# Patient Record
Sex: MALE | Race: WHITE | NOT HISPANIC OR LATINO | Employment: FULL TIME | URBAN - METROPOLITAN AREA
[De-identification: names, ages, dates, MRNs, and addresses within clinical notes are randomized per-mention and may not be internally consistent; named-entity substitution may affect disease eponyms.]

---

## 2017-02-10 ENCOUNTER — OFFICE VISIT (OUTPATIENT)
Dept: FAMILY MEDICINE | Facility: CLINIC | Age: 62
End: 2017-02-10
Payer: COMMERCIAL

## 2017-02-10 VITALS
DIASTOLIC BLOOD PRESSURE: 86 MMHG | SYSTOLIC BLOOD PRESSURE: 120 MMHG | HEART RATE: 88 BPM | WEIGHT: 243.19 LBS | BODY MASS INDEX: 38.17 KG/M2 | HEIGHT: 67 IN

## 2017-02-10 DIAGNOSIS — I10 ESSENTIAL HYPERTENSION: Primary | ICD-10-CM

## 2017-02-10 DIAGNOSIS — E66.01 SEVERE OBESITY (BMI 35.0-39.9) WITH COMORBIDITY: ICD-10-CM

## 2017-02-10 DIAGNOSIS — E78.5 DYSLIPIDEMIA: ICD-10-CM

## 2017-02-10 DIAGNOSIS — Z23 NEED FOR SHINGLES VACCINE: ICD-10-CM

## 2017-02-10 DIAGNOSIS — K64.9 HEMORRHOIDS, UNSPECIFIED HEMORRHOID TYPE: ICD-10-CM

## 2017-02-10 PROCEDURE — 3074F SYST BP LT 130 MM HG: CPT | Mod: S$GLB,,, | Performed by: FAMILY MEDICINE

## 2017-02-10 PROCEDURE — 3079F DIAST BP 80-89 MM HG: CPT | Mod: S$GLB,,, | Performed by: FAMILY MEDICINE

## 2017-02-10 PROCEDURE — 99999 PR PBB SHADOW E&M-EST. PATIENT-LVL III: CPT | Mod: PBBFAC,,, | Performed by: FAMILY MEDICINE

## 2017-02-10 PROCEDURE — 99214 OFFICE O/P EST MOD 30 MIN: CPT | Mod: S$GLB,,, | Performed by: FAMILY MEDICINE

## 2017-02-10 RX ORDER — HYDROCORTISONE ACETATE, PRAMOXINE HCL 2.5; 1 G/100G; G/100G
CREAM TOPICAL 3 TIMES DAILY
Qty: 57 G | Refills: 0 | Status: SHIPPED | OUTPATIENT
Start: 2017-02-10 | End: 2019-12-26

## 2017-02-10 NOTE — MR AVS SNAPSHOT
[facility]   [place]  [place] [address]  Phone: [phone]  Fax: [phone]                  Darrian Velez   2/10/2017 2:40 PM   Office Visit    Description:  Male : 1955   Provider:  Balwinder Alvarado MD   Department:  [facility]           Reason for Visit     Follow-up     Hypertension           Diagnoses this Visit        Comments    Essential hypertension    -  Primary     Dyslipidemia         Severe obesity (BMI 35.0-39.9) with comorbidity         Need for shingles vaccine         Hemorrhoids, unspecified hemorrhoid type                To Do List           Future Appointments        Provider Department Dept Phone    2017 7:30 AM LAB, [facility] [phone]      Goals (5 Years of Data)     None       These Medications        Disp Refills Start End    zoster vaccine live, PF, (ZOSTAVAX, PF,) 19,400 unit/0.65 mL injection 1 vial 0 2/10/2017 2/10/2017    Inject 19,400 Units into the skin once. - Subcutaneous    Pharmacy: [facility] [address] Ph #: [phone]       pramoxine-hydrocortisone cream 57 g 0 2/10/2017     Apply topically 3 (three) times daily. - Topical (Top)    Pharmacy: [facility] [facility] [address] Ph #: [phone]         [facility]     [facility] -  Assistance  Registered nurses in the [facility] provide clinical advisement, health education, appointment booking, and other advisory services.  Call for this free service at [phone].             Medications           Message regarding Medications     Verify the changes and/or additions to your medication regime listed below are the same as discussed with your clinician today.  If any of these changes or additions are incorrect, please notify your healthcare provider.        START taking these NEW medications        Refills    zoster  "vaccine live, PF, (ZOSTAVAX, PF,) 19,400 unit/0.65 mL injection 0    Sig: Inject 19,400 Units into the skin once.    Class: Print    Route: Subcutaneous    pramoxine-hydrocortisone cream 0    Sig: Apply topically 3 (three) times daily.    Class: Normal    Route: Topical (Top)           Verify that the below list of medications is an accurate representation of the medications you are currently taking.  If none reported, the list may be blank. If incorrect, please contact your healthcare provider. Carry this list with you in case of emergency.           Current Medications     ASPIRIN (ASPIR-81 ORAL)     atorvastatin (LIPITOR) 10 MG tablet Take 1 tablet (10 mg total) by mouth once daily.    fenofibrate micronized (LOFIBRA) 134 MG Cap TAKE ONE CAPSULE BY MOUTH ONCE DAILY IN THE EVENING    losartan-hydrochlorothiazide 50-12.5 mg (HYZAAR) 50-12.5 mg per tablet Take 1 tablet by mouth once daily.    multivitamin (MULTIVITAMIN) per tablet Take 1 tablet by mouth once daily.      OMEGA-3 FATTY ACIDS/FISH OIL (OMEGA 3 FISH OIL ORAL) Take by mouth. 1 Capsule By mouth Twice a day     loratadine (CLARITIN) 10 mg tablet Take 1 tablet (10 mg total) by mouth once daily.    pramoxine-hydrocortisone cream Apply topically 3 (three) times daily.    zoster vaccine live, PF, (ZOSTAVAX, PF,) 19,400 unit/0.65 mL injection Inject 19,400 Units into the skin once.           Clinical Reference Information           Your Vitals Were     BP Pulse Height Weight BMI    120/86 88 5' 7" (1.702 m) 110.3 kg (243 lb 2.7 oz) 38.09 kg/m2      Blood Pressure          Most Recent Value    BP  120/86      Allergies as of 2/10/2017     No Known Allergies      Immunizations Administered on Date of Encounter - 2/10/2017     None      Orders Placed During Today's Visit     Future Labs/Procedures Expected by Expires    CBC auto differential  2/10/2017 2/10/2018    Comprehensive metabolic panel  2/10/2017 5/11/2017    Lipid panel  2/10/2017 5/11/2017    TSH  " 2/10/2017 5/11/2017      Language Assistance Services     ATTENTION: Language assistance services are available, free of charge. Please call [phone].      ATENCIÓN: Si habfrank ward, tiene a atkinson disposición servicios gratuitos de asistencia lingüística. Llame al [phone].     CHÚ Ý: N?u b?n nói Ti?ng Vi?t, có các d?ch v? h? tr? ngôn ng? mi?n phí dành cho b?n. G?i s? [phone].         [facility] complies with applicable Federal civil rights laws and does not discriminate on the basis of race, color, national origin, age, disability, or sex.

## 2017-02-10 NOTE — PROGRESS NOTES
Subjective:       Patient ID: Darrian Velez is a 62 y.o. male.    Chief Complaint: Follow-up and Hypertension    HPI Comments: 62 years old male who came to the clinic for blood pressure check.  Blood pressure today is stable.  No chest pain palpitations orthopnea or PND.  Patient with a BMI of 38 currently trying to lose weight.  Patient with good compliance with medical regimen.    Hypertension   Pertinent negatives include no chest pain or palpitations.     Review of Systems   Constitutional: Negative.    HENT: Negative.    Eyes: Negative.    Respiratory: Negative.    Cardiovascular: Negative.  Negative for chest pain, palpitations and leg swelling.   Gastrointestinal: Negative.    Genitourinary: Negative.    Musculoskeletal: Negative.    Skin: Negative.    Neurological: Negative.    Psychiatric/Behavioral: Negative.        Objective:      Physical Exam   Constitutional: He is oriented to person, place, and time. He appears well-developed and well-nourished. No distress.   HENT:   Head: Normocephalic and atraumatic.   Right Ear: External ear normal.   Left Ear: External ear normal.   Nose: Nose normal.   Mouth/Throat: Oropharynx is clear and moist. No oropharyngeal exudate.   Eyes: Conjunctivae and EOM are normal. Pupils are equal, round, and reactive to light. Right eye exhibits no discharge. Left eye exhibits no discharge. No scleral icterus.   Neck: Normal range of motion. Neck supple. No JVD present. No tracheal deviation present. No thyromegaly present.   Cardiovascular: Normal rate, regular rhythm, normal heart sounds and intact distal pulses.  Exam reveals no gallop and no friction rub.    No murmur heard.  Pulmonary/Chest: Effort normal and breath sounds normal. No stridor. No respiratory distress. He has no wheezes. He has no rales. He exhibits no tenderness.   Abdominal: Soft. Bowel sounds are normal. He exhibits no distension and no mass. There is no tenderness. There is no rebound and no  guarding.   Musculoskeletal: Normal range of motion. He exhibits no edema or tenderness.   Lymphadenopathy:     He has no cervical adenopathy.   Neurological: He is alert and oriented to person, place, and time. He has normal reflexes. He displays normal reflexes. No cranial nerve deficit. He exhibits normal muscle tone. Coordination normal.   Skin: Skin is warm and dry. No rash noted. He is not diaphoretic. No erythema. No pallor.   Psychiatric: He has a normal mood and affect. His behavior is normal. Judgment and thought content normal.   Nursing note and vitals reviewed.      Assessment:       1. Essential hypertension    2. Dyslipidemia    3. Severe obesity (BMI 35.0-39.9) with comorbidity    4. Need for shingles vaccine    5. Hemorrhoids, unspecified hemorrhoid type        Plan:         Darrian Fuentes was seen today for follow-up and hypertension.    Diagnoses and all orders for this visit:    Essential hypertension  -     Comprehensive metabolic panel; Future  -     Lipid panel; Future  -     TSH; Future  -     CBC auto differential; Future    Dyslipidemia  -     Comprehensive metabolic panel; Future  -     Lipid panel; Future  -     TSH; Future    Severe obesity (BMI 35.0-39.9) with comorbidity    Need for shingles vaccine  -     zoster vaccine live, PF, (ZOSTAVAX, PF,) 19,400 unit/0.65 mL injection; Inject 19,400 Units into the skin once.    Hemorrhoids, unspecified hemorrhoid type  -     pramoxine-hydrocortisone cream; Apply topically 3 (three) times daily.

## 2017-02-13 ENCOUNTER — LAB VISIT (OUTPATIENT)
Dept: LAB | Facility: HOSPITAL | Age: 62
End: 2017-02-13
Attending: FAMILY MEDICINE
Payer: COMMERCIAL

## 2017-02-13 DIAGNOSIS — I10 ESSENTIAL HYPERTENSION: ICD-10-CM

## 2017-02-13 DIAGNOSIS — E78.5 DYSLIPIDEMIA: ICD-10-CM

## 2017-02-13 LAB
ALBUMIN SERPL BCP-MCNC: 4 G/DL
ALP SERPL-CCNC: 82 U/L
ALT SERPL W/O P-5'-P-CCNC: 39 U/L
ANION GAP SERPL CALC-SCNC: 10 MMOL/L
AST SERPL-CCNC: 26 U/L
BASOPHILS # BLD AUTO: 0.03 K/UL
BASOPHILS NFR BLD: 0.5 %
BILIRUB SERPL-MCNC: 0.3 MG/DL
BUN SERPL-MCNC: 16 MG/DL
CALCIUM SERPL-MCNC: 9.5 MG/DL
CHLORIDE SERPL-SCNC: 108 MMOL/L
CHOLEST/HDLC SERPL: 3.8 {RATIO}
CO2 SERPL-SCNC: 23 MMOL/L
CREAT SERPL-MCNC: 1 MG/DL
DIFFERENTIAL METHOD: ABNORMAL
EOSINOPHIL # BLD AUTO: 0.1 K/UL
EOSINOPHIL NFR BLD: 2.5 %
ERYTHROCYTE [DISTWIDTH] IN BLOOD BY AUTOMATED COUNT: 13.5 %
EST. GFR  (AFRICAN AMERICAN): >60 ML/MIN/1.73 M^2
EST. GFR  (NON AFRICAN AMERICAN): >60 ML/MIN/1.73 M^2
GLUCOSE SERPL-MCNC: 119 MG/DL
HCT VFR BLD AUTO: 45.9 %
HDL/CHOLESTEROL RATIO: 26 %
HDLC SERPL-MCNC: 123 MG/DL
HDLC SERPL-MCNC: 32 MG/DL
HGB BLD-MCNC: 15.5 G/DL
LDLC SERPL CALC-MCNC: 58.6 MG/DL
LYMPHOCYTES # BLD AUTO: 1.7 K/UL
LYMPHOCYTES NFR BLD: 30.3 %
MCH RBC QN AUTO: 30.8 PG
MCHC RBC AUTO-ENTMCNC: 33.8 %
MCV RBC AUTO: 91 FL
MONOCYTES # BLD AUTO: 0.9 K/UL
MONOCYTES NFR BLD: 16.1 %
NEUTROPHILS # BLD AUTO: 2.9 K/UL
NEUTROPHILS NFR BLD: 50.4 %
NONHDLC SERPL-MCNC: 91 MG/DL
PLATELET # BLD AUTO: 215 K/UL
PMV BLD AUTO: 12.3 FL
POTASSIUM SERPL-SCNC: 4 MMOL/L
PROT SERPL-MCNC: 7.4 G/DL
RBC # BLD AUTO: 5.03 M/UL
SODIUM SERPL-SCNC: 141 MMOL/L
TRIGL SERPL-MCNC: 162 MG/DL
TSH SERPL DL<=0.005 MIU/L-ACNC: 2.23 UIU/ML
WBC # BLD AUTO: 5.71 K/UL

## 2017-02-13 PROCEDURE — 80061 LIPID PANEL: CPT

## 2017-02-13 PROCEDURE — 85025 COMPLETE CBC W/AUTO DIFF WBC: CPT

## 2017-02-13 PROCEDURE — 36415 COLL VENOUS BLD VENIPUNCTURE: CPT | Mod: PO

## 2017-02-13 PROCEDURE — 80053 COMPREHEN METABOLIC PANEL: CPT

## 2017-02-13 PROCEDURE — 84443 ASSAY THYROID STIM HORMONE: CPT

## 2017-02-20 DIAGNOSIS — I10 ESSENTIAL HYPERTENSION: ICD-10-CM

## 2017-02-20 RX ORDER — LOSARTAN POTASSIUM AND HYDROCHLOROTHIAZIDE 12.5; 5 MG/1; MG/1
TABLET ORAL
Qty: 90 TABLET | Refills: 0 | Status: SHIPPED | OUTPATIENT
Start: 2017-02-20 | End: 2017-05-12 | Stop reason: SDUPTHER

## 2017-03-15 DIAGNOSIS — E78.5 DYSLIPIDEMIA: ICD-10-CM

## 2017-03-15 RX ORDER — FENOFIBRATE 134 MG/1
CAPSULE ORAL
Qty: 30 CAPSULE | Refills: 0 | Status: SHIPPED | OUTPATIENT
Start: 2017-03-15 | End: 2017-04-13 | Stop reason: SDUPTHER

## 2017-04-13 DIAGNOSIS — E78.5 DYSLIPIDEMIA: ICD-10-CM

## 2017-04-13 RX ORDER — FENOFIBRATE 134 MG/1
CAPSULE ORAL
Qty: 30 CAPSULE | Refills: 0 | Status: SHIPPED | OUTPATIENT
Start: 2017-04-13 | End: 2017-05-12 | Stop reason: SDUPTHER

## 2017-05-12 DIAGNOSIS — I10 ESSENTIAL HYPERTENSION: ICD-10-CM

## 2017-05-12 DIAGNOSIS — E78.5 DYSLIPIDEMIA: ICD-10-CM

## 2017-05-12 RX ORDER — LOSARTAN POTASSIUM AND HYDROCHLOROTHIAZIDE 12.5; 5 MG/1; MG/1
TABLET ORAL
Qty: 90 TABLET | Refills: 0 | Status: SHIPPED | OUTPATIENT
Start: 2017-05-12 | End: 2017-06-09 | Stop reason: SDUPTHER

## 2017-05-12 RX ORDER — FENOFIBRATE 134 MG/1
CAPSULE ORAL
Qty: 30 CAPSULE | Refills: 0 | Status: SHIPPED | OUTPATIENT
Start: 2017-05-12 | End: 2017-06-09 | Stop reason: SDUPTHER

## 2017-06-09 ENCOUNTER — OFFICE VISIT (OUTPATIENT)
Dept: FAMILY MEDICINE | Facility: CLINIC | Age: 62
End: 2017-06-09
Payer: COMMERCIAL

## 2017-06-09 VITALS
HEART RATE: 78 BPM | SYSTOLIC BLOOD PRESSURE: 124 MMHG | BODY MASS INDEX: 38.92 KG/M2 | WEIGHT: 248 LBS | OXYGEN SATURATION: 98 % | DIASTOLIC BLOOD PRESSURE: 76 MMHG | HEIGHT: 67 IN

## 2017-06-09 DIAGNOSIS — E78.5 DYSLIPIDEMIA: ICD-10-CM
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
## 2017-06-09 DIAGNOSIS — S60.562A INSECT BITE HAND, LEFT, INITIAL ENCOUNTER: Primary | ICD-10-CM

## 2017-06-09 DIAGNOSIS — I10 ESSENTIAL HYPERTENSION: ICD-10-CM

## 2017-06-09 PROCEDURE — 99999 PR PBB SHADOW E&M-EST. PATIENT-LVL III: CPT | Mod: PBBFAC,,, | Performed by: FAMILY MEDICINE

## 2017-06-09 PROCEDURE — 99214 OFFICE O/P EST MOD 30 MIN: CPT | Mod: S$GLB,,, | Performed by: FAMILY MEDICINE

## 2017-06-09 RX ORDER — DOXYCYCLINE 100 MG/1
100 CAPSULE ORAL 2 TIMES DAILY
Qty: 20 CAPSULE | Refills: 0 | Status: SHIPPED | OUTPATIENT
Start: 2017-06-09 | End: 2018-01-22

## 2017-06-09 RX ORDER — ATORVASTATIN CALCIUM 10 MG/1
10 TABLET, FILM COATED ORAL DAILY
Qty: 30 TABLET | Refills: 11 | Status: SHIPPED | OUTPATIENT
Start: 2017-06-09 | End: 2018-05-31 | Stop reason: SDUPTHER

## 2017-06-09 RX ORDER — MUPIROCIN 20 MG/G
OINTMENT TOPICAL 3 TIMES DAILY
Qty: 30 G | Refills: 0 | Status: SHIPPED | OUTPATIENT
Start: 2017-06-09 | End: 2018-01-22

## 2017-06-09 RX ORDER — FENOFIBRATE 134 MG/1
CAPSULE ORAL
Qty: 30 CAPSULE | Refills: 11 | Status: SHIPPED | OUTPATIENT
Start: 2017-06-09 | End: 2018-08-22 | Stop reason: SDUPTHER

## 2017-06-09 RX ORDER — LOSARTAN POTASSIUM AND HYDROCHLOROTHIAZIDE 12.5; 5 MG/1; MG/1
1 TABLET ORAL DAILY
Qty: 30 TABLET | Refills: 11 | Status: SHIPPED | OUTPATIENT
Start: 2017-06-09 | End: 2018-05-31 | Stop reason: SDUPTHER

## 2017-06-09 NOTE — PROGRESS NOTES
Subjective:       Patient ID: Darrian Velez is a 62 y.o. male.    Chief Complaint: Insect Bite    62 years old male who came to the clinic with left hand fifth finger redness associated with insect bite.  Patient with mild limitation to movement.  No fevers or chills.  Patient was using antibiotic ointment with no significant improvement.  Blood pressure today stable.  No chest pain palpitations orthopnea or PND.  Patient with a BMI of 38 currently trying to lose weight.  Patient with good compliance with cholesterol medicine.      Insect Bite   Pertinent negatives include no chest pain.     Review of Systems   Constitutional: Negative.    HENT: Negative.    Eyes: Negative.    Respiratory: Negative.    Cardiovascular: Negative.  Negative for chest pain, palpitations and leg swelling.   Gastrointestinal: Negative.    Genitourinary: Negative.    Musculoskeletal: Negative.    Skin: Negative.    Neurological: Negative.    Psychiatric/Behavioral: Negative.        Objective:      Physical Exam   Constitutional: He is oriented to person, place, and time. He appears well-developed and well-nourished. No distress.   HENT:   Head: Normocephalic and atraumatic.   Right Ear: External ear normal.   Left Ear: External ear normal.   Nose: Nose normal.   Mouth/Throat: Oropharynx is clear and moist. No oropharyngeal exudate.   Eyes: Conjunctivae and EOM are normal. Pupils are equal, round, and reactive to light. Right eye exhibits no discharge. Left eye exhibits no discharge. No scleral icterus.   Neck: Normal range of motion. Neck supple. No JVD present. No tracheal deviation present. No thyromegaly present.   Cardiovascular: Normal rate, regular rhythm, normal heart sounds and intact distal pulses.  Exam reveals no gallop and no friction rub.    No murmur heard.  Pulmonary/Chest: Effort normal and breath sounds normal. No stridor. No respiratory distress. He has no wheezes. He has no rales. He exhibits no tenderness.    Abdominal: Soft. Bowel sounds are normal. He exhibits no distension and no mass. There is no tenderness. There is no rebound and no guarding.   Musculoskeletal: Normal range of motion. He exhibits no edema or tenderness.   Lymphadenopathy:     He has no cervical adenopathy.   Neurological: He is alert and oriented to person, place, and time. He has normal reflexes. He displays normal reflexes. No cranial nerve deficit. He exhibits normal muscle tone. Coordination normal.   Skin: Skin is warm and dry. No rash noted. He is not diaphoretic. There is erythema. No pallor.        Psychiatric: He has a normal mood and affect. His behavior is normal. Judgment and thought content normal.   Nursing note and vitals reviewed.      Assessment:       1. Insect bite hand, left, initial encounter    2. Essential hypertension    3. Dyslipidemia        Plan:         Darrian Fuentes was seen today for insect bite.    Diagnoses and all orders for this visit:    Insect bite hand, left, initial encounter    Essential hypertension  -     losartan-hydrochlorothiazide 50-12.5 mg (HYZAAR) 50-12.5 mg per tablet; Take 1 tablet by mouth once daily.    Dyslipidemia  -     atorvastatin (LIPITOR) 10 MG tablet; Take 1 tablet (10 mg total) by mouth once daily.  -     fenofibrate micronized (LOFIBRA) 134 MG Cap; TAKE ONE CAPSULE BY MOUTH ONCE DAILY IN THE EVENING    Other orders  -     doxycycline (MONODOX) 100 MG capsule; Take 1 capsule (100 mg total) by mouth 2 (two) times daily.  -     mupirocin (BACTROBAN) 2 % ointment; Apply topically 3 (three) times daily.    Continue monitoring blood pressure at home, low sodium diet.   Diet and physical activity to promote weight loss.

## 2017-06-09 NOTE — PATIENT INSTRUCTIONS
"  Insect, Spider, and Scorpion Bites and Stings  Most insect bites are harmless and cause only minor swelling or itching. But if youre allergic to insects such as wasps or bees, a sting can cause a life-threatening allergic reaction. The venom (poison) from scorpions and certain spiders can also be deadly, although this is rare. Knowing when to seek emergency care could save your life.     The black  (top) and brown recluse (bottom) are two poisonous spiders found in the [place].   When to go to the emergency room (ER)  Call 911 right away for any:  · Scorpion sting  · Bite from a black, red, or brown  spider or brown recluse spider  · Signs of an allergic reaction such as:  ¨ Hives  ¨ Swelling of your eyes, lips, or the inside of your throat  ¨ Trouble breathing  ¨ Dizziness or confusion  What to expect in the ER  · If youre having trouble breathing, youll be given oxygen through a mask. In case of severe breathing difficulty, you may have a tube inserted in your throat and be placed on a ventilator (breathing machine).  · If you are having a severe allergic reaction from a sting (called anaphylaxis), you may be given a shot of epinephrine. If it is known that you are allergic to bee or wasp stings, your doctor may give you a prescription for an "epi-pen" that you can keep with you at all times in case of a sting.  · You may receive antivenin (a substance that reverses the effects of poison) for some spider bites and scorpion stings. Because antivenin can sometimes cause other problems, your doctor will weigh the risks and benefits of this treatment.  · Steroids such as prednisone are often used to treat allergic reactions. In many cases, your doctor will prescribe an antihistamine to help relieve itching.  Easing symptoms of an insect bite or sting  · Try to remove a stinger you can see. Use your fingernail, a knife edge, or credit card to scrape against the skin. Do not squeeze or " pull.  · Apply ice or a cold compress to reduce pain and swelling (keep a thin cloth between the cold source and the skin).   Date Last Reviewed: 11/20/2014  © 1514-6721 [employer]. [address] [place] [address], [place] [address]. All rights reserved. This information is not intended as a substitute for professional medical care. Always follow your healthcare professional's instructions.

## 2018-01-22 ENCOUNTER — TELEPHONE (OUTPATIENT)
Dept: FAMILY MEDICINE | Facility: CLINIC | Age: 63
End: 2018-01-22

## 2018-01-22 ENCOUNTER — HOSPITAL ENCOUNTER (OUTPATIENT)
Dept: RADIOLOGY | Facility: HOSPITAL | Age: 63
Discharge: HOME | End: 2018-01-22
Attending: FAMILY MEDICINE
Payer: COMMERCIAL

## 2018-01-22 ENCOUNTER — OFFICE VISIT (OUTPATIENT)
Dept: FAMILY MEDICINE | Facility: CLINIC | Age: 63
End: 2018-01-22
Payer: COMMERCIAL

## 2018-01-22 VITALS
BODY MASS INDEX: 38.06 KG/M2 | WEIGHT: 242.5 LBS | OXYGEN SATURATION: 96 % | HEIGHT: 67 IN | HEART RATE: 96 BPM | DIASTOLIC BLOOD PRESSURE: 68 MMHG | TEMPERATURE: 99 F | SYSTOLIC BLOOD PRESSURE: 140 MMHG

## 2018-01-22 DIAGNOSIS — M25.572 ACUTE LEFT ANKLE PAIN: ICD-10-CM

## 2018-01-22 DIAGNOSIS — M25.572 ACUTE LEFT ANKLE PAIN: Primary | ICD-10-CM

## 2018-01-22 PROCEDURE — 73630 X-RAY EXAM OF FOOT: CPT | Mod: TC,PO,LT

## 2018-01-22 PROCEDURE — 99999 PR PBB SHADOW E&M-EST. PATIENT-LVL IV: CPT | Mod: PBBFAC,,, | Performed by: FAMILY MEDICINE

## 2018-01-22 PROCEDURE — 73610 X-RAY EXAM OF ANKLE: CPT | Mod: 26,LT,, | Performed by: RADIOLOGY

## 2018-01-22 PROCEDURE — 73630 X-RAY EXAM OF FOOT: CPT | Mod: 26,LT,, | Performed by: RADIOLOGY

## 2018-01-22 PROCEDURE — 73610 X-RAY EXAM OF ANKLE: CPT | Mod: TC,PO,LT

## 2018-01-22 PROCEDURE — 99214 OFFICE O/P EST MOD 30 MIN: CPT | Mod: S$GLB,,, | Performed by: FAMILY MEDICINE

## 2018-01-22 RX ORDER — IBUPROFEN 600 MG/1
600 TABLET ORAL
Qty: 42 TABLET | Refills: 0 | Status: SHIPPED | OUTPATIENT
Start: 2018-01-22 | End: 2018-02-05

## 2018-01-22 NOTE — TELEPHONE ENCOUNTER
Called patient and reviewed Xr results. No fracture. PT referral given. Pain control and R.I.C.E advised.

## 2018-01-22 NOTE — PROGRESS NOTES
Subjective:       Patient ID: Darrian Velez is a 63 y.o. male.    Chief Complaint: Ankle Pain (Left ankle swollen)    63 year old male presents today with left ankle pain. This started when he fell on the ice on Wednesday. This pain is slowly worsening. The pain is worse with walking but he also feels it when he is lying down at night. He has been taking aleve BID. He has never had an injury to this ankle before. He denies any LOC or hitting his head.       Ankle Pain    The incident occurred 5 to 7 days ago. The incident occurred at home. The injury mechanism was a fall. The pain is present in the left ankle. The quality of the pain is described as aching and burning. The pain is at a severity of 8/10. The pain is mild. The pain has been worsening since onset. Associated symptoms include an inability to bear weight, a loss of motion, numbness and tingling. Pertinent negatives include no loss of sensation or muscle weakness. It is unknown if a foreign body is present. The symptoms are aggravated by weight bearing and movement. He has tried NSAIDs for the symptoms. The treatment provided no relief.     Review of Systems   Respiratory: Negative for cough and choking.    Cardiovascular: Negative for leg swelling.   Gastrointestinal: Negative for constipation, diarrhea, nausea and vomiting.   Musculoskeletal: Positive for gait problem and joint swelling.   Skin: Negative for rash.   Neurological: Positive for tingling and numbness.       Objective:     Vitals:    01/22/18 1404   BP: (!) 140/68   Pulse: 96   Temp: 98.5 °F (36.9 °C)        Physical Exam   Constitutional: He is oriented to person, place, and time. He appears well-developed and well-nourished.   HENT:   Head: Normocephalic and atraumatic.   Eyes: Conjunctivae are normal.   Cardiovascular: Normal rate and regular rhythm.    Pulmonary/Chest: Effort normal and breath sounds normal.   Abdominal: Soft.   Musculoskeletal: He exhibits edema and tenderness.         Right ankle: He exhibits decreased range of motion, swelling and ecchymosis. He exhibits normal pulse. Tenderness. Medial malleolus and head of 5th metatarsal tenderness found. No lateral malleolus tenderness found. Achilles tendon exhibits no pain, no defect and normal Randall's test results.        Feet:    Left ankle with visible swelling at lateral malleolus, and TTP of medial malleolus and fith metatarsal. Pulses intact. Capillary refill is normal. There is tenderness with ROM, worst with dorsiflexion against resistance. No erythema, no discharge. Small amount of brusing noted at left medial malleolus  Right ankle normal.    Neurological: He is alert and oriented to person, place, and time. No cranial nerve deficit or sensory deficit. He exhibits normal muscle tone.   Finger to nose intact  Heel to shin intact  Strength and sensation grossly intract BL UE/LE  CN 2-12 grossly intact  PERRLA  Rapid alternating movement intact   Skin: Skin is warm. Capillary refill takes less than 2 seconds.   Psychiatric: He has a normal mood and affect. His behavior is normal. Judgment and thought content normal.   Nursing note and vitals reviewed.      Assessment:       1. Acute left ankle pain        Plan:         Acute left ankle pain  Patient with left ankle pain s/p fail  Fracture vs sprain  XR to rule out fracture  Will give further recommendations once imaging is back  Can consider PT if no fracture  R.I.C.E  Handout given  Ibuprofen TID WM  -     X-Ray Foot Complete 3 view Left; Future; Expected date: 01/22/2018  -     X-Ray Ankle Complete Left; Future; Expected date: 01/22/2018  -     ibuprofen (ADVIL,MOTRIN) 600 MG tablet; Take 1 tablet (600 mg total) by mouth 3 (three) times daily with meals.  Dispense: 42 tablet; Refill: 0            Warning signs discussed, patient to call with any further issues or worsening of symptoms.

## 2018-01-22 NOTE — PATIENT INSTRUCTIONS

## 2018-02-07 ENCOUNTER — CLINICAL SUPPORT (OUTPATIENT)
Dept: REHABILITATION | Facility: HOSPITAL | Age: 63
End: 2018-02-07
Payer: COMMERCIAL

## 2018-02-07 DIAGNOSIS — R29.898 DECREASED STRENGTH OF LOWER EXTREMITY: ICD-10-CM

## 2018-02-07 DIAGNOSIS — M25.572 LEFT ANKLE PAIN, UNSPECIFIED CHRONICITY: ICD-10-CM

## 2018-02-07 DIAGNOSIS — R26.9 IMPAIRED GAIT: ICD-10-CM

## 2018-02-07 PROCEDURE — 97161 PT EVAL LOW COMPLEX 20 MIN: CPT | Mod: PN

## 2018-02-07 PROCEDURE — 97110 THERAPEUTIC EXERCISES: CPT | Mod: PN

## 2018-02-07 NOTE — PLAN OF CARE
TIME RECORD    Date: 2/7/2018    Start Time:  7:09 am   Stop Time:  7:46 am     PROCEDURES:    TIMED  Procedure Min.   TE 10'                      UNTIMED  Procedure Min.   IE 27'          Total Timed Minutes:  10'   Total Timed Units:  1  Total Untimed Units:  1  Charges Billed/# of units:  2 ( 1 IE, 1 TE)     OUTPATIENT PHYSICAL THERAPY   PATIENT EVALUATION  Onset Date: approximately 3 weeks ago  Primary Diagnosis: (L) ankle pain, decreased (L) ankle strength/stability, antalgic gait  Treatment Diagnosis:   1. Left ankle pain, unspecified chronicity     2. Impaired gait     3. Decreased strength of lower extremity       Past Medical History:   Diagnosis Date    ALLERGIC RHINITIS     Hx of colonic polyps 4/05/2012    Tubular adenoma    Hyperlipidemia     Hypertension     MRSA infection     left medial thigh abscess & cellulitis    Personal history of kidney stones      Precautions: HTN, standard  Prior Therapy: no  Medications: Darrian Velez has a current medication list which includes the following prescription(s): aspirin, atorvastatin, fenofibrate micronized, loratadine, losartan-hydrochlorothiazide 50-12.5 mg, multivitamin, omega-3 fatty acids/fish oil, and pramoxine-hydrocortisone.  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   Place of Residence (Steps/Adaptations): Pt stated that he has one step to enter his [facility].   Functional Deficits Leading to Referral/Nature of Injury: difficulty/increased pain in (L) ankle when ambulating and standing  Patient Therapy Goals: decrease pain    Subjective     Darrian Velez states that he fell three weeks ago when he slipped on ice. Pt stated that he twisted his (L) ankle. Pt reported that he underwent xray on (L) ankle and (L) foot and was told did not have any fractures. Pt stated  that his (L) ankle is feeling better, but does still randomly give out when walking. Pt stated that he wears a (L) ankle brace. Pt denies any (L) ankle problems prior to fall. Pt stated that he has increased pain/difficulty walking and standing.     Pain:  Location: (L) ankle   Description: Aching and Sharp  Activities Which Increase Pain: Walking and standing  Activities Which Decrease Pain: ice, elevation and Ibuprofen  Pain Scale: 2/10 at best 2/10 now  10/10 at worst    Objective       Palpation: pt c/o tenderness to palpation along (L) anterior ankle       Range of Motion/Strength:   Hip Right  Left  Pain/Dysfunction with Movement    AROM MMT AROM MMT    Flexion WFL 5/5 WFL 5/5    Extension NT NT NT NT Able to perform good bridge against gravity    Abduction WFL 5/5 WFL 5/5      Knee Right  Left  Pain/Dysfunction with Movement    AROM MMT AROM MMT    Flexion WFL 5/5 WFL 5/5    Extension WFL 5/5 WFL 5/5      Ankle Right   Left  Pain/Dysfunction with Movement    AROM MMT AROM PROM MMT ! = pain   Plantarflexion 47  46! NT 5/5 C/o pain in (L) achilles tendon region   Dorsiflexion -3  3! NT 4+/5! C/o pain in (L) anterior ankle   Inversion 35  35! NT 4+/5! C/o pain in (L)  anterior ankle   Eversion 10  15! NT 4+/5! C/o pain in (L) anterior ankle       Flexibility: decreased (B) gastroc flexibility  Gait: Without AD  Analysis: pt demo antalgic gait with (L) limp, decreased stance time on (L) LE, decreased migdalia  Bed Mobility:Independent  Transfers: Independent  Special Tests: SLS (L) = 3 seconds, increased ankle strategy noted, c/o pain in (L) anterior ankle; (R) = 14 seconds, increased ankle strategy noted    Functional Limitation Reports: G codes  Tool: FOTO ankle SURVEY  Limitation: 42%      TREATMENT     Time In: 7:36 am   Time Out: 7:46 am    PT Evaluation Completed? Yes  Discussed Plan of Care with patient: Yes    Darrian Fuentes received 10 minutes of therapeutic exercise & instruction including:    Date 2/7/18  "  Visit 1/12   FOTO 1/5   bike    MT     gastroc stretch 3x30" seated   Ankle tband   4 directions YTB DF/PF/IV 2x10    EV held   BAPS (seated) -   Towel crunch -   Towel inv/ev -   Ankle AROM -   //bars -   Heel raise -   BAPS -   SLS -   Seen by CK         Darrianindy Fuentes received 0 minutes of manual therapy including:      Written Home Exercises Provided: yes - exercises noted above (See media section)  Darrian Fuentes demo good understanding of the education provided. Patient demo good return demo of skill of exercises. Pt was instructed to stop performing particular therex if he experiences increased pain when performing particular therex. Pt verbalized understanding.       Assessment       Initial Assessment (Pertinent finding, problem list and factors affecting outcome): Mr. Velez is a 63 year old male with c/o (L) ankle pain since fall approximately 3 weeks ago. Pt presents with painful (L) ankle AROM, decreased (L) ankle strength, impaired gait/balance, and decreased functional mobility. Pt will benefit from skilled PT to address the limitations listed above in order to return pt to his highest level of function with decreased pain and limitation.     History  Co-morbidities and personal factors that may impact the plan of care Examination  Body Structures and Functions, activity limitations and participation restrictions that may impact the plan of care    Clinical Presentation   Co-morbidities:   HTN        Personal Factors:   no deficits Body Regions:   lower extremities    Body Systems:    ROM  strength  gait            Participation Restrictions:   n/a     Activity limitations:   Learning and applying knowledge  no deficits    General Tasks and Commands  no deficits    Communication  no deficits    Mobility  walking    Self care  no deficits    Domestic Life  no deficits    Interactions/Relationships  no deficits    Life Areas  no deficits    Community and Social Life  no deficits         stable and " uncomplicated                      low   moderate  moderate Decision Making/ Complexity Score:  low       Rehab Potiential: good  Barriers to Rehab: none  Short Term Goals = Long Term Goals (8 Weeks): 4/7/18  1. Pt will be independent with HEP  2. Pt will improve (L) ankle strength to 5/5 in order to improve functional mobility  3. Pt will perform (L) ankle AROM WNL in all directions with c/o 0/10 pain in order to improve functional mobility  4. Pt will improve FOTO ankle survey score to </= 25% limited   5. Pt will report ability to ambulate community distances with 0/10 pain in (L) ankle    Plan     Certification Period: 2/7/18 to 4/7/18  Recommended Treatment Plan: 2 times per week for 8 weeks: Electrical Stimulation IFC, Gait Training, Group Therapy, Locomotor Training, Manual Therapy, Moist Heat/ Ice, Neuromuscular Re-ed, Patient Education, Self Care, Therapeutic Activites and Therapeutic Exercise  Other Recommendations: modalities prn, ASTYM prn, kinesiotape prn, Functional Dry Needling prn       Therapist: Amy Cabrera, PT    I CERTIFY THE NEED FOR THESE SERVICES FURNISHED UNDER THIS PLAN OF TREATMENT AND WHILE UNDER MY CARE    Physician's comments: ________________________________________________________________________________________________________________________________________________      Physician's Name: ___________________________________

## 2018-02-15 ENCOUNTER — CLINICAL SUPPORT (OUTPATIENT)
Dept: REHABILITATION | Facility: HOSPITAL | Age: 63
End: 2018-02-15
Payer: COMMERCIAL

## 2018-02-15 DIAGNOSIS — R26.9 IMPAIRED GAIT: ICD-10-CM

## 2018-02-15 DIAGNOSIS — M25.572 LEFT ANKLE PAIN, UNSPECIFIED CHRONICITY: ICD-10-CM

## 2018-02-15 DIAGNOSIS — R29.898 DECREASED STRENGTH OF LOWER EXTREMITY: ICD-10-CM

## 2018-02-15 PROCEDURE — 97110 THERAPEUTIC EXERCISES: CPT | Mod: PN

## 2018-02-15 PROCEDURE — 97140 MANUAL THERAPY 1/> REGIONS: CPT | Mod: PN

## 2018-02-15 NOTE — PROGRESS NOTES
"TIME RECORD    Date: 2/15/2018      Start Time:  1706  Stop Time:  1748    PROCEDURES:    TIMED  Procedure Min.   MT 8   TE 34                 UNTIMED  Procedure Min.             Total Timed Minutes:  42  Total Timed Units:  3  Total Untimed Units:    Charges Billed/# of units:  4 (MT-1, TE-3)      Progress/Current Status    Subjective:     Patient ID: Darrian Velez is a 63 y.o. male.  Diagnosis:   1. Left ankle pain, unspecified chronicity     2. Impaired gait     3. Decreased strength of lower extremity       Pain: 0 /10 L ankle  Pt states his L ankle pain has improved since performing exercises at home    Objective:     Pt initiated treatment with TE x 34' f/b MT x 8' consisting of STM/MFR to L plantar fascia, fibularis tendons    Date 2/15/18 2/7/18   Visit 2/12 1/12   POC exp  4/7/18     FOTO 2/5 1/5   bike      MT       gastroc stretch 3x30" seated 3x30" seated   Soleus stretch 3x30" seated    Ankle tband   4 directions YTB DF/PF/IV 2x10     EV held YTB DF/PF/IV 2x10     EV held   BAPS (seated) 2x10 6-way -   Towel crunch 2x1' -   Towel inv/ev Inv 2x1' -   Dowel roll 2x1'    Ankle AROM  -   Heel/toe raise 2x10 ea         //bars  -   Heel raise  -   BAPS  -   SLS  -        Seen by KV CK         Assessment:     Pt tolerated treatment well with no c/o of pain or discomfort. Required verbal and tactile cuing for correc performance of BAPS.    Patient Education/Response:     Cont HEP.    Plans and Goals:     Cont POC. Progress as able.    Short Term Goals = Long Term Goals (8 Weeks): 4/7/18  1. Pt will be independent with HEP  2. Pt will improve (L) ankle strength to 5/5 in order to improve functional mobility  3. Pt will perform (L) ankle AROM WNL in all directions with c/o 0/10 pain in order to improve functional mobility  4. Pt will improve FOTO ankle survey score to </= 25% limited   5. Pt will report ability to ambulate community distances with 0/10 pain in (L) ankle      "

## 2018-02-20 ENCOUNTER — CLINICAL SUPPORT (OUTPATIENT)
Dept: REHABILITATION | Facility: HOSPITAL | Age: 63
End: 2018-02-20
Payer: COMMERCIAL

## 2018-02-20 DIAGNOSIS — R29.898 DECREASED STRENGTH OF LOWER EXTREMITY: ICD-10-CM

## 2018-02-20 DIAGNOSIS — M25.572 LEFT ANKLE PAIN, UNSPECIFIED CHRONICITY: ICD-10-CM

## 2018-02-20 DIAGNOSIS — R26.9 IMPAIRED GAIT: ICD-10-CM

## 2018-02-20 PROCEDURE — 97140 MANUAL THERAPY 1/> REGIONS: CPT | Mod: PN

## 2018-02-20 PROCEDURE — 97110 THERAPEUTIC EXERCISES: CPT | Mod: PN

## 2018-02-20 NOTE — PROGRESS NOTES
"TIME RECORD    Date: 2/20/2018      Start Time:  400  Stop Time:  450    PROCEDURES:    TIMED  Procedure Min.   Therex 35   Manual therapy 10                 UNTIMED  Procedure Min.   Bike 5         Total Timed Minutes:  45  Total Timed Units:  3  Total Untimed Units:  0  Charges Billed/# of units:  3   MTx1, TEx2      Progress/Current Status    Subjective:     Patient ID: Darrian Velez is a 63 y.o. male.  Diagnosis:   1. Left ankle pain, unspecified chronicity     2. Impaired gait     3. Decreased strength of lower extremity       "tight"    Objective:     Pt initiated treatment with supervised bike for active warm up and to promote joint nutrition.  MT x 10' consisting of STM/MFR to L plantar fascia, fibularis tendons, Manual calf stretching and PROM all directions..  Therex performed as per log with 1:1 by PTA x 35 minutes.    Date 2/20/18 2/15/18 2/7/18   Visit 3/12 2/12 1/12   POC exp  4/7/18      FOTO 3/5 2/5 1/5   bike 5'      MT  10'      gastroc stretch 30"x3 seated 3x30" seated 3x30" seated   Soleus stretch 30"x3 seated 3x30" seated    Ankle tband   4 directions YTB DF/PF/IV 2x12    EV AROM 2x12 YTB DF/PF/IV 2x10     EV held YTB DF/PF/IV 2x10     EV held   BAPS (seated) 2x12 L 6 way 2x10 6-way -   Towel crunch 1'x2 2x1' -   Towel inv/ev Inv 2x1' Ever 1' Inv 2x1' -   Dowel roll 2' 2x1'    Ankle AROM   -   Heel/toe raise  stand 2x10 ea          //bars   -   Heel/toe raise 2x10 ea DL  -   BAPS   -   SLS B 30"x2 ea   1 UE support  -         Seen by  1/6 KV CK       Assessment:     Patient able to increase activity with added reps and AROM eversion today without complaint of pain or difficulty.    Patient Education/Response:     Patient educated to continue HEP.  Verbalized understanding.    Plans and Goals:     Cont POC. Progress as able.    Short Term Goals = Long Term Goals (8 Weeks): 4/7/18  1. Pt will be independent with HEP  2. Pt will improve (L) ankle strength to 5/5 in order to improve " functional mobility  3. Pt will perform (L) ankle AROM WNL in all directions with c/o 0/10 pain in order to improve functional mobility  4. Pt will improve FOTO ankle survey score to </= 25% limited   5. Pt will report ability to ambulate community distances with 0/10 pain in (L) ankle

## 2018-02-22 ENCOUNTER — CLINICAL SUPPORT (OUTPATIENT)
Dept: REHABILITATION | Facility: HOSPITAL | Age: 63
End: 2018-02-22
Payer: COMMERCIAL

## 2018-02-22 DIAGNOSIS — R26.9 IMPAIRED GAIT: ICD-10-CM

## 2018-02-22 DIAGNOSIS — R29.898 DECREASED STRENGTH OF LOWER EXTREMITY: ICD-10-CM

## 2018-02-22 DIAGNOSIS — M25.572 LEFT ANKLE PAIN, UNSPECIFIED CHRONICITY: ICD-10-CM

## 2018-02-22 PROCEDURE — 97110 THERAPEUTIC EXERCISES: CPT | Mod: PN

## 2018-02-22 PROCEDURE — 97140 MANUAL THERAPY 1/> REGIONS: CPT | Mod: PN

## 2018-02-27 ENCOUNTER — DOCUMENTATION ONLY (OUTPATIENT)
Dept: REHABILITATION | Facility: HOSPITAL | Age: 63
End: 2018-02-27

## 2018-02-27 ENCOUNTER — CLINICAL SUPPORT (OUTPATIENT)
Dept: REHABILITATION | Facility: HOSPITAL | Age: 63
End: 2018-02-27
Payer: COMMERCIAL

## 2018-02-27 DIAGNOSIS — R26.9 IMPAIRED GAIT: ICD-10-CM

## 2018-02-27 DIAGNOSIS — M25.572 LEFT ANKLE PAIN, UNSPECIFIED CHRONICITY: ICD-10-CM

## 2018-02-27 DIAGNOSIS — R29.898 DECREASED STRENGTH OF LOWER EXTREMITY: ICD-10-CM

## 2018-02-27 PROCEDURE — 97140 MANUAL THERAPY 1/> REGIONS: CPT | Mod: PN

## 2018-02-27 PROCEDURE — 97110 THERAPEUTIC EXERCISES: CPT | Mod: PN

## 2018-02-27 NOTE — PROGRESS NOTES
"TIME RECORD    Date: 2/27/2018      Start Time:  5:00 pm   Stop Time:  5:44 pm     PROCEDURES:    TIMED  Procedure Min.   TE supervised 5' NC   TE 29'    MT 10'              UNTIMED  Procedure Min.             Total Timed Minutes:  39'   Total Timed Units:  3  Total Untimed Units:  0  Charges Billed/# of units:  3 ( 2 TE, 1 MT)       Progress/Current Status    Subjective:     Patient ID: Darrian Velez is a 63 y.o. male.  Diagnosis:   1. Left ankle pain, unspecified chronicity     2. Impaired gait     3. Decreased strength of lower extremity       Pain:   Pt stated that his (L) ankle is feeling a lot better. Pt stated that last Thursday he started weaning himself off of ankle brace and (L) ankle has been feeling good. Pt stated that his (L) ankle was just feeling a little achy prior to therapy session.   Objective:     Pt initiated treatment with supervised bike for active warm up and to promote joint nutrition supervised.  MT x 10' consisting of STM/MFR to L plantar fascia, fibularis tendons, (L) gastroc region.  Therex performed as per log 1:1 with PT x 29 minutes.      Date 2/27/18 2/22/18 2/20/18 2/15/18 2/7/18   Visit 5/12 4/12 3/12 2/12 1/12   POC exp  4/7/18        FOTO 5/5 4/5 3/5 2/5 1/5   bike 5'  5' 5'      MT  10'  10' 10'      gastroc stretch  Standing fitter 3x30"  Standing fitter 3x30"      gastroc stretch 3x30" seated  3x30" seated on EOM 30"x3 seated 3x30" seated 3x30" seated   Soleus stretch   30"x3 seated 3x30" seated    Ankle tband   4 directions RTB 2x10 ea YTB DF/PF/IN/EV  2x12 L AROM YTB DF/PF/IV 2x12    EV AROM 2x12 YTB DF/PF/IV 2x10     EV held YTB DF/PF/IV 2x10     EV held   BAPS (seated) - 2x12 L3  6 way 2x12 L 6 way 2x10 6-way -   Towel crunch - 2' x1 1'x2 2x1' -   Towel inv/ev - Inv 2x1' Ev 1' Inv 2x1' Ever 1' Inv 2x1' -   Dowel roll - 2' standing UE sup 2' 2x1'    Ankle AROM     -   Heel/toe raise - 2x10 B  stand 2x10 ea            //bars     -   Heel/toe raise SL 2x10 ea B 2x10 B " "2x10 ea DL  -   BAPS Standing 2x10 ea    -   SLS B 3x30" // B 3x30" B no UE sup B 30"x2 ea   1 UE support  -   rebounder 1x20 fwd B       Step ups next       Step downs next       Tandem walking on blue foam        CP home 10'      Seen by CK MB 2/6 DH 1/6 KV CK       Assessment:     Pt was able to tolerate progression of standing therex today. Pt demo impaired balance and ankle stability on (B) LE (L) > (R). Pt tolerated therapy session without any c/o increased pain. Continue to progress standing therex as tolerated.     Patient Education/Response:     Continue with HEP    Plans and Goals:     Cont POC. Progress as able.    Short Term Goals = Long Term Goals (8 Weeks): 4/7/18  1. Pt will be independent with HEP  2. Pt will improve (L) ankle strength to 5/5 in order to improve functional mobility  3. Pt will perform (L) ankle AROM WNL in all directions with c/o 0/10 pain in order to improve functional mobility  4. Pt will improve FOTO ankle survey score to </= 25% limited   5. Pt will report ability to ambulate community distances with 0/10 pain in (L) ankle    "

## 2018-02-27 NOTE — PROGRESS NOTES
Face to Face PTA Conference performed with Yaritza De Anda PTA, Aislinn Melo PTA, Balta Gamboa PTA regarding patient's current status, overall progress, and plan of care.    Amy Cabrera, PT     Face to face meeting completed with Amy Cabrera PT regarding current status and progress of   Darrian Velez .  Balta Gamboa PTA    Face to face meeting completed with Amy Cabrera, PT regarding current status and progress of   Darrian Velez .  Aislinn Melo, PTA

## 2018-03-01 ENCOUNTER — CLINICAL SUPPORT (OUTPATIENT)
Dept: REHABILITATION | Facility: HOSPITAL | Age: 63
End: 2018-03-01
Payer: COMMERCIAL

## 2018-03-01 DIAGNOSIS — R26.9 IMPAIRED GAIT: ICD-10-CM

## 2018-03-01 DIAGNOSIS — R29.898 DECREASED STRENGTH OF LOWER EXTREMITY: ICD-10-CM

## 2018-03-01 DIAGNOSIS — M25.572 LEFT ANKLE PAIN, UNSPECIFIED CHRONICITY: ICD-10-CM

## 2018-03-01 PROCEDURE — 97110 THERAPEUTIC EXERCISES: CPT | Mod: PN

## 2018-03-01 NOTE — PROGRESS NOTES
"TIME RECORD    Date: 3/1/2018      Start Time:  4:00 pm   Stop Time:  4:45 pm     PROCEDURES:    TIMED  Procedure Min.   TE supervised 5' NC   TE 40    MT               UNTIMED  Procedure Min.             Total Timed Minutes:  45'   Total Timed Units:  3  Total Untimed Units:  0  Charges Billed/# of units:  3 ( 3 TE)      Progress/Current Status    Subjective:     Patient ID: Darrian Velez is a 63 y.o. male.  Diagnosis:   1. Left ankle pain, unspecified chronicity     2. Impaired gait     3. Decreased strength of lower extremity       Pain: 0/10   Pt stated that his (L) ankle feels fine, no pain since last week. Has not worn brace since last Friday.  Objective:     Pt initiated treatment with supervised bike for active warm up and to promote joint nutrition supervised.   Therex performed as per log 1:1 with PTA x 40 minutes.      Date 3/1/18 2/27/18 2/22/18 2/20/18 2/15/18 2/7/18   Visit 6/12 5/12 4/12 3/12 2/12 1/12   POC exp  4/7/18         FOTO DONE 5/5 4/5 3/5 2/5 1/5   bike 5' 5'  5' 5'      MT  -- 10'  10' 10'      gastroc stretch  3x30" fitter Standing fitter 3x30"  Standing fitter 3x30"      gastroc stretch  3x30" seated  3x30" seated on EOM 30"x3 seated 3x30" seated 3x30" seated   Soleus stretch    30"x3 seated 3x30" seated    Ankle tband   4 directions GTB 2x10 ea. RTB 2x10 ea YTB DF/PF/IN/EV  2x12 L AROM YTB DF/PF/IV 2x12    EV AROM 2x12 YTB DF/PF/IV 2x10     EV held YTB DF/PF/IV 2x10     EV held   BAPS (seated)  - 2x12 L3  6 way 2x12 L 6 way 2x10 6-way -   Towel crunch  - 2' x1 1'x2 2x1' -   Towel inv/ev  - Inv 2x1' Ev 1' Inv 2x1' Ever 1' Inv 2x1' -   Dowel roll  - 2' standing UE sup 2' 2x1'    Ankle AROM      -   Heel/toe raise  - 2x10 B  stand 2x10 ea             //bars      -   Heel/toe raise SL 2x10 ea B SL 2x10 ea B 2x10 B 2x10 ea DL  -   BAPS 6 way 20 each Standing 2x10 ea    -   SLS B 1'x2 //Bars B 3x30" // B 3x30" B no UE sup B 30"x2 ea   1 UE support  -   Steamboats 3 Way abd/ext/flex 2x10 " on Gr foam w GTT                          rebounder 1x20 Fwd B 1x20 fwd B       Step ups 2x10 no UE sup next       Step downs 2x10  w/2 UE support next       Tandem walking on blue foam 2 pads 4 laps //  occ UE sup        CP Home home 10'      Seen by MB 1/6 CK MB 2/6 DH 1/6 KV CK       Assessment:     Pt was able to tolerate progression of standing there ex again this visit. Pt demo impaired balance and ankle stability on (B) LE (L) > (R) so both included. Pt tolerated therapy session pain free throughout visit. MT deferred secondary to no pain. Continue to progress standing therex as tolerated.     Patient Education/Response:     Continue with HEP    Plans and Goals:     Cont POC. Progress as able.    Short Term Goals = Long Term Goals (8 Weeks): 4/7/18  1. Pt will be independent with HEP  2. Pt will improve (L) ankle strength to 5/5 in order to improve functional mobility  3. Pt will perform (L) ankle AROM WNL in all directions with c/o 0/10 pain in order to improve functional mobility  4. Pt will improve FOTO ankle survey score to </= 25% limited   5. Pt will report ability to ambulate community distances with 0/10 pain in (L) ankle

## 2018-03-06 ENCOUNTER — CLINICAL SUPPORT (OUTPATIENT)
Dept: REHABILITATION | Facility: HOSPITAL | Age: 63
End: 2018-03-06
Payer: COMMERCIAL

## 2018-03-06 DIAGNOSIS — R29.898 DECREASED STRENGTH OF LOWER EXTREMITY: ICD-10-CM

## 2018-03-06 DIAGNOSIS — M25.572 LEFT ANKLE PAIN, UNSPECIFIED CHRONICITY: ICD-10-CM

## 2018-03-06 DIAGNOSIS — R26.9 IMPAIRED GAIT: ICD-10-CM

## 2018-03-06 PROCEDURE — 97110 THERAPEUTIC EXERCISES: CPT | Mod: PN

## 2018-03-06 NOTE — PROGRESS NOTES
"TIME RECORD    Date: 3/6/2018      Start Time:  4:02 pm   Stop Time:  4:45 pm     PROCEDURES:    TIMED  Procedure Min.   TE supervised 20' NC   TE 23'                  UNTIMED  Procedure Min.             Total Timed Minutes:  23'   Total Timed Units:  2  Total Untimed Units:  0  Charges Billed/# of units:  2 TE      Progress/Current Status    Subjective:     Patient ID: Darrian Velez is a 63 y.o. male.  Diagnosis:   1. Left ankle pain, unspecified chronicity     2. Impaired gait     3. Decreased strength of lower extremity       Pain: 0 /10  Pt stated that he has not been experiencing pain in (L) ankle.     Objective:     Pt initiated treatment with supervised bike for active warm up and to promote joint nutrition supervised x 5'.   Therex performed as per log 1:1 with PT x 23 minutes and supervised therex x 15'.      Date 3/6/18 3/1/18 2/27/18 2/22/18 2/20/18 2/15/18 2/7/18   Visit 7/12 6/12 5/12 4/12 3/12 2/12 1/12   POC exp  4/7/18          FOTO  DONE 5/5 4/5 3/5 2/5 1/5   bike 5'  5' 5'  5' 5'      MT   -- 10'  10' 10'      gastroc stretch  3x30" fitter 3x30" fitter Standing fitter 3x30"  Standing fitter 3x30"      gastroc stretch   3x30" seated  3x30" seated on EOM 30"x3 seated 3x30" seated 3x30" seated   Soleus stretch     30"x3 seated 3x30" seated    Ankle tband   4 directions GTB 2x15 GTB 2x10 ea. RTB 2x10 ea YTB DF/PF/IN/EV  2x12 L AROM YTB DF/PF/IV 2x12    EV AROM 2x12 YTB DF/PF/IV 2x10     EV held YTB DF/PF/IV 2x10     EV held   BAPS (seated)   - 2x12 L3  6 way 2x12 L 6 way 2x10 6-way -   Towel crunch   - 2' x1 1'x2 2x1' -   Towel inv/ev   - Inv 2x1' Ev 1' Inv 2x1' Ever 1' Inv 2x1' -   Dowel roll   - 2' standing UE sup 2' 2x1'    Ankle AROM       -   Heel/toe raise   - 2x10 B  stand 2x10 ea              //bars       -   Heel/toe raise SL 2x10 ea B SL 2x10 ea B SL 2x10 ea B 2x10 B 2x10 ea DL  -   BAPS 6 way 20 ea 20 each Standing 2x10 ea    -   SLS B 1' x 2 // bars B 1'x2 //Bars B 3x30" // B 3x30" B " "no UE sup B 30"x2 ea   1 UE support  -   Steamboats 3 Way abd/ext/flex 2x10 grn foam GTB B 2x10 on Gr foam w GTT                            rebounder 1x20 fwd B 1x20 Fwd B 1x20 fwd B       Step ups 2x15 blue 2x10 no UE sup next       Step downs 2x10 blue 2x10  w/2 UE support next       Tandem walking on blue foam Fwd/bkwd 5x  2 pads 4 laps //  occ UE sup        CP  Home home 10'      Seen by CK MB 1/6 CK MB 2/6 DH 1/6 KV CK       Assessment:     Pt was able to tolerate all therex noted per log above without any c/o increased pain in (L) ankle. Pt has made steady progress with his PT treatments. Will progress down to 1x/week for 2 weeks and if pt continues to report 0/10 pain in (L) ankle plan to  DC in 2 weeks.     Patient Education/Response:     Continue with HEP    Plans and Goals:     Cont POC. Progress as able. Progress to 1x/week    Short Term Goals = Long Term Goals (8 Weeks): 4/7/18  1. Pt will be independent with HEP  2. Pt will improve (L) ankle strength to 5/5 in order to improve functional mobility  3. Pt will perform (L) ankle AROM WNL in all directions with c/o 0/10 pain in order to improve functional mobility  4. Pt will improve FOTO ankle survey score to </= 25% limited   5. Pt will report ability to ambulate community distances with 0/10 pain in (L) ankle    "

## 2018-03-13 ENCOUNTER — CLINICAL SUPPORT (OUTPATIENT)
Dept: REHABILITATION | Facility: HOSPITAL | Age: 63
End: 2018-03-13
Payer: COMMERCIAL

## 2018-03-13 DIAGNOSIS — M25.572 LEFT ANKLE PAIN, UNSPECIFIED CHRONICITY: ICD-10-CM

## 2018-03-13 DIAGNOSIS — R29.898 DECREASED STRENGTH OF LOWER EXTREMITY: ICD-10-CM

## 2018-03-13 DIAGNOSIS — R26.9 IMPAIRED GAIT: ICD-10-CM

## 2018-03-13 PROCEDURE — 97110 THERAPEUTIC EXERCISES: CPT | Mod: PN

## 2018-03-13 NOTE — PROGRESS NOTES
"TIME RECORD    Date: 3/13/2018      Start Time:  355  Stop Time:  500    PROCEDURES:    TIMED  Procedure Min.   Therex 45       UNTIMED  Procedure Min.   Bike 5   Cold pack 10     Total Timed Minutes:  45  Total Timed Units:  3  Total Untimed Units:  0  Charges Billed/# of units:  3 TE      Progress/Current Status    Subjective:     Patient ID: Darrian Velez is a 63 y.o. male.  Diagnosis:   1. Left ankle pain, unspecified chronicity     2. Impaired gait     3. Decreased strength of lower extremity       "I haven't felt any pain in about a week."    Objective:     Pt initiated treatment with supervised bike for active warm up and to promote joint nutrition supervised x 5'.  Therex performed as per log 1:1 with PTA x 45 minutes. Cold pack x 10 minutes to left ankle in sitting with elevation.    Date 3/13/18 3/6/18 3/1/18 2/27/18 2/22/18 2/20/18 2/15/18 2/7/18   Visit 8/12 7/12 6/12 5/12 4/12 3/12 2/12 1/12   POC exp  4/7/18           FOTO At d/c  DONE 5/5 4/5 3/5 2/5 1/5   bike 5' 5'  5' 5'  5' 5'      MT    -- 10'  10' 10'      gastroc stretch  30"x3 fitter 3x30" fitter 3x30" fitter Standing fitter 3x30"  Standing fitter 3x30"      gastroc stretch above   3x30" seated  3x30" seated on EOM 30"x3 seated 3x30" seated 3x30" seated   Soleus stretch      30"x3 seated 3x30" seated    Ankle tband   4 directions GTB 2x15 GTB 2x15 GTB 2x10 ea. RTB 2x10 ea YTB DF/PF/IN/EV  2x12 L AROM YTB DF/PF/IV 2x12    EV AROM 2x12 YTB DF/PF/IV 2x10     EV held YTB DF/PF/IV 2x10     EV held   BAPS (seated) --   - 2x12 L3  6 way 2x12 L 6 way 2x10 6-way -   Towel crunch --   - 2' x1 1'x2 2x1' -   Towel inv/ev --   - Inv 2x1' Ev 1' Inv 2x1' Ever 1' Inv 2x1' -   Dowel roll --   - 2' standing UE sup 2' 2x1'    Ankle AROM --       -   Heel/toe raise --   - 2x10 B  stand 2x10 ea               //bars        -   Heel/toe raise DL 2x10   SL 2x10 ea B SL 2x10 ea B SL 2x10 ea B SL 2x10 ea B 2x10 B 2x10 ea DL  -   BAPS 6 way L3 2x12 6 way 20 ea 20 " "each Standing 2x10 ea    -   SLS  B 1' x 2 // bars B 1'x2 //Bars B 3x30" // B 3x30" B no UE sup B 30"x2 ea   1 UE support  -   Steamboats 3 Way abd/ext/flex 2x12 B GTB  On grn pad 2x10 grn foam GTB B 2x10 on Gr foam w GTT                              rebounder 2x10 L 3 way 1x20 fwd B 1x20 Fwd B 1x20 fwd B       Step ups 2x15 blue L no UE 2x15 blue 2x10 no UE sup next       Step downs 2x12 blue  No UE 2x10 blue 2x10  w/2 UE support next       Tandem walking on blue foam Fwd/back 2 pads  5 laps ea Fwd/bkwd 5x  2 pads 4 laps //  occ UE sup        CP 10'  Home home 10'      Seen by DH 1/6 CK MB 1/6 CK MB 2/6 DH 1/6 KV CK       Assessment:     Patient able to complete increased reps as noted without complaint of pain.  Performed rebounder with some difficulty maintaining SLS especially with side tossing, but able to complete.    Patient Education/Response:     Patient educated to continue HEP.  Verbalized understanding.    Plans and Goals:     Cont POC. Progress as able. Progress to 1x/week    Short Term Goals = Long Term Goals (8 Weeks): 4/7/18  1. Pt will be independent with HEP  2. Pt will improve (L) ankle strength to 5/5 in order to improve functional mobility  3. Pt will perform (L) ankle AROM WNL in all directions with c/o 0/10 pain in order to improve functional mobility  4. Pt will improve FOTO ankle survey score to </= 25% limited   5. Pt will report ability to ambulate community distances with 0/10 pain in (L) ankle    "

## 2018-03-20 ENCOUNTER — CLINICAL SUPPORT (OUTPATIENT)
Dept: REHABILITATION | Facility: HOSPITAL | Age: 63
End: 2018-03-20
Payer: COMMERCIAL

## 2018-03-20 DIAGNOSIS — R26.9 IMPAIRED GAIT: ICD-10-CM

## 2018-03-20 DIAGNOSIS — R29.898 DECREASED STRENGTH OF LOWER EXTREMITY: ICD-10-CM

## 2018-03-20 DIAGNOSIS — M25.572 LEFT ANKLE PAIN, UNSPECIFIED CHRONICITY: ICD-10-CM

## 2018-03-20 PROCEDURE — 97110 THERAPEUTIC EXERCISES: CPT | Mod: PN

## 2018-03-20 NOTE — PROGRESS NOTES
"DAILY TREATMENT NOTE      Start Time:  4:04 pm   Stop Time:  4:33 pm     PROCEDURES:    TIMED  Procedure Min.   TE 29'                      UNTIMED  Procedure Min.             Total Timed Minutes:  29'  Total Timed Units:  2  Total Untimed Units:  0  Charges Billed/# of units:  2 TE      Progress/Current Status    Subjective:     Patient ID: Darrian Velez is a 63 y.o. male.  Diagnosis:   1. Left ankle pain, unspecified chronicity     2. Impaired gait     3. Decreased strength of lower extremity       Pain: 0 /10  Pt stated that he has not been experiencing pain in (L) ankle. Pt reported that he was able to cut the grass this past weekend without any pain in (L) ankle.   Objective:     Objective measurements were taken and pt participated in therex per log below 1:1 with PT x 29'.    Date 3/20/18 3/13/18 3/6/18 3/1/18 2/27/18 2/22/18 2/20/18 2/15/18 2/7/18   Visit 9/12 8/12 7/12 6/12 5/12 4/12 3/12 2/12 1/12   POC exp  4/7/18            FOTO  At d/c  DONE 5/5 4/5 3/5 2/5 1/5   bike  5' 5'  5' 5'  5' 5'      MT     -- 10'  10' 10'      gastroc stretch  30"x3 fitter  30"x3 fitter 3x30" fitter 3x30" fitter Standing fitter 3x30"  Standing fitter 3x30"      gastroc stretch  above   3x30" seated  3x30" seated on EOM 30"x3 seated 3x30" seated 3x30" seated   Soleus stretch       30"x3 seated 3x30" seated    Ankle tband   4 directions GTB 2x15 GTB 2x15 GTB 2x15 GTB 2x10 ea. RTB 2x10 ea YTB DF/PF/IN/EV  2x12 L AROM YTB DF/PF/IV 2x12    EV AROM 2x12 YTB DF/PF/IV 2x10     EV held YTB DF/PF/IV 2x10     EV held   BAPS (seated)  --   - 2x12 L3  6 way 2x12 L 6 way 2x10 6-way -   Towel crunch  --   - 2' x1 1'x2 2x1' -   Towel inv/ev  --   - Inv 2x1' Ev 1' Inv 2x1' Ever 1' Inv 2x1' -   Dowel roll  --   - 2' standing UE sup 2' 2x1'    Ankle AROM  --       -   Heel/toe raise  --   - 2x10 B  stand 2x10 ea                //bars         -   Heel/toe raise 2x15 DL 2x10   SL 2x10 ea B SL 2x10 ea B SL 2x10 ea B SL 2x10 ea B 2x10 B 2x10 " "ea DL  -   BAPS 6 way  L3 2x12 6 way 20 ea 20 each Standing 2x10 ea    -   SLS   B 1' x 2 // bars B 1'x2 //Bars B 3x30" // B 3x30" B no UE sup B 30"x2 ea   1 UE support  -   Steamboats 3 Way abd/ext/flex GTB 2x15 B  2x12 B GTB  On grn pad 2x10 grn foam GTB B 2x10 on Gr foam w GTT                                rebounder  2x10 L 3 way 1x20 fwd B 1x20 Fwd B 1x20 fwd B       Step ups  2x15 blue L no UE 2x15 blue 2x10 no UE sup next       Step downs  2x12 blue  No UE 2x10 blue 2x10  w/2 UE support next       Tandem walking on blue foam  Fwd/back 2 pads  5 laps ea Fwd/bkwd 5x  2 pads 4 laps //  occ UE sup        CP  10'  Home home 10'      Seen by CK DH 1/6 CK MB 1/6 CK MB 2/6 DH 1/6 KV CK       Assessment:     See DC summary below    Patient Education/Response:     See DC summary below    Plans and Goals:     DC from PT     REHAB SERVICES OUTPATIENT DISCHARGE SUMMARY  Physical Therapy      Name:  Darrian Velez  Date:  3/20/18  Date of Evaluation:  2/7/18  Physician:  Jaspreet Sosa DO  Total # Of Visits:  9  Diagnosis:    1. Left ankle pain, unspecified chronicity     2. Impaired gait     3. Decreased strength of lower extremity         Physical/Functional Status:  At time of discharge, patient had attended 8 PT follow up sessions since initial evaluation on 2/7/18. Pt has made steady progress with his PT treatments as evident by subjective and objective improvements. Pt reports 0/10 pain in (L) ankle, was able to perform (L) ankle AROM in all directions with no reports of pain, improved (L) ankle strength, demo improved SLS balance, and improved FOTO score compared to initial evaluation. Pt was agreeable to being discharged with [facility] at this time. See objective measurements below.     Range of Motion/Strength: 3/20/18    Hip Right   Left   Pain/Dysfunction with Movement     AROM MMT AROM MMT     Flexion WFL 5/5 WFL 5/5     Extension NT NT NT NT Able to perform good bridge against gravity    Abduction WFL 5/5 WFL " 5/5        Knee Right   Left   Pain/Dysfunction with Movement     AROM MMT AROM MMT     Flexion WFL 5/5 WFL 5/5     Extension WFL 5/5 WFL 5/5        Ankle Right     Left   Pain/Dysfunction with Movement     AROM MMT AROM PROM MMT ! = pain   Plantarflexion 47  5/5 48 NT 5/5    Dorsiflexion 3  5/5 3 NT 5/5    Inversion 35  5/5 35 NT 5/5    Eversion 10  5/5 15 NT 5/5      FOTO: 1% limited   30 second SLS test: (B) : 30 seconds w/increased ankle strategy noted      Range of Motion/Strength: initial evaluation    Hip Right   Left   Pain/Dysfunction with Movement     AROM MMT AROM MMT     Flexion WFL 5/5 WFL 5/5     Extension NT NT NT NT Able to perform good bridge against gravity    Abduction WFL 5/5 WFL 5/5        Knee Right   Left   Pain/Dysfunction with Movement     AROM MMT AROM MMT     Flexion WFL 5/5 WFL 5/5     Extension WFL 5/5 WFL 5/5        Ankle Right     Left   Pain/Dysfunction with Movement     AROM MMT AROM PROM MMT ! = pain   Plantarflexion 47   46! NT 5/5 C/o pain in (L) achilles tendon region   Dorsiflexion -3   3! NT 4+/5! C/o pain in (L) anterior ankle   Inversion 35   35! NT 4+/5! C/o pain in (L)  anterior ankle   Eversion 10   15! NT 4+/5! C/o pain in (L) anterior ankle     FOTO: 42% limited    The patient is to be discharged from our Therapy service for the following reason(s):  Patient is now asymptomatic and Patient has reached the maximum rehab potential for the present time    Degree of Goal Achievement:  Patient has met all goals    Short Term Goals = Long Term Goals (8 Weeks): 4/7/18  1. Pt will be independent with HEP - MET  2. Pt will improve (L) ankle strength to 5/5 in order to improve functional mobility  3. Pt will perform (L) ankle AROM WNL in all directions with c/o 0/10 pain in order to improve functional mobility - MET  4. Pt will improve FOTO ankle survey score to </= 25% limited  - MET  5. Pt will report ability to ambulate community distances with 0/10 pain in (L) ankle -  MET    Patient Education:  Pt was educated on and given handout on updated HEP. Pt demo/verbalized understanding. (see media section)     Discharge Plan:  Home Program:  See above. Follow up with MD prn.

## 2018-03-20 NOTE — Clinical Note
Please see PT DC summary for Darrian Velez,  59. Thank you for this referral.   Amy Cabrera, PT 3/20/2018

## 2018-05-31 DIAGNOSIS — E78.5 DYSLIPIDEMIA: ICD-10-CM

## 2018-05-31 DIAGNOSIS — I10 ESSENTIAL HYPERTENSION: ICD-10-CM

## 2018-05-31 RX ORDER — LOSARTAN POTASSIUM AND HYDROCHLOROTHIAZIDE 12.5; 5 MG/1; MG/1
TABLET ORAL
Qty: 30 TABLET | Refills: 11 | Status: SHIPPED | OUTPATIENT
Start: 2018-05-31 | End: 2019-06-17 | Stop reason: SDUPTHER

## 2018-05-31 RX ORDER — ATORVASTATIN CALCIUM 10 MG/1
TABLET, FILM COATED ORAL
Qty: 30 TABLET | Refills: 11 | Status: SHIPPED | OUTPATIENT
Start: 2018-05-31 | End: 2019-06-17 | Stop reason: SDUPTHER

## 2018-08-22 DIAGNOSIS — E78.5 DYSLIPIDEMIA: ICD-10-CM

## 2018-08-22 RX ORDER — FENOFIBRATE 134 MG/1
CAPSULE ORAL
Qty: 30 CAPSULE | Refills: 11 | Status: SHIPPED | OUTPATIENT
Start: 2018-08-22 | End: 2019-10-09 | Stop reason: SDUPTHER

## 2019-01-07 ENCOUNTER — OFFICE VISIT (OUTPATIENT)
Dept: URGENT CARE | Facility: CLINIC | Age: 64
End: 2019-01-07
Payer: COMMERCIAL

## 2019-01-07 ENCOUNTER — TELEPHONE (OUTPATIENT)
Dept: URGENT CARE | Facility: CLINIC | Age: 64
End: 2019-01-07

## 2019-01-07 VITALS
HEIGHT: 67 IN | BODY MASS INDEX: 38.45 KG/M2 | WEIGHT: 245 LBS | OXYGEN SATURATION: 96 % | HEART RATE: 85 BPM | SYSTOLIC BLOOD PRESSURE: 143 MMHG | TEMPERATURE: 98 F | DIASTOLIC BLOOD PRESSURE: 65 MMHG | RESPIRATION RATE: 18 BRPM

## 2019-01-07 DIAGNOSIS — S80.01XA CONTUSION OF RIGHT KNEE, INITIAL ENCOUNTER: ICD-10-CM

## 2019-01-07 DIAGNOSIS — S89.91XA INJURY OF RIGHT KNEE, INITIAL ENCOUNTER: Primary | ICD-10-CM

## 2019-01-07 PROCEDURE — 99214 PR OFFICE/OUTPT VISIT, EST, LEVL IV, 30-39 MIN: ICD-10-PCS | Mod: 25,S$GLB,, | Performed by: NURSE PRACTITIONER

## 2019-01-07 PROCEDURE — 73562 X-RAY EXAM OF KNEE 3: CPT | Mod: FY,RT,S$GLB, | Performed by: RADIOLOGY

## 2019-01-07 PROCEDURE — 96372 THER/PROPH/DIAG INJ SC/IM: CPT | Mod: S$GLB,,, | Performed by: NURSE PRACTITIONER

## 2019-01-07 PROCEDURE — 73562 XR KNEE 3 VIEW RIGHT: ICD-10-PCS | Mod: FY,RT,S$GLB, | Performed by: RADIOLOGY

## 2019-01-07 PROCEDURE — 99214 OFFICE O/P EST MOD 30 MIN: CPT | Mod: 25,S$GLB,, | Performed by: NURSE PRACTITIONER

## 2019-01-07 PROCEDURE — 96372 PR INJECTION,THERAP/PROPH/DIAG2ST, IM OR SUBCUT: ICD-10-PCS | Mod: S$GLB,,, | Performed by: NURSE PRACTITIONER

## 2019-01-07 RX ORDER — DICLOFENAC SODIUM 75 MG/1
75 TABLET, DELAYED RELEASE ORAL 2 TIMES DAILY WITH MEALS
Qty: 60 TABLET | Refills: 0 | Status: SHIPPED | OUTPATIENT
Start: 2019-01-07 | End: 2019-01-21

## 2019-01-07 RX ORDER — KETOROLAC TROMETHAMINE 30 MG/ML
30 INJECTION, SOLUTION INTRAMUSCULAR; INTRAVENOUS
Status: COMPLETED | OUTPATIENT
Start: 2019-01-07 | End: 2019-01-07

## 2019-01-07 RX ADMIN — KETOROLAC TROMETHAMINE 30 MG: 30 INJECTION, SOLUTION INTRAMUSCULAR; INTRAVENOUS at 05:01

## 2019-01-07 NOTE — PATIENT INSTRUCTIONS
Do not take any other antiinflammatories today since your received a shot. Start your prescription tomorrow.     Follow up with your primary care if your pain is persistent despite treatment today.     You must understand that you've received an Urgent Care treatment only and that you may be released before all your medical problems are known or treated. You, the patient, will arrange for follow up care as instructed.  If your condition worsens we recommend that you receive another evaluation at the emergency room immediately or contact your primary medical clinics after hours call service to discuss your concerns.  Please return here or go to the Emergency Department for any concerns or worsening of condition.      Soft Tissue Contusion  You have a contusion. This is also called a bruise. There is swelling and some bleeding under the skin. This injury generally takes a few days to a few weeks to heal.  During that time, the bruise will typically change in color from reddish, to purple-blue, to greenish-yellow, then to yellow-brown.  Home care  · Elevate the injured area to reduce pain and swelling. As much as possible, sit or lie down with the injured area raised about the level of your heart. This is especially important during the first 48 hours.  · Ice the injured area to help reduce pain and swelling. Wrap a cold source (ice pack or ice cubes in a plastic bag) in a thin towel. Apply to the bruised area for 20 minutes every 1 to 2 hours the first day. Continue this 3 to 4 times a day until the pain and swelling goes away.  · Unless another medication was prescribed, you can take acetaminophen, ibuprofen, or naproxen to control pain. (If you have chronic liver or kidney disease or ever had a stomach ulcer or GI bleeding, talk with your doctor before using these medicines.)  Follow up  Follow up with your health care provider or our staff as advised. Call if you are not better in 1 to 2 weeks.  When to seek  medical advice   Call your health care provider right away if you have any of the following:  · Increased pain or swelling  · Bruise is on an arm or leg and arm or leg becomes cold, blue, numb or tingly  · Signs of infection: Warmth, drainage, or increased redness or pain around the contusion  · Inability to move the injured area or body part   · Bruise is near your eye and you have problems with your eyesight or eye   · Frequent bruising for unknown reasons  Date Last Reviewed: 4/29/2015 © 2000-2017 [employer]. [address] [place] [id number]. All rights reserved. This information is not intended as a substitute for professional medical care. Always follow your healthcare professional's instructions.

## 2019-01-07 NOTE — PROGRESS NOTES
"Subjective:       Patient ID: Darrian Velez is a 63 y.o. male.    Vitals:  height is 5' 7" (1.702 m) and weight is 111.1 kg (245 lb). His temperature is 97.7 °F (36.5 °C). His blood pressure is 143/65 (abnormal) and his pulse is 85. His respiration is 18 and oxygen saturation is 96%.     Chief Complaint: Knee Pain    Pt was [place] with a [place] [employer] to right knee and states it is still swollen.       Knee Pain    The incident occurred more than 1 week ago (12 days). The injury mechanism was a direct blow. The pain is present in the right knee. The quality of the pain is described as aching, burning, cramping, shooting and stabbing. The pain is at a severity of 9/10. The pain is moderate. The pain has been fluctuating since onset. The symptoms are aggravated by movement and weight bearing. He has tried acetaminophen for the symptoms. The treatment provided mild relief.       Constitution: Negative for chills, fatigue and fever.   HENT: Negative for congestion and sore throat.    Neck: Negative for painful lymph nodes.   Cardiovascular: Negative for chest pain and leg swelling.   Eyes: Negative for double vision and blurred vision.   Respiratory: Negative for cough and shortness of breath.    Gastrointestinal: Negative for nausea, vomiting and diarrhea.   Genitourinary: Negative for dysuria, frequency and urgency.   Musculoskeletal: Positive for joint pain and joint swelling. Negative for muscle cramps and muscle ache.   Skin: Negative for color change, pale and rash.   Allergic/Immunologic: Negative for seasonal allergies.   Neurological: Negative for dizziness, history of vertigo, light-headedness, passing out and headaches.   Hematologic/Lymphatic: Negative for swollen lymph nodes, easy bruising/bleeding and history of blood clots. Does not bruise/bleed easily.   Psychiatric/Behavioral: Negative for nervous/anxious, sleep disturbance and depression. The patient is not nervous/anxious.        Objective:    "   Physical Exam   Constitutional: He is oriented to person, place, and time. He appears well-developed and well-nourished.   Cardiovascular: Normal rate, regular rhythm, normal heart sounds and intact distal pulses.   Pulmonary/Chest: Effort normal and breath sounds normal.   Musculoskeletal: He exhibits tenderness.        Right knee: He exhibits swelling. He exhibits normal range of motion, no effusion, no ecchymosis, no deformity, no laceration, no erythema, normal alignment, no LCL laxity, normal patellar mobility, no bony tenderness, normal meniscus and no MCL laxity. Tenderness found. Medial joint line tenderness noted. No lateral joint line, no MCL, no LCL and no patellar tendon tenderness noted.   Mild limping during exam.    Neurological: He is alert and oriented to person, place, and time.   Skin: Skin is warm and dry.       X-ray: No evidence of fracture, dislocation, or osseous destructive process.  Joint spaces are preserved.  Mild enthesopathic change is seen at the superior patellar quadriceps insertion site.  No suprapatellar joint effusion.  Assessment:       1. Injury of right knee, initial encounter    2. Contusion of right knee, initial encounter        Plan:         Injury of right knee, initial encounter  -     X-Ray Knee 3 View Right; Future; Expected date: 01/07/2019  -     diclofenac (VOLTAREN) 75 MG EC tablet; Take 1 tablet (75 mg total) by mouth 2 (two) times daily with meals.  Dispense: 60 tablet; Refill: 0  -     ketorolac injection 30 mg    Contusion of right knee, initial encounter  -     BANDAGE ELASTIC 4IN ACE NS            Patient Instructions   Do not take any other antiinflammatories today since your received a shot. Start your prescription tomorrow.     Follow up with your primary care if your pain is persistent despite treatment today.     You must understand that you've received an Urgent Care treatment only and that you may be released before all your medical problems are known  or treated. You, the patient, will arrange for follow up care as instructed.  If your condition worsens we recommend that you receive another evaluation at the emergency room immediately or contact your primary medical clinics after hours call service to discuss your concerns.  Please return here or go to the Emergency Department for any concerns or worsening of condition.      Soft Tissue Contusion  You have a contusion. This is also called a bruise. There is swelling and some bleeding under the skin. This injury generally takes a few days to a few weeks to heal.  During that time, the bruise will typically change in color from reddish, to purple-blue, to greenish-yellow, then to yellow-brown.  Home care  · Elevate the injured area to reduce pain and swelling. As much as possible, sit or lie down with the injured area raised about the level of your heart. This is especially important during the first 48 hours.  · Ice the injured area to help reduce pain and swelling. Wrap a cold source (ice pack or ice cubes in a plastic bag) in a thin towel. Apply to the bruised area for 20 minutes every 1 to 2 hours the first day. Continue this 3 to 4 times a day until the pain and swelling goes away.  · Unless another medication was prescribed, you can take acetaminophen, ibuprofen, or naproxen to control pain. (If you have chronic liver or kidney disease or ever had a stomach ulcer or GI bleeding, talk with your doctor before using these medicines.)  Follow up  Follow up with your health care provider or our staff as advised. Call if you are not better in 1 to 2 weeks.  When to seek medical advice   Call your health care provider right away if you have any of the following:  · Increased pain or swelling  · Bruise is on an arm or leg and arm or leg becomes cold, blue, numb or tingly  · Signs of infection: Warmth, drainage, or increased redness or pain around the contusion  · Inability to move the injured area or body  part   · Bruise is near your eye and you have problems with your eyesight or eye   · Frequent bruising for unknown reasons  Date Last Reviewed: 4/29/2015  © 1086-0309 [employer]. [address] [place] [address], [place] [address]. All rights reserved. This information is not intended as a substitute for professional medical care. Always follow your healthcare professional's instructions.

## 2019-01-21 ENCOUNTER — OFFICE VISIT (OUTPATIENT)
Dept: FAMILY MEDICINE | Facility: CLINIC | Age: 64
End: 2019-01-21
Attending: FAMILY MEDICINE
Payer: COMMERCIAL

## 2019-01-21 VITALS
TEMPERATURE: 98 F | HEIGHT: 68 IN | HEART RATE: 74 BPM | DIASTOLIC BLOOD PRESSURE: 82 MMHG | WEIGHT: 251.31 LBS | SYSTOLIC BLOOD PRESSURE: 140 MMHG | BODY MASS INDEX: 38.09 KG/M2 | OXYGEN SATURATION: 97 %

## 2019-01-21 DIAGNOSIS — M25.561 CHRONIC PAIN OF RIGHT KNEE: Primary | ICD-10-CM

## 2019-01-21 DIAGNOSIS — G89.29 CHRONIC PAIN OF RIGHT KNEE: Primary | ICD-10-CM

## 2019-01-21 DIAGNOSIS — S89.91XA INJURY OF RIGHT KNEE, INITIAL ENCOUNTER: ICD-10-CM

## 2019-01-21 PROCEDURE — 99213 OFFICE O/P EST LOW 20 MIN: CPT | Mod: S$GLB,,, | Performed by: FAMILY MEDICINE

## 2019-01-21 PROCEDURE — 99213 PR OFFICE/OUTPT VISIT, EST, LEVL III, 20-29 MIN: ICD-10-PCS | Mod: S$GLB,,, | Performed by: FAMILY MEDICINE

## 2019-01-21 PROCEDURE — 99999 PR PBB SHADOW E&M-EST. PATIENT-LVL V: CPT | Mod: PBBFAC,,, | Performed by: FAMILY MEDICINE

## 2019-01-21 PROCEDURE — 99999 PR PBB SHADOW E&M-EST. PATIENT-LVL V: ICD-10-PCS | Mod: PBBFAC,,, | Performed by: FAMILY MEDICINE

## 2019-01-21 RX ORDER — TRAMADOL HYDROCHLORIDE 50 MG/1
50 TABLET ORAL EVERY 6 HOURS PRN
Qty: 28 TABLET | Refills: 0 | Status: SHIPPED | OUTPATIENT
Start: 2019-01-21 | End: 2019-03-07

## 2019-01-21 RX ORDER — GARLIC 500 MG
1 CAPSULE ORAL DAILY
COMMUNITY
End: 2023-08-04

## 2019-01-21 RX ORDER — DICLOFENAC SODIUM 10 MG/G
2 GEL TOPICAL 4 TIMES DAILY
Qty: 100 G | Refills: 2 | Status: SHIPPED | OUTPATIENT
Start: 2019-01-21 | End: 2020-07-06
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
## 2019-01-25 ENCOUNTER — HOSPITAL ENCOUNTER (OUTPATIENT)
Dept: RADIOLOGY | Facility: HOSPITAL | Age: 64
Discharge: HOME | End: 2019-01-25
Attending: FAMILY MEDICINE
Payer: COMMERCIAL

## 2019-01-25 DIAGNOSIS — G89.29 CHRONIC PAIN OF RIGHT KNEE: ICD-10-CM

## 2019-01-25 DIAGNOSIS — M25.561 CHRONIC PAIN OF RIGHT KNEE: ICD-10-CM

## 2019-01-25 DIAGNOSIS — S89.91XA INJURY OF RIGHT KNEE, INITIAL ENCOUNTER: ICD-10-CM

## 2019-01-25 PROCEDURE — 73721 MRI JNT OF LWR EXTRE W/O DYE: CPT | Mod: TC,RT

## 2019-01-25 PROCEDURE — 73721 MRI JNT OF LWR EXTRE W/O DYE: CPT | Mod: 26,RT,, | Performed by: RADIOLOGY

## 2019-01-25 PROCEDURE — 73721 MRI KNEE WITHOUT CONTRAST RIGHT: ICD-10-PCS | Mod: 26,RT,, | Performed by: RADIOLOGY

## 2019-01-31 ENCOUNTER — OFFICE VISIT (OUTPATIENT)
Dept: ORTHOPEDICS | Facility: CLINIC | Age: 64
End: 2019-01-31
Attending: FAMILY MEDICINE
Payer: COMMERCIAL

## 2019-01-31 DIAGNOSIS — T14.8XXA CONTUSION OF BONE: Primary | ICD-10-CM

## 2019-01-31 PROCEDURE — 99202 PR OFFICE/OUTPT VISIT, NEW, LEVL II, 15-29 MIN: ICD-10-PCS | Mod: S$GLB,,, | Performed by: ORTHOPAEDIC SURGERY

## 2019-01-31 PROCEDURE — 99999 PR PBB SHADOW E&M-EST. PATIENT-LVL II: CPT | Mod: PBBFAC,,, | Performed by: ORTHOPAEDIC SURGERY

## 2019-01-31 PROCEDURE — 99202 OFFICE O/P NEW SF 15 MIN: CPT | Mod: S$GLB,,, | Performed by: ORTHOPAEDIC SURGERY

## 2019-01-31 PROCEDURE — 99999 PR PBB SHADOW E&M-EST. PATIENT-LVL II: ICD-10-PCS | Mod: PBBFAC,,, | Performed by: ORTHOPAEDIC SURGERY
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
## 2019-01-31 NOTE — LETTER
January 31, 2019      Serge Bowles MD  [address]  [address]  [place], [place] [address]           [facility]  [address]  [place], [place] [address]  Phone: [phone]          Patient: Darrian Velez   MR Number: [id number]   YOB: 1955   Date of Visit: 1/31/2019       Dear Dr. Serge Bowles:    Thank you for referring Darrian Velez to me for evaluation. Attached you will find relevant portions of my assessment and plan of care.    If you have questions, please do not hesitate to call me. I look forward to following Darrian Velez along with you.    Sincerely,    Nico Hansen MD    Enclosure  CC:  No Recipients    If you would like to receive this communication electronically, please contact [email or web address] or [phone] to request more information on [facility] access.    For providers and/or their staff who would like to refer a patient to [facility], please contact us through our one-stop-shop provider referral line, [facility], at [phone].    If you feel you have received this communication in error or would no longer like to receive these types of communications, please e-mail [email or web address]

## 2019-03-07 ENCOUNTER — OFFICE VISIT (OUTPATIENT)
Dept: ORTHOPEDICS | Facility: CLINIC | Age: 64
End: 2019-03-07
Payer: COMMERCIAL

## 2019-03-07 VITALS — WEIGHT: 251 LBS | BODY MASS INDEX: 38.04 KG/M2 | HEIGHT: 68 IN

## 2019-03-07 DIAGNOSIS — R93.7 BONE MARROW EDEMA: Primary | ICD-10-CM

## 2019-03-07 PROCEDURE — 99999 PR PBB SHADOW E&M-EST. PATIENT-LVL II: CPT | Mod: PBBFAC,,, | Performed by: ORTHOPAEDIC SURGERY

## 2019-03-07 PROCEDURE — 99214 PR OFFICE/OUTPT VISIT, EST, LEVL IV, 30-39 MIN: ICD-10-PCS | Mod: 25,S$GLB,, | Performed by: ORTHOPAEDIC SURGERY

## 2019-03-07 PROCEDURE — 20610 PR DRAIN/INJECT LARGE JOINT/BURSA: ICD-10-PCS | Mod: RT,S$GLB,, | Performed by: ORTHOPAEDIC SURGERY

## 2019-03-07 PROCEDURE — 99999 PR PBB SHADOW E&M-EST. PATIENT-LVL II: ICD-10-PCS | Mod: PBBFAC,,, | Performed by: ORTHOPAEDIC SURGERY

## 2019-03-07 PROCEDURE — 99214 OFFICE O/P EST MOD 30 MIN: CPT | Mod: 25,S$GLB,, | Performed by: ORTHOPAEDIC SURGERY

## 2019-03-07 PROCEDURE — 20610 DRAIN/INJ JOINT/BURSA W/O US: CPT | Mod: RT,S$GLB,, | Performed by: ORTHOPAEDIC SURGERY

## 2019-03-07 RX ORDER — NAPROXEN AND ESOMEPRAZOLE MAGNESIUM 20; 500 MG/1; MG/1
500 TABLET, DELAYED RELEASE ORAL 2 TIMES DAILY WITH MEALS
Qty: 90 TABLET | Refills: 1 | Status: SHIPPED | OUTPATIENT
Start: 2019-03-07 | End: 2019-12-26

## 2019-03-07 RX ORDER — TRIAMCINOLONE ACETONIDE 40 MG/ML
40 INJECTION, SUSPENSION INTRA-ARTICULAR; INTRAMUSCULAR
Status: COMPLETED | OUTPATIENT
Start: 2019-03-07 | End: 2019-03-07

## 2019-03-07 RX ADMIN — TRIAMCINOLONE ACETONIDE 40 MG: 40 INJECTION, SUSPENSION INTRA-ARTICULAR; INTRAMUSCULAR at 04:03
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
## 2019-04-18 ENCOUNTER — OFFICE VISIT (OUTPATIENT)
Dept: ORTHOPEDICS | Facility: CLINIC | Age: 64
End: 2019-04-18
Payer: COMMERCIAL

## 2019-04-18 VITALS — WEIGHT: 251 LBS | BODY MASS INDEX: 38.04 KG/M2 | HEIGHT: 68 IN

## 2019-04-18 DIAGNOSIS — R93.7 BONE MARROW EDEMA: Primary | ICD-10-CM

## 2019-04-18 PROCEDURE — 99212 PR OFFICE/OUTPT VISIT, EST, LEVL II, 10-19 MIN: ICD-10-PCS | Mod: S$GLB,,, | Performed by: ORTHOPAEDIC SURGERY

## 2019-04-18 PROCEDURE — 99999 PR PBB SHADOW E&M-EST. PATIENT-LVL III: CPT | Mod: PBBFAC,,, | Performed by: ORTHOPAEDIC SURGERY

## 2019-04-18 PROCEDURE — 99999 PR PBB SHADOW E&M-EST. PATIENT-LVL III: ICD-10-PCS | Mod: PBBFAC,,, | Performed by: ORTHOPAEDIC SURGERY

## 2019-04-18 PROCEDURE — 99212 OFFICE O/P EST SF 10 MIN: CPT | Mod: S$GLB,,, | Performed by: ORTHOPAEDIC SURGERY

## 2019-04-18 NOTE — PROGRESS NOTES
Subjective:      Patient ID: Darrian Velez is a 64 y.o. male.    Chief Complaint: Pain of the Right Knee      HPI: Darrian Velez is here in follow-up of right knee pain secondary to bone marrow edema after injury approximately 3 months ago.  He was last seen and treated 6 weeks ago with corticosteroid injection of his right knee. Patient reports injection was very helpful and his symptoms have completely resolved.  Occasionally, if he overdoes it he will feel slight discomfort but resolves with rest.  He continues to use Vimovo and a brace - both which he finds helpful.    Past Medical History:   Diagnosis Date    ALLERGIC RHINITIS     Hx of colonic polyps 4/05/2012    Tubular adenoma    Hyperlipidemia     Hypertension     MRSA infection     left medial thigh abscess & cellulitis    Personal history of kidney stones        Current Outpatient Medications:     ASPIRIN (ASPIR-81 ORAL), , Disp: , Rfl:     atorvastatin (LIPITOR) 10 MG tablet, TAKE ONE TABLET BY MOUTH ONCE DAILY, Disp: 30 tablet, Rfl: 11    diclofenac sodium (VOLTAREN) 1 % Gel, Apply 2 g topically 4 (four) times daily., Disp: 100 g, Rfl: 2    fenofibrate micronized (LOFIBRA) 134 MG Cap, TAKE ONE CAPSULE BY MOUTH ONCE DAILY IN THE EVENING, Disp: 30 capsule, Rfl: 11    garlic 500 mg Cap, Take 1 capsule by mouth once daily., Disp: , Rfl:     losartan-hydrochlorothiazide 50-12.5 mg (HYZAAR) 50-12.5 mg per tablet, TAKE ONE TABLET BY MOUTH ONCE DAILY, Disp: 30 tablet, Rfl: 11    multivitamin (MULTIVITAMIN) per tablet, Take 1 tablet by mouth once daily.  , Disp: , Rfl:     naproxen-esomeprazole (VIMOVO) 500-20 mg TbID, Take 500 mg by mouth 2 (two) times daily with meals., Disp: 90 tablet, Rfl: 1    OMEGA-3 FATTY ACIDS/FISH OIL (OMEGA 3 FISH OIL ORAL), Take by mouth. 1 Capsule By mouth Twice a day , Disp: , Rfl:     pramoxine-hydrocortisone cream, Apply topically 3 (three) times daily., Disp: 57 g, Rfl: 0    loratadine (CLARITIN) 10 mg  "tablet, Take 1 tablet (10 mg total) by mouth once daily., Disp: , Rfl: 0  Review of patient's allergies indicates:   Allergen Reactions    No known allergies        Ht 5' 8" (1.727 m)   Wt 113.9 kg (251 lb)   BMI 38.16 kg/m²     Review of Systems   Constitution: Negative for chills and fever.   Cardiovascular: Negative for chest pain and palpitations.   Respiratory: Negative for shortness of breath and wheezing.    Skin: Negative for poor wound healing and rash.   Musculoskeletal: Negative for falls, joint pain and joint swelling.   Gastrointestinal: Negative for nausea and vomiting.   Genitourinary: Negative for dysuria and hematuria.   Neurological: Negative for seizures and tremors.   Psychiatric/Behavioral: Negative for altered mental status.   Allergic/Immunologic: Negative for environmental allergies and persistent infections.         Objective:    Ortho Exam         Right KNEE:  The patient walks with nonantalgic gait.  Resisted SLR negative.  The skin over the knee is intact.  Knee effusion none.  Tendernes is located none.  Range of motion- Flexion 140 deg, Extension 0 deg,   Ligament exam:              MCL intact              Lachman intact              Post sag intact              LCL intact  Patellar crepitation absent.  Pulses DP present, PT present  Motor normal 5/5 strength in all tested muscle groups.   Sensory normal  GEN: Well developed, well nourished male. AAOX3. No acute distress.   Normocephalic, atraumatic.   AFSANEH  Breathing unlabored.  Mood and affect appropriate.     Assessment:     Imaging:  No new imaging        1. Bone marrow edema     Symptoms resolved        Plan:           Patient was educated that symptoms may flare from time to time.  If this happens, he was instructed to RTC for repeat corticosteroid injection  Continue brace if needed  Continue Vimovo    Follow up if symptoms worsen or fail to improve.        "

## 2019-06-17 DIAGNOSIS — E78.5 DYSLIPIDEMIA: ICD-10-CM

## 2019-06-17 DIAGNOSIS — I10 ESSENTIAL HYPERTENSION: ICD-10-CM

## 2019-06-17 RX ORDER — ATORVASTATIN CALCIUM 10 MG/1
TABLET, FILM COATED ORAL
Qty: 30 TABLET | Refills: 11 | Status: SHIPPED | OUTPATIENT
Start: 2019-06-17 | End: 2020-06-05

## 2019-06-17 RX ORDER — LOSARTAN POTASSIUM AND HYDROCHLOROTHIAZIDE 12.5; 5 MG/1; MG/1
TABLET ORAL
Qty: 30 TABLET | Refills: 11 | Status: SHIPPED | OUTPATIENT
Start: 2019-06-17 | End: 2019-09-25

## 2019-09-25 ENCOUNTER — OFFICE VISIT (OUTPATIENT)
Dept: FAMILY MEDICINE | Facility: CLINIC | Age: 64
End: 2019-09-25
Payer: COMMERCIAL

## 2019-09-25 VITALS
SYSTOLIC BLOOD PRESSURE: 144 MMHG | HEIGHT: 68 IN | WEIGHT: 257.94 LBS | OXYGEN SATURATION: 98 % | BODY MASS INDEX: 39.09 KG/M2 | HEART RATE: 68 BPM | DIASTOLIC BLOOD PRESSURE: 80 MMHG

## 2019-09-25 DIAGNOSIS — Z12.5 SCREENING FOR PROSTATE CANCER: ICD-10-CM

## 2019-09-25 DIAGNOSIS — I10 ESSENTIAL HYPERTENSION: Primary | ICD-10-CM

## 2019-09-25 PROCEDURE — 99999 PR PBB SHADOW E&M-EST. PATIENT-LVL III: ICD-10-PCS | Mod: PBBFAC,,, | Performed by: FAMILY MEDICINE

## 2019-09-25 PROCEDURE — 99999 PR PBB SHADOW E&M-EST. PATIENT-LVL III: CPT | Mod: PBBFAC,,, | Performed by: FAMILY MEDICINE

## 2019-09-25 PROCEDURE — 99214 PR OFFICE/OUTPT VISIT, EST, LEVL IV, 30-39 MIN: ICD-10-PCS | Mod: S$GLB,,, | Performed by: FAMILY MEDICINE

## 2019-09-25 PROCEDURE — 99214 OFFICE O/P EST MOD 30 MIN: CPT | Mod: S$GLB,,, | Performed by: FAMILY MEDICINE

## 2019-09-25 RX ORDER — LOSARTAN POTASSIUM AND HYDROCHLOROTHIAZIDE 12.5; 1 MG/1; MG/1
1 TABLET ORAL DAILY
Qty: 90 TABLET | Refills: 3 | Status: SHIPPED | OUTPATIENT
Start: 2019-09-25 | End: 2020-06-10 | Stop reason: SDUPTHER

## 2019-09-25 NOTE — PATIENT INSTRUCTIONS
Exercise for a Healthier Heart  You may wonder how you can improve the health of your heart. If youre thinking about exercise, youre on the right track. You dont need to become an athlete, but you do need a certain amount of brisk exercise to help strengthen your heart. If you have been diagnosed with a heart condition, your doctor may recommend exercise to help stabilize your condition. To help make exercise a habit, choose safe, fun activities.     Exercise with a friend. When activity is fun, you're more likely to stick with it.     Be sure to check with your healthcare provider before starting an exercise program.   Why exercise?  Exercising regularly offers many healthy rewards. It can help you do all of the following:  · Improve your blood cholesterol level to help prevent further heart trouble  · Lower your blood pressure to help prevent a stroke or heart attack  · Control diabetes, or reduce your risk of getting this disease  · Improve your heart and lung function  · Reach and maintain a healthy weight  · Make your muscles stronger and more limber so you can stay active  · Prevent falls and fractures by slowing the loss of bone mass (osteoporosis)  · Manage stress better  · Reduce your blood pressure  · Improve your sense of self and your body image  Exercise tips  Ease into your routine. Set small goals. Then build on them.  Exercise on most days. Aim for a total of 150 or more minutes of moderate to  vigorous intensity activity each week. Consider 40 minutes, 3 to 4 times a week. For best results, activity should last for 40 minutes on average. It is OK to work up to the 40 minute period over time. Examples of moderate-intensity activity is walking 1 mile in 15 minutes or 30 to 45 minutes of yard work.  Step up your daily activity level. Along with your exercise program, try being more active throughout the day. Walk instead of drive. Do more household tasks or yard work.  Choose one or more  activities you enjoy. Walking is one of the easiest things you can do. You can also try swimming, riding a bike, dancing, or taking an exercise class.  Stop exercising and call your doctor if you:  · Have chest pain or feel dizzy or lightheaded  · Feel burning, tightness, pressure, or heaviness in your chest, neck, shoulders, back, or arms  · Have unusual shortness of breath  · Have increased joint or muscle pain  · Have palpitations or an irregular heartbeat   Date Last Reviewed: 5/1/2016  © 2002-1675 [employer]. [address] [place] [address]. All rights reserved. This information is not intended as a substitute for professional medical care. Always follow your healthcare professional's instructions.

## 2019-09-25 NOTE — PROGRESS NOTES
Subjective:       Patient ID: Darrian Velez is a 64 y.o. male.    Chief Complaint: Follow-up (recheck BP) and Hypertension    64 years old male came to the clinic for blood pressure check.  Blood pressure today is elevated.  No chest pain, palpitation, orthopnea or PND, patient with significant stress at home.    Review of Systems   Constitutional: Negative.    HENT: Negative.    Eyes: Negative.    Respiratory: Negative.    Cardiovascular: Negative.  Negative for chest pain, palpitations and leg swelling.   Gastrointestinal: Negative.    Genitourinary: Negative.    Musculoskeletal: Negative.    Skin: Negative.    Neurological: Negative.    Psychiatric/Behavioral: Negative.        Objective:      Physical Exam   Constitutional: He is oriented to person, place, and time. He appears well-developed and well-nourished. No distress.   HENT:   Head: Normocephalic and atraumatic.   Right Ear: External ear normal.   Left Ear: External ear normal.   Nose: Nose normal.   Mouth/Throat: Oropharynx is clear and moist. No oropharyngeal exudate.   Eyes: Pupils are equal, round, and reactive to light. Conjunctivae and EOM are normal. Right eye exhibits no discharge. Left eye exhibits no discharge. No scleral icterus.   Neck: Normal range of motion. Neck supple. No JVD present. No tracheal deviation present. No thyromegaly present.   Cardiovascular: Normal rate, regular rhythm, normal heart sounds and intact distal pulses. Exam reveals no gallop and no friction rub.   No murmur heard.  Pulmonary/Chest: Effort normal and breath sounds normal. No stridor. No respiratory distress. He has no wheezes. He has no rales. He exhibits no tenderness.   Abdominal: Soft. Bowel sounds are normal. He exhibits no distension and no mass. There is no tenderness. There is no rebound and no guarding.   Musculoskeletal: Normal range of motion. He exhibits no edema or tenderness.   Lymphadenopathy:     He has no cervical adenopathy.   Neurological:  He is alert and oriented to person, place, and time. He has normal reflexes. He displays normal reflexes. No cranial nerve deficit. He exhibits normal muscle tone. Coordination normal.   Skin: Skin is warm and dry. No rash noted. He is not diaphoretic. No erythema. No pallor.   Psychiatric: He has a normal mood and affect. His behavior is normal. Judgment and thought content normal.   Nursing note and vitals reviewed.      Assessment:       1. Essential hypertension    2. Screening for prostate cancer        Plan:         Darrian was seen today for follow-up and hypertension.    Diagnoses and all orders for this visit:    Essential hypertension  -     Comprehensive metabolic panel; Future  -     Lipid panel; Future  -     Urinalysis; Future  -     TSH; Future  -     CBC auto differential; Future  -     losartan-hydrochlorothiazide 100-12.5 mg (HYZAAR) 100-12.5 mg Tab; Take 1 tablet by mouth once daily.    Screening for prostate cancer  -     PSA, Screening; Future    Continue monitoring blood pressure at home, low sodium diet.

## 2019-09-27 ENCOUNTER — LAB VISIT (OUTPATIENT)
Dept: LAB | Facility: HOSPITAL | Age: 64
End: 2019-09-27
Attending: FAMILY MEDICINE
Payer: COMMERCIAL

## 2019-09-27 DIAGNOSIS — Z12.5 SCREENING FOR PROSTATE CANCER: ICD-10-CM

## 2019-09-27 DIAGNOSIS — I10 ESSENTIAL HYPERTENSION: ICD-10-CM

## 2019-09-27 LAB
ALBUMIN SERPL BCP-MCNC: 4.4 G/DL (ref 3.5–5.2)
ALP SERPL-CCNC: 85 U/L (ref 55–135)
ALT SERPL W/O P-5'-P-CCNC: 40 U/L (ref 10–44)
ANION GAP SERPL CALC-SCNC: 12 MMOL/L (ref 8–16)
AST SERPL-CCNC: 29 U/L (ref 10–40)
BASOPHILS # BLD AUTO: 0.06 K/UL (ref 0–0.2)
BASOPHILS NFR BLD: 1 % (ref 0–1.9)
BILIRUB SERPL-MCNC: 0.6 MG/DL (ref 0.1–1)
BUN SERPL-MCNC: 11 MG/DL (ref 8–23)
CALCIUM SERPL-MCNC: 9.5 MG/DL (ref 8.7–10.5)
CHLORIDE SERPL-SCNC: 103 MMOL/L (ref 95–110)
CHOLEST SERPL-MCNC: 147 MG/DL (ref 120–199)
CHOLEST/HDLC SERPL: 3.7 {RATIO} (ref 2–5)
CO2 SERPL-SCNC: 25 MMOL/L (ref 23–29)
COMPLEXED PSA SERPL-MCNC: 0.17 NG/ML (ref 0–4)
CREAT SERPL-MCNC: 1 MG/DL (ref 0.5–1.4)
DIFFERENTIAL METHOD: NORMAL
EOSINOPHIL # BLD AUTO: 0.1 K/UL (ref 0–0.5)
EOSINOPHIL NFR BLD: 1.8 % (ref 0–8)
ERYTHROCYTE [DISTWIDTH] IN BLOOD BY AUTOMATED COUNT: 13.1 % (ref 11.5–14.5)
EST. GFR  (AFRICAN AMERICAN): >60 ML/MIN/1.73 M^2
EST. GFR  (NON AFRICAN AMERICAN): >60 ML/MIN/1.73 M^2
GLUCOSE SERPL-MCNC: 112 MG/DL (ref 70–110)
HCT VFR BLD AUTO: 52.3 % (ref 40–54)
HDLC SERPL-MCNC: 40 MG/DL (ref 40–75)
HDLC SERPL: 27.2 % (ref 20–50)
HGB BLD-MCNC: 17.4 G/DL (ref 14–18)
IMM GRANULOCYTES # BLD AUTO: 0.03 K/UL (ref 0–0.04)
IMM GRANULOCYTES NFR BLD AUTO: 0.5 % (ref 0–0.5)
LDLC SERPL CALC-MCNC: 72.6 MG/DL (ref 63–159)
LYMPHOCYTES # BLD AUTO: 2 K/UL (ref 1–4.8)
LYMPHOCYTES NFR BLD: 33.2 % (ref 18–48)
MCH RBC QN AUTO: 30.9 PG (ref 27–31)
MCHC RBC AUTO-ENTMCNC: 33.3 G/DL (ref 32–36)
MCV RBC AUTO: 93 FL (ref 82–98)
MONOCYTES # BLD AUTO: 0.9 K/UL (ref 0.3–1)
MONOCYTES NFR BLD: 15 % (ref 4–15)
NEUTROPHILS # BLD AUTO: 3 K/UL (ref 1.8–7.7)
NEUTROPHILS NFR BLD: 48.5 % (ref 38–73)
NONHDLC SERPL-MCNC: 107 MG/DL
NRBC BLD-RTO: 0 /100 WBC
PLATELET # BLD AUTO: 245 K/UL (ref 150–350)
PMV BLD AUTO: 11.3 FL (ref 9.2–12.9)
POTASSIUM SERPL-SCNC: 3.8 MMOL/L (ref 3.5–5.1)
PROT SERPL-MCNC: 7.9 G/DL (ref 6–8.4)
RBC # BLD AUTO: 5.63 M/UL (ref 4.6–6.2)
SODIUM SERPL-SCNC: 140 MMOL/L (ref 136–145)
TRIGL SERPL-MCNC: 172 MG/DL (ref 30–150)
TSH SERPL DL<=0.005 MIU/L-ACNC: 2.44 UIU/ML (ref 0.4–4)
WBC # BLD AUTO: 6.12 K/UL (ref 3.9–12.7)

## 2019-09-27 PROCEDURE — 80053 COMPREHEN METABOLIC PANEL: CPT

## 2019-09-27 PROCEDURE — 84153 ASSAY OF PSA TOTAL: CPT

## 2019-09-27 PROCEDURE — 85025 COMPLETE CBC W/AUTO DIFF WBC: CPT

## 2019-09-27 PROCEDURE — 36415 COLL VENOUS BLD VENIPUNCTURE: CPT | Mod: PO

## 2019-09-27 PROCEDURE — 80061 LIPID PANEL: CPT

## 2019-09-27 PROCEDURE — 84443 ASSAY THYROID STIM HORMONE: CPT

## 2019-10-09 DIAGNOSIS — E78.5 DYSLIPIDEMIA: ICD-10-CM

## 2019-10-09 RX ORDER — FENOFIBRATE 134 MG/1
CAPSULE ORAL
Qty: 30 CAPSULE | Refills: 11 | Status: SHIPPED | OUTPATIENT
Start: 2019-10-09 | End: 2020-11-09

## 2019-12-26 ENCOUNTER — OFFICE VISIT (OUTPATIENT)
Dept: FAMILY MEDICINE | Facility: CLINIC | Age: 64
End: 2019-12-26
Payer: COMMERCIAL

## 2019-12-26 VITALS
SYSTOLIC BLOOD PRESSURE: 112 MMHG | BODY MASS INDEX: 39.13 KG/M2 | OXYGEN SATURATION: 97 % | HEIGHT: 68 IN | DIASTOLIC BLOOD PRESSURE: 72 MMHG | WEIGHT: 258.19 LBS | HEART RATE: 92 BPM

## 2019-12-26 DIAGNOSIS — E78.5 DYSLIPIDEMIA: ICD-10-CM

## 2019-12-26 DIAGNOSIS — E66.01 SEVERE OBESITY (BMI 35.0-39.9) WITH COMORBIDITY: ICD-10-CM

## 2019-12-26 DIAGNOSIS — I10 ESSENTIAL HYPERTENSION: Primary | ICD-10-CM

## 2019-12-26 PROCEDURE — 99999 PR PBB SHADOW E&M-EST. PATIENT-LVL III: CPT | Mod: PBBFAC,,, | Performed by: FAMILY MEDICINE

## 2019-12-26 PROCEDURE — 99999 PR PBB SHADOW E&M-EST. PATIENT-LVL III: ICD-10-PCS | Mod: PBBFAC,,, | Performed by: FAMILY MEDICINE

## 2019-12-26 PROCEDURE — 99214 OFFICE O/P EST MOD 30 MIN: CPT | Mod: S$GLB,,, | Performed by: FAMILY MEDICINE

## 2019-12-26 PROCEDURE — 99214 PR OFFICE/OUTPT VISIT, EST, LEVL IV, 30-39 MIN: ICD-10-PCS | Mod: S$GLB,,, | Performed by: FAMILY MEDICINE

## 2019-12-26 NOTE — PROGRESS NOTES
Subjective:       Patient ID: Darrian Velez is a 64 y.o. male.    Chief Complaint: Follow-up (B/P) and Hypertension    64 years old male came to the clinic for blood pressure check.  Blood pressure currently stable.  No chest pain, palpitation, orthopnea or PND.  Patient with a BMI of 39 currently trying to lose weight.    Review of Systems   Constitutional: Negative.    HENT: Negative.    Eyes: Negative.    Respiratory: Negative.    Cardiovascular: Negative.  Negative for chest pain, palpitations and leg swelling.   Gastrointestinal: Negative.    Genitourinary: Negative.    Musculoskeletal: Negative.    Skin: Negative.    Neurological: Negative.    Psychiatric/Behavioral: Negative.        Objective:      Physical Exam   Constitutional: He is oriented to person, place, and time. He appears well-developed and well-nourished. No distress.   HENT:   Head: Normocephalic and atraumatic.   Right Ear: External ear normal.   Left Ear: External ear normal.   Nose: Nose normal.   Mouth/Throat: Oropharynx is clear and moist. No oropharyngeal exudate.   Eyes: Pupils are equal, round, and reactive to light. Conjunctivae and EOM are normal. Right eye exhibits no discharge. Left eye exhibits no discharge. No scleral icterus.   Neck: Normal range of motion. Neck supple. No JVD present. No tracheal deviation present. No thyromegaly present.   Cardiovascular: Normal rate, regular rhythm, normal heart sounds and intact distal pulses. Exam reveals no gallop and no friction rub.   No murmur heard.  Pulmonary/Chest: Effort normal and breath sounds normal. No stridor. No respiratory distress. He has no wheezes. He has no rales. He exhibits no tenderness.   Abdominal: Soft. Bowel sounds are normal. He exhibits no distension and no mass. There is no tenderness. There is no rebound and no guarding.   Musculoskeletal: Normal range of motion. He exhibits no edema or tenderness.   Lymphadenopathy:     He has no cervical adenopathy.    Neurological: He is alert and oriented to person, place, and time. He has normal reflexes. He displays normal reflexes. No cranial nerve deficit. He exhibits normal muscle tone. Coordination normal.   Skin: Skin is warm and dry. No rash noted. He is not diaphoretic. No erythema. No pallor.   Psychiatric: He has a normal mood and affect. His behavior is normal. Judgment and thought content normal.   Nursing note and vitals reviewed.      Assessment:       1. Essential hypertension    2. Severe obesity (BMI 35.0-39.9) with comorbidity    3. Dyslipidemia        Plan:         Darrian was seen today for follow-up and hypertension.    Diagnoses and all orders for this visit:    Essential hypertension  -     Comprehensive metabolic panel; Future  -     Lipid panel; Future  -     Urinalysis; Future  -     TSH; Future  -     CBC auto differential; Future    Severe obesity (BMI 35.0-39.9) with comorbidity  -     Lipid panel; Future  -     TSH; Future    Dyslipidemia  -     Comprehensive metabolic panel; Future  -     Lipid panel; Future  -     TSH; Future    Continue monitoring blood pressure at home, low sodium diet.   Diet and physical activity to promote weight loss.

## 2019-12-26 NOTE — PATIENT INSTRUCTIONS

## 2020-03-27 ENCOUNTER — LAB VISIT (OUTPATIENT)
Dept: LAB | Facility: HOSPITAL | Age: 65
End: 2020-03-27
Attending: FAMILY MEDICINE
Payer: COMMERCIAL

## 2020-03-27 DIAGNOSIS — I10 ESSENTIAL HYPERTENSION: ICD-10-CM

## 2020-03-27 DIAGNOSIS — E78.5 DYSLIPIDEMIA: ICD-10-CM

## 2020-03-27 DIAGNOSIS — E66.01 SEVERE OBESITY (BMI 35.0-39.9) WITH COMORBIDITY: ICD-10-CM

## 2020-03-27 LAB
ALBUMIN SERPL BCP-MCNC: 4.3 G/DL (ref 3.5–5.2)
ALP SERPL-CCNC: 67 U/L (ref 55–135)
ALT SERPL W/O P-5'-P-CCNC: 33 U/L (ref 10–44)
ANION GAP SERPL CALC-SCNC: 12 MMOL/L (ref 8–16)
AST SERPL-CCNC: 26 U/L (ref 10–40)
BASOPHILS # BLD AUTO: 0.04 K/UL (ref 0–0.2)
BASOPHILS NFR BLD: 0.6 % (ref 0–1.9)
BILIRUB SERPL-MCNC: 0.6 MG/DL (ref 0.1–1)
BUN SERPL-MCNC: 13 MG/DL (ref 8–23)
CALCIUM SERPL-MCNC: 10 MG/DL (ref 8.7–10.5)
CHLORIDE SERPL-SCNC: 104 MMOL/L (ref 95–110)
CHOLEST SERPL-MCNC: 176 MG/DL (ref 120–199)
CHOLEST/HDLC SERPL: 4.9 {RATIO} (ref 2–5)
CO2 SERPL-SCNC: 24 MMOL/L (ref 23–29)
CREAT SERPL-MCNC: 1.1 MG/DL (ref 0.5–1.4)
DIFFERENTIAL METHOD: NORMAL
EOSINOPHIL # BLD AUTO: 0.1 K/UL (ref 0–0.5)
EOSINOPHIL NFR BLD: 1.9 % (ref 0–8)
ERYTHROCYTE [DISTWIDTH] IN BLOOD BY AUTOMATED COUNT: 12.8 % (ref 11.5–14.5)
EST. GFR  (AFRICAN AMERICAN): >60 ML/MIN/1.73 M^2
EST. GFR  (NON AFRICAN AMERICAN): >60 ML/MIN/1.73 M^2
GLUCOSE SERPL-MCNC: 120 MG/DL (ref 70–110)
HCT VFR BLD AUTO: 48.8 % (ref 40–54)
HDLC SERPL-MCNC: 36 MG/DL (ref 40–75)
HDLC SERPL: 20.5 % (ref 20–50)
HGB BLD-MCNC: 15.7 G/DL (ref 14–18)
IMM GRANULOCYTES # BLD AUTO: 0.02 K/UL (ref 0–0.04)
IMM GRANULOCYTES NFR BLD AUTO: 0.3 % (ref 0–0.5)
LDLC SERPL CALC-MCNC: 68.2 MG/DL (ref 63–159)
LYMPHOCYTES # BLD AUTO: 2.2 K/UL (ref 1–4.8)
LYMPHOCYTES NFR BLD: 33.4 % (ref 18–48)
MCH RBC QN AUTO: 30.4 PG (ref 27–31)
MCHC RBC AUTO-ENTMCNC: 32.2 G/DL (ref 32–36)
MCV RBC AUTO: 94 FL (ref 82–98)
MONOCYTES # BLD AUTO: 0.8 K/UL (ref 0.3–1)
MONOCYTES NFR BLD: 12.4 % (ref 4–15)
NEUTROPHILS # BLD AUTO: 3.5 K/UL (ref 1.8–7.7)
NEUTROPHILS NFR BLD: 51.4 % (ref 38–73)
NONHDLC SERPL-MCNC: 140 MG/DL
NRBC BLD-RTO: 0 /100 WBC
PLATELET # BLD AUTO: 249 K/UL (ref 150–350)
PMV BLD AUTO: 11.9 FL (ref 9.2–12.9)
POTASSIUM SERPL-SCNC: 4 MMOL/L (ref 3.5–5.1)
PROT SERPL-MCNC: 7.8 G/DL (ref 6–8.4)
RBC # BLD AUTO: 5.17 M/UL (ref 4.6–6.2)
SODIUM SERPL-SCNC: 140 MMOL/L (ref 136–145)
TRIGL SERPL-MCNC: 359 MG/DL (ref 30–150)
TSH SERPL DL<=0.005 MIU/L-ACNC: 3.82 UIU/ML (ref 0.4–4)
WBC # BLD AUTO: 6.71 K/UL (ref 3.9–12.7)

## 2020-03-27 PROCEDURE — 80053 COMPREHEN METABOLIC PANEL: CPT

## 2020-03-27 PROCEDURE — 80061 LIPID PANEL: CPT

## 2020-03-27 PROCEDURE — 85025 COMPLETE CBC W/AUTO DIFF WBC: CPT

## 2020-03-27 PROCEDURE — 84443 ASSAY THYROID STIM HORMONE: CPT

## 2020-03-27 PROCEDURE — 36415 COLL VENOUS BLD VENIPUNCTURE: CPT | Mod: PO

## 2020-06-10 DIAGNOSIS — E78.5 DYSLIPIDEMIA: ICD-10-CM

## 2020-06-10 DIAGNOSIS — I10 ESSENTIAL HYPERTENSION: ICD-10-CM

## 2020-06-10 RX ORDER — LOSARTAN POTASSIUM AND HYDROCHLOROTHIAZIDE 12.5; 1 MG/1; MG/1
1 TABLET ORAL DAILY
Qty: 90 TABLET | Refills: 3 | Status: SHIPPED | OUTPATIENT
Start: 2020-06-10 | End: 2021-01-26 | Stop reason: SDUPTHER

## 2020-06-10 RX ORDER — ATORVASTATIN CALCIUM 10 MG/1
10 TABLET, FILM COATED ORAL DAILY
Qty: 30 TABLET | Refills: 0 | Status: SHIPPED | OUTPATIENT
Start: 2020-06-10 | End: 2020-08-03

## 2020-07-06 ENCOUNTER — OFFICE VISIT (OUTPATIENT)
Dept: FAMILY MEDICINE | Facility: CLINIC | Age: 65
End: 2020-07-06
Payer: COMMERCIAL

## 2020-07-06 DIAGNOSIS — Z12.11 SCREEN FOR COLON CANCER: ICD-10-CM

## 2020-07-06 DIAGNOSIS — I10 ESSENTIAL HYPERTENSION: Primary | ICD-10-CM

## 2020-07-06 DIAGNOSIS — R73.01 IMPAIRED FASTING BLOOD SUGAR: ICD-10-CM

## 2020-07-06 DIAGNOSIS — E78.5 DYSLIPIDEMIA: ICD-10-CM

## 2020-07-06 DIAGNOSIS — E66.01 SEVERE OBESITY (BMI 35.0-39.9) WITH COMORBIDITY: ICD-10-CM

## 2020-07-06 PROCEDURE — 99214 PR OFFICE/OUTPT VISIT, EST, LEVL IV, 30-39 MIN: ICD-10-PCS | Mod: 95,,, | Performed by: FAMILY MEDICINE

## 2020-07-06 PROCEDURE — 99214 OFFICE O/P EST MOD 30 MIN: CPT | Mod: 95,,, | Performed by: FAMILY MEDICINE

## 2020-07-06 NOTE — PROGRESS NOTES
Established Patient - Audio Only Telehealth Visit     The patient location is:  [place]  The chief complaint leading to consultation is:  Cholesterol  Visit type: Virtual visit with audio only (telephone)  Total time spent with patient: 12min       The reason for the audio only service rather than synchronous audio and video virtual visit was related to technical difficulties or patient preference/necessity.     Each patient to whom I provide medical services by telemedicine is:  (1) informed of the relationship between the physician and patient and the respective role of any other health care provider with respect to management of the patient; and (2) notified that they may decline to receive medical services by telemedicine and may withdraw from such care at any time. Patient verbally consented to receive this service via voice-only telephone call.       HPI:  65 years old male who was evaluated by telemedicine.  Blood pressure at home is stable.  No chest pain, palpitation, orthopnea or PND.  Patient with slightly elevated blood sugar .  No polyuria, polydipsia or polyphagia.  Patient due for his colonoscopy.  Patient is obese currently trying to lose weight.     Assessment and plan:  Diagnoses and all orders for this visit:    Essential hypertension  -     Comprehensive metabolic panel; Future  -     Lipid Panel; Future    Impaired fasting blood sugar  -     Comprehensive metabolic panel; Future  -     Lipid Panel; Future  -     Hemoglobin A1C; Future    Dyslipidemia  -     Comprehensive metabolic panel; Future  -     Lipid Panel; Future    Screen for colon cancer  -     Case request GI: COLONOSCOPY    Severe obesity (BMI 35.0-39.9) with comorbidity  -     Lipid Panel; Future    Continue monitoring blood pressure at home, low sodium diet.   Diet and physical activity to promote weight loss.                        This service was not originating from a related E/M service provided within the previous 7 days  nor will  to an E/M service or procedure within the next 24 hours or my soonest available appointment.  Prevailing standard of care was able to be met in this audio-only visit.

## 2020-07-08 DIAGNOSIS — Z01.812 PRE-PROCEDURE LAB EXAM: ICD-10-CM

## 2020-07-08 RX ORDER — SODIUM, POTASSIUM,MAG SULFATES 17.5-3.13G
1 SOLUTION, RECONSTITUTED, ORAL ORAL DAILY
Qty: 1 KIT | Refills: 0 | Status: SHIPPED | OUTPATIENT
Start: 2020-07-08 | End: 2020-07-10

## 2020-07-08 NOTE — TELEPHONE ENCOUNTER
Endoscopy Scheduling Questionnaire:     1. Have you been admitted overnight to the hospital in the past 3 months? NO  2. Have you had a cardiac stent placed in the past 12 months? NO  3. Have you had a stroke or heart attack in the past 6 months? NO  4. Have you had any chest pain in the past 3 months?       If so, have you been evaluated by your PCP or Cardiologist? NO  5. Do you take any blood thinners?NO  6. Have you been diagnosed with Diverticulitis within the past 3 months? NO  7. Are you on dialysis or have Kidney Disease?NO/NO   8. Are you diabetic?  Do you have an insulin pump? NO  9. Do you have any other health issues that you feel might limit your ability to safely have the procedure and/or sedation? NO  10. Is the patient over 79 yo? NO     If yes, has the patient been seen by their PCP or GI in the last 6 months? PCP  11. Family History of Colon Cancer? NO         If yes, relationship/age?  12. Previous Colonoscopy Procedure?YES         If yes, when/where  13. History of Colon Cancer? NO  14.  History of Colon Polyps? YES  15. Have you had a FIT Test in the last year? NO                 -I have reviewed the patient's medications and allergies.       is not on high risk medication,   A Medication /Cardiac Clearance request  has been sent to N/A **  -I have verified the pharmacy information. The prep being used is SUPREP. The patient's prep instructions were sent by MAILED.

## 2020-07-10 ENCOUNTER — LAB VISIT (OUTPATIENT)
Dept: LAB | Facility: HOSPITAL | Age: 65
End: 2020-07-10
Attending: FAMILY MEDICINE
Payer: COMMERCIAL

## 2020-07-10 DIAGNOSIS — E78.5 DYSLIPIDEMIA: ICD-10-CM

## 2020-07-10 DIAGNOSIS — E66.01 SEVERE OBESITY (BMI 35.0-39.9) WITH COMORBIDITY: ICD-10-CM

## 2020-07-10 DIAGNOSIS — I10 ESSENTIAL HYPERTENSION: ICD-10-CM

## 2020-07-10 DIAGNOSIS — R73.01 IMPAIRED FASTING BLOOD SUGAR: ICD-10-CM

## 2020-07-10 LAB
ALBUMIN SERPL BCP-MCNC: 4.2 G/DL (ref 3.5–5.2)
ALP SERPL-CCNC: 63 U/L (ref 55–135)
ALT SERPL W/O P-5'-P-CCNC: 44 U/L (ref 10–44)
ANION GAP SERPL CALC-SCNC: 9 MMOL/L (ref 8–16)
AST SERPL-CCNC: 31 U/L (ref 10–40)
BILIRUB SERPL-MCNC: 0.5 MG/DL (ref 0.1–1)
BUN SERPL-MCNC: 13 MG/DL (ref 8–23)
CALCIUM SERPL-MCNC: 9.3 MG/DL (ref 8.7–10.5)
CHLORIDE SERPL-SCNC: 107 MMOL/L (ref 95–110)
CHOLEST SERPL-MCNC: 151 MG/DL (ref 120–199)
CHOLEST/HDLC SERPL: 3.6 {RATIO} (ref 2–5)
CO2 SERPL-SCNC: 25 MMOL/L (ref 23–29)
CREAT SERPL-MCNC: 1 MG/DL (ref 0.5–1.4)
EST. GFR  (AFRICAN AMERICAN): >60 ML/MIN/1.73 M^2
EST. GFR  (NON AFRICAN AMERICAN): >60 ML/MIN/1.73 M^2
ESTIMATED AVG GLUCOSE: 111 MG/DL (ref 68–131)
GLUCOSE SERPL-MCNC: 105 MG/DL (ref 70–110)
HBA1C MFR BLD HPLC: 5.5 % (ref 4–5.6)
HDLC SERPL-MCNC: 42 MG/DL (ref 40–75)
HDLC SERPL: 27.8 % (ref 20–50)
LDLC SERPL CALC-MCNC: 74.6 MG/DL (ref 63–159)
NONHDLC SERPL-MCNC: 109 MG/DL
POTASSIUM SERPL-SCNC: 3.9 MMOL/L (ref 3.5–5.1)
PROT SERPL-MCNC: 7.4 G/DL (ref 6–8.4)
SODIUM SERPL-SCNC: 141 MMOL/L (ref 136–145)
TRIGL SERPL-MCNC: 172 MG/DL (ref 30–150)

## 2020-07-10 PROCEDURE — 80061 LIPID PANEL: CPT

## 2020-07-10 PROCEDURE — 83036 HEMOGLOBIN GLYCOSYLATED A1C: CPT

## 2020-07-10 PROCEDURE — 80053 COMPREHEN METABOLIC PANEL: CPT

## 2020-07-10 PROCEDURE — 36415 COLL VENOUS BLD VENIPUNCTURE: CPT | Mod: PO

## 2020-07-13 DIAGNOSIS — Z86.010 HX OF COLONIC POLYPS: Primary | ICD-10-CM

## 2020-07-13 NOTE — TELEPHONE ENCOUNTER
----- Message from Sandra Chavez sent at 7/13/2020  4:15 PM CDT -----  [facility] called regarding the e- script for the following medication:  -SUPREP BOWEL Prep Kit       Pt wants to change this medication to something else it is not covered with her insurance.   Contact Info

## 2020-07-14 RX ORDER — POLYETHYLENE GLYCOL 3350, SODIUM SULFATE ANHYDROUS, SODIUM BICARBONATE, SODIUM CHLORIDE, POTASSIUM CHLORIDE 236; 22.74; 6.74; 5.86; 2.97 G/4L; G/4L; G/4L; G/4L; G/4L
4 POWDER, FOR SOLUTION ORAL ONCE
Qty: 4000 ML | Refills: 0 | Status: SHIPPED | OUTPATIENT
Start: 2020-07-14 | End: 2020-07-14

## 2020-07-20 ENCOUNTER — TELEPHONE (OUTPATIENT)
Dept: ENDOSCOPY | Facility: HOSPITAL | Age: 65
End: 2020-07-20

## 2020-07-20 ENCOUNTER — LAB VISIT (OUTPATIENT)
Dept: FAMILY MEDICINE | Facility: CLINIC | Age: 65
End: 2020-07-20
Payer: COMMERCIAL

## 2020-07-20 DIAGNOSIS — Z01.812 PRE-PROCEDURE LAB EXAM: ICD-10-CM

## 2020-07-20 PROCEDURE — U0003 INFECTIOUS AGENT DETECTION BY NUCLEIC ACID (DNA OR RNA); SEVERE ACUTE RESPIRATORY SYNDROME CORONAVIRUS 2 (SARS-COV-2) (CORONAVIRUS DISEASE [COVID-19]), AMPLIFIED PROBE TECHNIQUE, MAKING USE OF HIGH THROUGHPUT TECHNOLOGIES AS DESCRIBED BY CMS-2020-01-R: HCPCS

## 2020-07-20 NOTE — TELEPHONE ENCOUNTER
Spoke with patient about arrival time @ 1230 on 7/22/2020.     Prep instructions reviewed: the day before the procedure, follow a clear liquid diet all day, then start the first 1/2 of prep at 5pm and take 2nd 1/2 of prep @ 0730 on 7/22/2020.  Pt must be completely NPO when prep completed @ 0930 on 7/22/2020.              Medications: Do not take Insulin or oral diabetic medications the day of the procedure.  Take as prescribed: heart, seizure and blood pressure medication in the morning with a sip of water (less than an ounce).  Take any breathing medications and bring inhalers to hospital with you Leave all valuables and jewelry at home.     Wear comfortable clothes to procedure to change into hospital gown You cannot drive for 24 hours after your procedure because you will receive sedation for your procedure to make you comfortable.  A ride must be provided at discharge.

## 2020-07-21 LAB — SARS-COV-2 RNA RESP QL NAA+PROBE: NOT DETECTED

## 2020-07-22 ENCOUNTER — ANESTHESIA EVENT (OUTPATIENT)
Dept: ENDOSCOPY | Facility: HOSPITAL | Age: 65
End: 2020-07-22
Payer: COMMERCIAL

## 2020-07-22 ENCOUNTER — ANESTHESIA (OUTPATIENT)
Dept: ENDOSCOPY | Facility: HOSPITAL | Age: 65
End: 2020-07-22
Payer: COMMERCIAL

## 2020-07-22 ENCOUNTER — HOSPITAL ENCOUNTER (OUTPATIENT)
Facility: HOSPITAL | Age: 65
Discharge: HOME | End: 2020-07-22
Attending: INTERNAL MEDICINE | Admitting: INTERNAL MEDICINE
Payer: COMMERCIAL

## 2020-07-22 VITALS
RESPIRATION RATE: 20 BRPM | HEART RATE: 72 BPM | DIASTOLIC BLOOD PRESSURE: 69 MMHG | BODY MASS INDEX: 39.24 KG/M2 | OXYGEN SATURATION: 97 % | TEMPERATURE: 98 F | WEIGHT: 250 LBS | SYSTOLIC BLOOD PRESSURE: 132 MMHG | HEIGHT: 67 IN

## 2020-07-22 DIAGNOSIS — Z86.010 HX OF COLONIC POLYPS: Primary | ICD-10-CM

## 2020-07-22 DIAGNOSIS — Z12.11 COLON CANCER SCREENING: ICD-10-CM

## 2020-07-22 PROCEDURE — 37000008 HC ANESTHESIA 1ST 15 MINUTES: Performed by: INTERNAL MEDICINE

## 2020-07-22 PROCEDURE — 88305 TISSUE EXAM BY PATHOLOGIST: CPT | Performed by: PATHOLOGY

## 2020-07-22 PROCEDURE — 99203 OFFICE O/P NEW LOW 30 MIN: CPT | Mod: 25,,, | Performed by: INTERNAL MEDICINE

## 2020-07-22 PROCEDURE — 25000003 PHARM REV CODE 250: Performed by: NURSE ANESTHETIST, CERTIFIED REGISTERED

## 2020-07-22 PROCEDURE — 37000009 HC ANESTHESIA EA ADD 15 MINS: Performed by: INTERNAL MEDICINE

## 2020-07-22 PROCEDURE — 45385 COLONOSCOPY W/LESION REMOVAL: CPT | Performed by: INTERNAL MEDICINE

## 2020-07-22 PROCEDURE — 88305 TISSUE EXAM BY PATHOLOGIST: ICD-10-PCS | Mod: 26,,, | Performed by: PATHOLOGY

## 2020-07-22 PROCEDURE — 27201089 HC SNARE, DISP (ANY): Performed by: INTERNAL MEDICINE

## 2020-07-22 PROCEDURE — 45385 COLONOSCOPY W/LESION REMOVAL: CPT | Mod: 33,,, | Performed by: INTERNAL MEDICINE

## 2020-07-22 PROCEDURE — 99203 PR OFFICE/OUTPT VISIT, NEW, LEVL III, 30-44 MIN: ICD-10-PCS | Mod: 25,,, | Performed by: INTERNAL MEDICINE

## 2020-07-22 PROCEDURE — 88305 TISSUE EXAM BY PATHOLOGIST: CPT | Mod: 26,,, | Performed by: PATHOLOGY

## 2020-07-22 PROCEDURE — 25000003 PHARM REV CODE 250: Performed by: INTERNAL MEDICINE

## 2020-07-22 PROCEDURE — 45385 PR COLONOSCOPY,REMV LESN,SNARE: ICD-10-PCS | Mod: 33,,, | Performed by: INTERNAL MEDICINE

## 2020-07-22 PROCEDURE — 63600175 PHARM REV CODE 636 W HCPCS: Performed by: NURSE ANESTHETIST, CERTIFIED REGISTERED

## 2020-07-22 RX ORDER — SODIUM CHLORIDE 0.9 % (FLUSH) 0.9 %
10 SYRINGE (ML) INJECTION
Status: DISCONTINUED | OUTPATIENT
Start: 2020-07-22 | End: 2020-07-22 | Stop reason: HOSPADM

## 2020-07-22 RX ORDER — PROPOFOL 10 MG/ML
VIAL (ML) INTRAVENOUS CONTINUOUS PRN
Status: DISCONTINUED | OUTPATIENT
Start: 2020-07-22 | End: 2020-07-22

## 2020-07-22 RX ORDER — SODIUM CHLORIDE 9 MG/ML
INJECTION, SOLUTION INTRAVENOUS CONTINUOUS
Status: DISCONTINUED | OUTPATIENT
Start: 2020-07-22 | End: 2020-07-22 | Stop reason: HOSPADM

## 2020-07-22 RX ORDER — LIDOCAINE HCL/PF 100 MG/5ML
SYRINGE (ML) INTRAVENOUS
Status: DISCONTINUED | OUTPATIENT
Start: 2020-07-22 | End: 2020-07-22

## 2020-07-22 RX ORDER — PROPOFOL 10 MG/ML
VIAL (ML) INTRAVENOUS
Status: DISCONTINUED | OUTPATIENT
Start: 2020-07-22 | End: 2020-07-22

## 2020-07-22 RX ADMIN — PROPOFOL 10 MG: 10 INJECTION, EMULSION INTRAVENOUS at 01:07

## 2020-07-22 RX ADMIN — SODIUM CHLORIDE: 0.9 INJECTION, SOLUTION INTRAVENOUS at 12:07

## 2020-07-22 RX ADMIN — PROPOFOL 80 MG: 10 INJECTION, EMULSION INTRAVENOUS at 01:07

## 2020-07-22 RX ADMIN — LIDOCAINE HYDROCHLORIDE 100 MG: 20 INJECTION, SOLUTION INTRAVENOUS at 01:07

## 2020-07-22 RX ADMIN — PROPOFOL 150 MCG/KG/MIN: 10 INJECTION, EMULSION INTRAVENOUS at 01:07

## 2020-07-22 NOTE — H&P
Short Stay Endoscopy History and Physical    PCP - Balwinder Alvarado MD    Procedure - Colonscopy  ASA - II  Mallampati - per anesthesia  History of Anesthesia problems - no  Family history Anesthesia problems - no     HPI:  This is a 65 y.o. male here for evaluation of : Surveillance of colon polyp.    Pt here today for surveillance colonoscopy. Patient underwent colonoscopy  5 years, at which time 2 small polyps were removed. Since patient denies lower GI complaints of diarrhea, constipation, overt blood in stool, or change in bowel habits.  Denies unexplained weight loss.      ROS:  Constitutional: No fevers, chills, No weight loss  ENT: No allergies  CV: No chest pain  Pulm: No shortness of breath  GI: see HPI  Derm: No rash    Medical History:  has a past medical history of ALLERGIC RHINITIS, colonic polyps (4/05/2012), Hyperlipidemia, Hypertension, MRSA infection, and Personal history of kidney stones.    Surgical History:  has a past surgical history that includes Vasectomy and Hernia repair.    Family History: family history includes Breast cancer in his mother; Colon cancer in his maternal grandmother; Coronary artery disease in his father; Heart disease in his father; Hypertension in his mother; Testicular cancer in his brother.. Otherwise no colon cancer, inflammatory bowel disease, or GI malignancies.    Social History:  reports that he has never smoked. He has never used smokeless tobacco. He reports current alcohol use. He reports that he does not use drugs.    Review of patient's allergies indicates:   Allergen Reactions    No known allergies        Medications:   Medications Prior to Admission   Medication Sig Dispense Refill Last Dose    ASPIRIN (ASPIR-81 ORAL)        atorvastatin (LIPITOR) 10 MG tablet Take 1 tablet (10 mg total) by mouth once daily. 30 tablet 0     fenofibrate micronized (LOFIBRA) 134 MG Cap TAKE 1 CAPSULE BY MOUTH IN THE EVENING 30 capsule 11     garlic 500 mg Cap Take  1 capsule by mouth once daily.       loratadine (CLARITIN) 10 mg tablet Take 1 tablet (10 mg total) by mouth once daily.  0     losartan-hydrochlorothiazide 100-12.5 mg (HYZAAR) 100-12.5 mg Tab Take 1 tablet by mouth once daily. 90 tablet 3     multivitamin (MULTIVITAMIN) per tablet Take 1 tablet by mouth once daily.         OMEGA-3 FATTY ACIDS/FISH OIL (OMEGA 3 FISH OIL ORAL) Take by mouth. 1 Capsule By mouth Twice a day             Objective Findings:    Vital Signs: see nursing notes  Physical Exam:  General Appearance: Well appearing in no acute distress  Eyes:    No scleral icterus  ENT: Neck supple  Lungs: CTA anteriorly  Heart:  S1, S2 normal, no murmurs heard  Abdomen: Soft, non tender, non distended with positive bowel sounds. No hepatosplenomegaly, ascites, or mass  Extremities: no edema  Skin: No rash      Labs:  Lab Results   Component Value Date    WBC 6.71 03/27/2020    HGB 15.7 03/27/2020    HCT 48.8 03/27/2020     03/27/2020    CHOL 151 07/10/2020    TRIG 172 (H) 07/10/2020    HDL 42 07/10/2020    ALT 44 07/10/2020    AST 31 07/10/2020     07/10/2020    K 3.9 07/10/2020     07/10/2020    CREATININE 1.0 07/10/2020    BUN 13 07/10/2020    CO2 25 07/10/2020    TSH 3.819 03/27/2020    PSA 0.17 09/27/2019    HGBA1C 5.5 07/10/2020       I have explained the risks and benefits of endoscopy procedures to the patient including but not limited to bleeding, perforation, infection, and death.    Donato Shetty MD

## 2020-07-22 NOTE — ANESTHESIA PREPROCEDURE EVALUATION
07/22/2020     Darrian Velez is a 65 y.o., male with history of colonic polyps under MAC      Past Medical History:   Diagnosis Date    ALLERGIC RHINITIS     Hx of colonic polyps 4/05/2012    Tubular adenoma    Hyperlipidemia     Hypertension     MRSA infection     left medial thigh abscess & cellulitis    Personal history of kidney stones          Past Surgical History:   Procedure Laterality Date    HERNIA REPAIR      umbilical hernia repair with mesh    VASECTOMY         ECG 2013  Normal sinus rhythm  Nonspecific ST and T wave abnormality       Pre-op Assessment    I have reviewed the Patient Summary Reports.          Review of Systems  Anesthesia Hx:  No problems with previous Anesthesia Denies Hx of Anesthetic complications  History of prior surgery of interest to airway management or planning: Denies Family Hx of Anesthesia complications.   Denies Personal Hx of Anesthesia complications.   EENT/Dental:   chronic allergic rhinitis   Cardiovascular:   Exercise tolerance: good Hypertension hyperlipidemia    Renal/:   renal calculi    Hepatic/GI:   Hx colonic polyps       Physical Exam  General:  Well nourished    Airway/Jaw/Neck:  Airway Findings: Mouth Opening: Normal General Airway Assessment: Adult  Mallampati: II  Improves to II with phonation.  TM Distance: 4 - 6 cm        Eyes/Ears/Nose:  EYES/EARS/NOSE FINDINGS: Normal   Dental:  Dental Findings: In tact   Chest/Lungs:  Chest/Lungs Findings: Clear to auscultation, Normal Respiratory Rate     Heart/Vascular:  Heart Findings: Rate: Normal  Rhythm: Regular Rhythm  Sounds: Normal  Heart murmur: negative    Abdomen:  Abdomen Findings:  Normal, Soft       Mental Status:  Mental Status Findings:  Cooperative, Alert and Oriented         Anesthesia Plan  Type of Anesthesia, risks & benefits discussed:  Anesthesia Type:  MAC  Patient's  Preference:   Intra-op Monitoring Plan:   Intra-op Monitoring Plan Comments:   Post Op Pain Control Plan:   Post Op Pain Control Plan Comments:   Induction:   IV  Beta Blocker:         Informed Consent: Patient understands risks and agrees with Anesthesia plan.  Questions answered. Anesthesia consent signed with patient.  ASA Score: 3     Day of Surgery Review of History & Physical: I have interviewed and examined the patient. I have reviewed the patient's H&P dated:  There are no significant changes.          Ready For Surgery From Anesthesia Perspective.     Lab Results   Component Value Date    WBC 6.71 03/27/2020    HGB 15.7 03/27/2020    HCT 48.8 03/27/2020     03/27/2020    CHOL 151 07/10/2020    TRIG 172 (H) 07/10/2020    HDL 42 07/10/2020    ALT 44 07/10/2020    AST 31 07/10/2020     07/10/2020    K 3.9 07/10/2020     07/10/2020    CREATININE 1.0 07/10/2020    BUN 13 07/10/2020    CO2 25 07/10/2020    TSH 3.819 03/27/2020    PSA 0.17 09/27/2019    HGBA1C 5.5 07/10/2020

## 2020-07-22 NOTE — TRANSFER OF CARE
"Anesthesia Transfer of Care Note    Patient: Darrian Velez    Procedure(s) Performed: Procedure(s) (LRB):  COLONOSCOPY (N/A)    Patient location: GI    Anesthesia Type: MAC    Transport from OR: Transported from OR on room air with adequate spontaneous ventilation    Post pain: adequate analgesia    Post assessment: no apparent anesthetic complications and tolerated procedure well    Post vital signs: stable    Level of consciousness: awake, alert and oriented    Nausea/Vomiting: no nausea/vomiting    Complications: none    Transfer of care protocol was followed      Last vitals:   Visit Vitals  /77 (BP Location: Left arm, Patient Position: Lying)   Pulse 87   Temp 37.1 °C (98.8 °F) (Temporal)   Resp 18   Ht 5' 7" (1.702 m)   Wt 113.4 kg (250 lb)   SpO2 96%   BMI 39.16 kg/m²     "

## 2020-07-22 NOTE — [employer] PATIENT INSTRUCTIONS
Discharge Summary/Instructions after an Endoscopic Procedure  Patient Name: Darrian Velez  Patient MRN: [id number]  Patient YOB: 1955 Wednesday, July 22, 2020  Donato Shetty MD  Your health is very important to us during the Covid Crisis. Following your   procedure today, you will receive a daily text for 2 weeks asking about   signs or symptoms of Covid 19.  Please respond to this text when you   receive it so we can follow up and keep you as safe as possible.   RESTRICTIONS:  During your procedure today, you received medications for sedation.  These   medications may affect your judgment, balance and coordination.  Therefore,   for 24 hours, you have the following restrictions:   - DO NOT drive a car, operate machinery, make legal/financial decisions,   sign important papers or drink alcohol.    ACTIVITY:  Today: no heavy lifting, straining or running due to procedural   sedation/anesthesia.  The following day: return to full activity including work.  DIET:  Eat and drink normally unless instructed otherwise.     TREATMENT FOR COMMON SIDE EFFECTS:  - Mild abdominal pain, nausea, belching, bloating or excessive gas:  rest,   eat lightly and use a heating pad.  - Sore Throat: treat with throat lozenges and/or gargle with warm salt   water.  - Because air was used during the procedure, expelling large amounts of air   from your rectum or belching is normal.  - If a bowel prep was taken, you may not have a bowel movement for 1-3 days.    This is normal.  SYMPTOMS TO WATCH FOR AND REPORT TO YOUR PHYSICIAN:  1. Abdominal pain or bloating, other than gas cramps.  2. Chest pain.  3. Back pain.  4. Signs of infection such as: chills or fever occurring within 24 hours   after the procedure.  5. Rectal bleeding, which would show as bright red, maroon, or black stools.   (A tablespoon of blood from the rectum is not serious, especially if   hemorrhoids are present.)  6. Vomiting.  7. Weakness or  dizziness.  GO DIRECTLY TO THE NEAREST EMERGENCY ROOM IF YOU HAVE ANY OF THE FOLLOWING:      Difficulty breathing              Chills and/or fever over 101 F   Persistent vomiting and/or vomiting blood   Severe abdominal pain   Severe chest pain   Black, tarry stools   Bleeding- more than one tablespoon   Any other symptom or condition that you feel may need urgent attention  Your doctor recommends these additional instructions:  If any biopsies were taken, your doctors clinic will contact you in 1 to 2   weeks with any results.  - Discharge patient to home.   - High fiber diet.   - Continue present medications.   - Await pathology results.   - Repeat colonoscopy in 5 years for surveillance.   - Patient has a contact number available for emergencies.  The signs and   symptoms of potential delayed complications were discussed with the   patient.  Return to normal activities tomorrow.  Written discharge   instructions were provided to the patient.  For questions, problems or results please call your physician - Donato Shetty MD.  EMERGENCY PHONE NUMBER: [phone],  LAB RESULTS: [phone]  IF A COMPLICATION OR EMERGENCY SITUATION ARISES AND YOU ARE UNABLE TO REACH   YOUR PHYSICIAN - GO DIRECTLY TO THE EMERGENCY ROOM.  Donato Shetty MD  7/22/2020 1:52:42 PM  This report has been verified and signed electronically.  [employer]

## 2020-07-22 NOTE — ANESTHESIA POSTPROCEDURE EVALUATION
Anesthesia Post Evaluation    Patient: Darrian Velez    Procedure(s) Performed: Procedure(s) (LRB):  COLONOSCOPY (N/A)    Final Anesthesia Type: MAC    Patient location during evaluation: GI PACU  Patient participation: Yes- Able to Participate  Level of consciousness: awake and alert and oriented  Post-procedure vital signs: reviewed and stable  Pain management: adequate  Airway patency: patent  EMILY mitigation strategies: Multimodal analgesia  PONV status at discharge: No PONV  Anesthetic complications: no      Cardiovascular status: stable, hemodynamically stable and blood pressure returned to baseline  Respiratory status: room air, spontaneous ventilation and unassisted  Hydration status: euvolemic  Follow-up not needed.          Vitals Value Taken Time   BP  07/22/20 1348   Temp  07/22/20 1348   Pulse  07/22/20 1348   Resp  07/22/20 1348   SpO2  07/22/20 1348         No case tracking events are documented in the log.      Pain/Kim Score: No data recorded

## 2020-07-23 LAB
FINAL PATHOLOGIC DIAGNOSIS: NORMAL
GROSS: NORMAL

## 2020-07-29 ENCOUNTER — TELEPHONE (OUTPATIENT)
Dept: GASTROENTEROLOGY | Facility: CLINIC | Age: 65
End: 2020-07-29

## 2020-07-29 NOTE — TELEPHONE ENCOUNTER
Benign polyp. Repeat colonoscopy in 5 years.   Result given patient verbalized understanding. Recall placed

## 2020-07-29 NOTE — TELEPHONE ENCOUNTER
----- Message from Donato Shetty MD sent at 7/28/2020  8:42 AM CDT -----  Benign polyp. Repeat colonoscopy in 5 years.

## 2021-01-26 ENCOUNTER — LAB VISIT (OUTPATIENT)
Dept: LAB | Facility: HOSPITAL | Age: 66
End: 2021-01-26
Attending: FAMILY MEDICINE
Payer: COMMERCIAL

## 2021-01-26 ENCOUNTER — OFFICE VISIT (OUTPATIENT)
Dept: FAMILY MEDICINE | Facility: CLINIC | Age: 66
End: 2021-01-26
Payer: COMMERCIAL

## 2021-01-26 VITALS
BODY MASS INDEX: 39.9 KG/M2 | WEIGHT: 254.19 LBS | OXYGEN SATURATION: 98 % | SYSTOLIC BLOOD PRESSURE: 130 MMHG | HEIGHT: 67 IN | HEART RATE: 94 BPM | TEMPERATURE: 97 F | DIASTOLIC BLOOD PRESSURE: 70 MMHG

## 2021-01-26 DIAGNOSIS — N20.0 RENAL STONES: ICD-10-CM

## 2021-01-26 DIAGNOSIS — E66.01 SEVERE OBESITY (BMI 35.0-39.9) WITH COMORBIDITY: ICD-10-CM

## 2021-01-26 DIAGNOSIS — R30.0 DYSURIA: ICD-10-CM

## 2021-01-26 DIAGNOSIS — E78.5 DYSLIPIDEMIA: ICD-10-CM

## 2021-01-26 DIAGNOSIS — N20.0 RENAL STONES: Primary | ICD-10-CM

## 2021-01-26 DIAGNOSIS — I10 ESSENTIAL HYPERTENSION: ICD-10-CM

## 2021-01-26 LAB
BASOPHILS # BLD AUTO: 0.05 K/UL (ref 0–0.2)
BASOPHILS NFR BLD: 0.7 % (ref 0–1.9)
DIFFERENTIAL METHOD: ABNORMAL
EOSINOPHIL # BLD AUTO: 0.1 K/UL (ref 0–0.5)
EOSINOPHIL NFR BLD: 1.6 % (ref 0–8)
ERYTHROCYTE [DISTWIDTH] IN BLOOD BY AUTOMATED COUNT: 12.8 % (ref 11.5–14.5)
HCT VFR BLD AUTO: 48.8 % (ref 40–54)
HGB BLD-MCNC: 16.1 G/DL (ref 14–18)
IMM GRANULOCYTES # BLD AUTO: 0.03 K/UL (ref 0–0.04)
IMM GRANULOCYTES NFR BLD AUTO: 0.4 % (ref 0–0.5)
LYMPHOCYTES # BLD AUTO: 2.2 K/UL (ref 1–4.8)
LYMPHOCYTES NFR BLD: 31.8 % (ref 18–48)
MCH RBC QN AUTO: 30.3 PG (ref 27–31)
MCHC RBC AUTO-ENTMCNC: 33 G/DL (ref 32–36)
MCV RBC AUTO: 92 FL (ref 82–98)
MONOCYTES # BLD AUTO: 1.1 K/UL (ref 0.3–1)
MONOCYTES NFR BLD: 15.8 % (ref 4–15)
NEUTROPHILS # BLD AUTO: 3.4 K/UL (ref 1.8–7.7)
NEUTROPHILS NFR BLD: 49.7 % (ref 38–73)
NRBC BLD-RTO: 0 /100 WBC
PLATELET # BLD AUTO: 254 K/UL (ref 150–350)
PMV BLD AUTO: 11.9 FL (ref 9.2–12.9)
RBC # BLD AUTO: 5.31 M/UL (ref 4.6–6.2)
WBC # BLD AUTO: 6.91 K/UL (ref 3.9–12.7)

## 2021-01-26 PROCEDURE — 99214 OFFICE O/P EST MOD 30 MIN: CPT | Mod: S$GLB,,, | Performed by: FAMILY MEDICINE

## 2021-01-26 PROCEDURE — 84153 ASSAY OF PSA TOTAL: CPT

## 2021-01-26 PROCEDURE — 99999 PR PBB SHADOW E&M-EST. PATIENT-LVL III: ICD-10-PCS | Mod: PBBFAC,,, | Performed by: FAMILY MEDICINE

## 2021-01-26 PROCEDURE — 85025 COMPLETE CBC W/AUTO DIFF WBC: CPT

## 2021-01-26 PROCEDURE — 36415 COLL VENOUS BLD VENIPUNCTURE: CPT | Mod: PO

## 2021-01-26 PROCEDURE — 99999 PR PBB SHADOW E&M-EST. PATIENT-LVL III: CPT | Mod: PBBFAC,,, | Performed by: FAMILY MEDICINE

## 2021-01-26 PROCEDURE — 80048 BASIC METABOLIC PNL TOTAL CA: CPT

## 2021-01-26 PROCEDURE — 99214 PR OFFICE/OUTPT VISIT, EST, LEVL IV, 30-39 MIN: ICD-10-PCS | Mod: S$GLB,,, | Performed by: FAMILY MEDICINE

## 2021-01-26 RX ORDER — DOXYCYCLINE 100 MG/1
100 CAPSULE ORAL 2 TIMES DAILY
Qty: 20 CAPSULE | Refills: 0 | Status: SHIPPED | OUTPATIENT
Start: 2021-01-26 | End: 2021-02-05

## 2021-01-26 RX ORDER — LOSARTAN POTASSIUM AND HYDROCHLOROTHIAZIDE 12.5; 1 MG/1; MG/1
1 TABLET ORAL DAILY
Qty: 90 TABLET | Refills: 3 | Status: SHIPPED | OUTPATIENT
Start: 2021-01-26 | End: 2022-01-12

## 2021-01-26 RX ORDER — FENOFIBRATE 134 MG/1
134 CAPSULE ORAL
Qty: 90 CAPSULE | Refills: 3 | Status: SHIPPED | OUTPATIENT
Start: 2021-01-26 | End: 2022-01-12

## 2021-01-26 RX ORDER — ATORVASTATIN CALCIUM 10 MG/1
10 TABLET, FILM COATED ORAL NIGHTLY
Qty: 90 TABLET | Refills: 3 | Status: SHIPPED | OUTPATIENT
Start: 2021-01-26 | End: 2022-01-12

## 2021-01-27 LAB
ANION GAP SERPL CALC-SCNC: 14 MMOL/L (ref 8–16)
BUN SERPL-MCNC: 15 MG/DL (ref 8–23)
CALCIUM SERPL-MCNC: 9.4 MG/DL (ref 8.7–10.5)
CHLORIDE SERPL-SCNC: 103 MMOL/L (ref 95–110)
CO2 SERPL-SCNC: 22 MMOL/L (ref 23–29)
COMPLEXED PSA SERPL-MCNC: 1.2 NG/ML (ref 0–4)
CREAT SERPL-MCNC: 1 MG/DL (ref 0.5–1.4)
EST. GFR  (AFRICAN AMERICAN): >60 ML/MIN/1.73 M^2
EST. GFR  (NON AFRICAN AMERICAN): >60 ML/MIN/1.73 M^2
GLUCOSE SERPL-MCNC: 94 MG/DL (ref 70–110)
POTASSIUM SERPL-SCNC: 3.3 MMOL/L (ref 3.5–5.1)
SODIUM SERPL-SCNC: 139 MMOL/L (ref 136–145)

## 2021-01-29 ENCOUNTER — TELEPHONE (OUTPATIENT)
Dept: FAMILY MEDICINE | Facility: CLINIC | Age: 66
End: 2021-01-29

## 2021-01-29 DIAGNOSIS — N39.0 URINARY TRACT INFECTION WITHOUT HEMATURIA, SITE UNSPECIFIED: Primary | ICD-10-CM

## 2021-01-29 RX ORDER — SULFAMETHOXAZOLE AND TRIMETHOPRIM 800; 160 MG/1; MG/1
1 TABLET ORAL 2 TIMES DAILY
Qty: 14 TABLET | Refills: 0 | Status: SHIPPED | OUTPATIENT
Start: 2021-01-29 | End: 2021-02-05

## 2021-02-01 ENCOUNTER — TELEPHONE (OUTPATIENT)
Dept: FAMILY MEDICINE | Facility: CLINIC | Age: 66
End: 2021-02-01

## 2021-02-01 DIAGNOSIS — E87.6 HYPOKALEMIA: Primary | ICD-10-CM

## 2021-02-01 RX ORDER — POTASSIUM CHLORIDE 20 MEQ/1
20 TABLET, EXTENDED RELEASE ORAL DAILY
Qty: 90 TABLET | Refills: 3 | Status: SHIPPED | OUTPATIENT
Start: 2021-02-01 | End: 2022-01-12

## 2021-02-04 ENCOUNTER — HOSPITAL ENCOUNTER (OUTPATIENT)
Dept: RADIOLOGY | Facility: HOSPITAL | Age: 66
Discharge: HOME | End: 2021-02-04
Attending: FAMILY MEDICINE
Payer: COMMERCIAL

## 2021-02-04 DIAGNOSIS — N20.0 RENAL STONES: ICD-10-CM

## 2021-02-04 PROCEDURE — 76770 US RETROPERITONEAL COMPLETE: ICD-10-PCS | Mod: 26,,, | Performed by: RADIOLOGY

## 2021-02-04 PROCEDURE — 76770 US EXAM ABDO BACK WALL COMP: CPT | Mod: 26,,, | Performed by: RADIOLOGY

## 2021-02-04 PROCEDURE — 76770 US EXAM ABDO BACK WALL COMP: CPT | Mod: TC

## 2021-02-12 ENCOUNTER — TELEPHONE (OUTPATIENT)
Dept: FAMILY MEDICINE | Facility: CLINIC | Age: 66
End: 2021-02-12

## 2021-04-05 ENCOUNTER — PATIENT MESSAGE (OUTPATIENT)
Dept: RESEARCH | Facility: HOSPITAL | Age: 66
End: 2021-04-05

## 2021-05-21 ENCOUNTER — LAB VISIT (OUTPATIENT)
Dept: LAB | Facility: HOSPITAL | Age: 66
End: 2021-05-21
Attending: FAMILY MEDICINE
Payer: COMMERCIAL

## 2021-05-21 DIAGNOSIS — I10 ESSENTIAL HYPERTENSION: ICD-10-CM

## 2021-05-21 LAB
ALBUMIN SERPL BCP-MCNC: 4.2 G/DL (ref 3.5–5.2)
ALP SERPL-CCNC: 85 U/L (ref 55–135)
ALT SERPL W/O P-5'-P-CCNC: 37 U/L (ref 10–44)
ANION GAP SERPL CALC-SCNC: 10 MMOL/L (ref 8–16)
AST SERPL-CCNC: 30 U/L (ref 10–40)
BASOPHILS # BLD AUTO: 0.06 K/UL (ref 0–0.2)
BASOPHILS NFR BLD: 1 % (ref 0–1.9)
BILIRUB SERPL-MCNC: 0.4 MG/DL (ref 0.1–1)
BUN SERPL-MCNC: 14 MG/DL (ref 8–23)
CALCIUM SERPL-MCNC: 9.8 MG/DL (ref 8.7–10.5)
CHLORIDE SERPL-SCNC: 106 MMOL/L (ref 95–110)
CHOLEST SERPL-MCNC: 164 MG/DL (ref 120–199)
CHOLEST/HDLC SERPL: 4.2 {RATIO} (ref 2–5)
CO2 SERPL-SCNC: 24 MMOL/L (ref 23–29)
CREAT SERPL-MCNC: 1 MG/DL (ref 0.5–1.4)
DIFFERENTIAL METHOD: ABNORMAL
EOSINOPHIL # BLD AUTO: 0.1 K/UL (ref 0–0.5)
EOSINOPHIL NFR BLD: 1.9 % (ref 0–8)
ERYTHROCYTE [DISTWIDTH] IN BLOOD BY AUTOMATED COUNT: 12.8 % (ref 11.5–14.5)
EST. GFR  (AFRICAN AMERICAN): >60 ML/MIN/1.73 M^2
EST. GFR  (NON AFRICAN AMERICAN): >60 ML/MIN/1.73 M^2
GLUCOSE SERPL-MCNC: 119 MG/DL (ref 70–110)
HCT VFR BLD AUTO: 48.1 % (ref 40–54)
HDLC SERPL-MCNC: 39 MG/DL (ref 40–75)
HDLC SERPL: 23.8 % (ref 20–50)
HGB BLD-MCNC: 16.2 G/DL (ref 14–18)
IMM GRANULOCYTES # BLD AUTO: 0.02 K/UL (ref 0–0.04)
IMM GRANULOCYTES NFR BLD AUTO: 0.3 % (ref 0–0.5)
LDLC SERPL CALC-MCNC: 83 MG/DL (ref 63–159)
LYMPHOCYTES # BLD AUTO: 2 K/UL (ref 1–4.8)
LYMPHOCYTES NFR BLD: 32.3 % (ref 18–48)
MCH RBC QN AUTO: 30.5 PG (ref 27–31)
MCHC RBC AUTO-ENTMCNC: 33.7 G/DL (ref 32–36)
MCV RBC AUTO: 90 FL (ref 82–98)
MONOCYTES # BLD AUTO: 1 K/UL (ref 0.3–1)
MONOCYTES NFR BLD: 15.2 % (ref 4–15)
NEUTROPHILS # BLD AUTO: 3.1 K/UL (ref 1.8–7.7)
NEUTROPHILS NFR BLD: 49.3 % (ref 38–73)
NONHDLC SERPL-MCNC: 125 MG/DL
NRBC BLD-RTO: 0 /100 WBC
PLATELET # BLD AUTO: 268 K/UL (ref 150–450)
PMV BLD AUTO: 11.8 FL (ref 9.2–12.9)
POTASSIUM SERPL-SCNC: 3.8 MMOL/L (ref 3.5–5.1)
PROT SERPL-MCNC: 7.7 G/DL (ref 6–8.4)
RBC # BLD AUTO: 5.32 M/UL (ref 4.6–6.2)
SODIUM SERPL-SCNC: 140 MMOL/L (ref 136–145)
TRIGL SERPL-MCNC: 210 MG/DL (ref 30–150)
TSH SERPL DL<=0.005 MIU/L-ACNC: 2.51 UIU/ML (ref 0.4–4)
WBC # BLD AUTO: 6.26 K/UL (ref 3.9–12.7)

## 2021-05-21 PROCEDURE — 85025 COMPLETE CBC W/AUTO DIFF WBC: CPT | Performed by: FAMILY MEDICINE

## 2021-05-21 PROCEDURE — 80061 LIPID PANEL: CPT | Performed by: FAMILY MEDICINE

## 2021-05-21 PROCEDURE — 36415 COLL VENOUS BLD VENIPUNCTURE: CPT | Mod: PO | Performed by: FAMILY MEDICINE

## 2021-05-21 PROCEDURE — 84443 ASSAY THYROID STIM HORMONE: CPT | Performed by: FAMILY MEDICINE

## 2021-05-21 PROCEDURE — 80053 COMPREHEN METABOLIC PANEL: CPT | Performed by: FAMILY MEDICINE

## 2021-05-26 ENCOUNTER — OFFICE VISIT (OUTPATIENT)
Dept: FAMILY MEDICINE | Facility: CLINIC | Age: 66
End: 2021-05-26
Payer: COMMERCIAL

## 2021-05-26 VITALS
BODY MASS INDEX: 40 KG/M2 | HEIGHT: 67 IN | HEART RATE: 101 BPM | OXYGEN SATURATION: 95 % | WEIGHT: 254.88 LBS | SYSTOLIC BLOOD PRESSURE: 114 MMHG | DIASTOLIC BLOOD PRESSURE: 72 MMHG

## 2021-05-26 DIAGNOSIS — I10 ESSENTIAL HYPERTENSION: Primary | ICD-10-CM

## 2021-05-26 DIAGNOSIS — E66.01 SEVERE OBESITY (BMI 35.0-39.9) WITH COMORBIDITY: ICD-10-CM

## 2021-05-26 DIAGNOSIS — R73.01 IMPAIRED FASTING BLOOD SUGAR: ICD-10-CM

## 2021-05-26 DIAGNOSIS — E78.5 DYSLIPIDEMIA: ICD-10-CM

## 2021-05-26 PROCEDURE — 99999 PR PBB SHADOW E&M-EST. PATIENT-LVL III: ICD-10-PCS | Mod: PBBFAC,,, | Performed by: FAMILY MEDICINE

## 2021-05-26 PROCEDURE — 99999 PR PBB SHADOW E&M-EST. PATIENT-LVL III: CPT | Mod: PBBFAC,,, | Performed by: FAMILY MEDICINE

## 2021-05-26 PROCEDURE — 99214 OFFICE O/P EST MOD 30 MIN: CPT | Mod: S$GLB,,, | Performed by: FAMILY MEDICINE

## 2021-05-26 PROCEDURE — 99214 PR OFFICE/OUTPT VISIT, EST, LEVL IV, 30-39 MIN: ICD-10-PCS | Mod: S$GLB,,, | Performed by: FAMILY MEDICINE

## 2021-11-19 ENCOUNTER — LAB VISIT (OUTPATIENT)
Dept: LAB | Facility: HOSPITAL | Age: 66
End: 2021-11-19
Attending: FAMILY MEDICINE
Payer: COMMERCIAL

## 2021-11-19 DIAGNOSIS — R73.01 IMPAIRED FASTING BLOOD SUGAR: ICD-10-CM

## 2021-11-19 DIAGNOSIS — E66.01 SEVERE OBESITY (BMI 35.0-39.9) WITH COMORBIDITY: ICD-10-CM

## 2021-11-19 DIAGNOSIS — E78.5 DYSLIPIDEMIA: ICD-10-CM

## 2021-11-19 DIAGNOSIS — I10 ESSENTIAL HYPERTENSION: ICD-10-CM

## 2021-11-19 LAB
ALBUMIN SERPL BCP-MCNC: 4.5 G/DL (ref 3.5–5.2)
ALP SERPL-CCNC: 74 U/L (ref 55–135)
ALT SERPL W/O P-5'-P-CCNC: 38 U/L (ref 10–44)
ANION GAP SERPL CALC-SCNC: 9 MMOL/L (ref 8–16)
AST SERPL-CCNC: 33 U/L (ref 10–40)
BILIRUB SERPL-MCNC: 0.7 MG/DL (ref 0.1–1)
BUN SERPL-MCNC: 11 MG/DL (ref 8–23)
CALCIUM SERPL-MCNC: 10.1 MG/DL (ref 8.7–10.5)
CHLORIDE SERPL-SCNC: 103 MMOL/L (ref 95–110)
CHOLEST SERPL-MCNC: 161 MG/DL (ref 120–199)
CHOLEST/HDLC SERPL: 3.7 {RATIO} (ref 2–5)
CO2 SERPL-SCNC: 27 MMOL/L (ref 23–29)
CREAT SERPL-MCNC: 1 MG/DL (ref 0.5–1.4)
EST. GFR  (AFRICAN AMERICAN): >60 ML/MIN/1.73 M^2
EST. GFR  (NON AFRICAN AMERICAN): >60 ML/MIN/1.73 M^2
ESTIMATED AVG GLUCOSE: 117 MG/DL (ref 68–131)
GLUCOSE SERPL-MCNC: 114 MG/DL (ref 70–110)
HBA1C MFR BLD: 5.7 % (ref 4–5.6)
HDLC SERPL-MCNC: 43 MG/DL (ref 40–75)
HDLC SERPL: 26.7 % (ref 20–50)
LDLC SERPL CALC-MCNC: 82 MG/DL (ref 63–159)
NONHDLC SERPL-MCNC: 118 MG/DL
POTASSIUM SERPL-SCNC: 4.6 MMOL/L (ref 3.5–5.1)
PROT SERPL-MCNC: 7.9 G/DL (ref 6–8.4)
SODIUM SERPL-SCNC: 139 MMOL/L (ref 136–145)
TRIGL SERPL-MCNC: 180 MG/DL (ref 30–150)

## 2021-11-19 PROCEDURE — 80061 LIPID PANEL: CPT | Performed by: FAMILY MEDICINE

## 2021-11-19 PROCEDURE — 83036 HEMOGLOBIN GLYCOSYLATED A1C: CPT | Performed by: FAMILY MEDICINE

## 2021-11-19 PROCEDURE — 36415 COLL VENOUS BLD VENIPUNCTURE: CPT | Mod: PO | Performed by: FAMILY MEDICINE

## 2021-11-19 PROCEDURE — 80053 COMPREHEN METABOLIC PANEL: CPT | Performed by: FAMILY MEDICINE

## 2021-12-01 ENCOUNTER — OFFICE VISIT (OUTPATIENT)
Dept: FAMILY MEDICINE | Facility: CLINIC | Age: 66
End: 2021-12-01
Payer: COMMERCIAL

## 2021-12-01 VITALS
DIASTOLIC BLOOD PRESSURE: 80 MMHG | HEART RATE: 80 BPM | OXYGEN SATURATION: 98 % | SYSTOLIC BLOOD PRESSURE: 120 MMHG | HEIGHT: 67 IN | WEIGHT: 255.31 LBS | BODY MASS INDEX: 40.07 KG/M2

## 2021-12-01 DIAGNOSIS — E66.01 SEVERE OBESITY (BMI 35.0-39.9) WITH COMORBIDITY: ICD-10-CM

## 2021-12-01 DIAGNOSIS — I10 PRIMARY HYPERTENSION: Primary | ICD-10-CM

## 2021-12-01 DIAGNOSIS — J06.9 UPPER RESPIRATORY TRACT INFECTION, UNSPECIFIED TYPE: ICD-10-CM

## 2021-12-01 DIAGNOSIS — R73.03 PREDIABETES: ICD-10-CM

## 2021-12-01 PROCEDURE — 99214 PR OFFICE/OUTPT VISIT, EST, LEVL IV, 30-39 MIN: ICD-10-PCS | Mod: S$GLB,,, | Performed by: FAMILY MEDICINE

## 2021-12-01 PROCEDURE — 99214 OFFICE O/P EST MOD 30 MIN: CPT | Mod: S$GLB,,, | Performed by: FAMILY MEDICINE

## 2021-12-01 PROCEDURE — 99999 PR PBB SHADOW E&M-EST. PATIENT-LVL III: CPT | Mod: PBBFAC,,, | Performed by: FAMILY MEDICINE

## 2021-12-01 PROCEDURE — 99999 PR PBB SHADOW E&M-EST. PATIENT-LVL III: ICD-10-PCS | Mod: PBBFAC,,, | Performed by: FAMILY MEDICINE

## 2021-12-01 RX ORDER — PREDNISONE 5 MG/1
5 TABLET ORAL 2 TIMES DAILY
Qty: 6 TABLET | Refills: 0 | Status: SHIPPED | OUTPATIENT
Start: 2021-12-01 | End: 2021-12-04

## 2021-12-01 RX ORDER — PROMETHAZINE HYDROCHLORIDE AND DEXTROMETHORPHAN HYDROBROMIDE 6.25; 15 MG/5ML; MG/5ML
5 SYRUP ORAL 4 TIMES DAILY PRN
Qty: 240 ML | Refills: 0 | Status: SHIPPED | OUTPATIENT
Start: 2021-12-01 | End: 2021-12-11

## 2022-01-08 DIAGNOSIS — E87.6 HYPOKALEMIA: ICD-10-CM

## 2022-01-08 DIAGNOSIS — I10 ESSENTIAL HYPERTENSION: ICD-10-CM

## 2022-01-08 DIAGNOSIS — E78.5 DYSLIPIDEMIA: ICD-10-CM

## 2022-01-08 NOTE — TELEPHONE ENCOUNTER
No new care gaps identified.  Powered by [employer]. Reference number: [id number].   1/08/2022 12:50:03 PM CST

## 2022-01-12 RX ORDER — ATORVASTATIN CALCIUM 10 MG/1
TABLET, FILM COATED ORAL
Qty: 90 TABLET | Refills: 3 | Status: SHIPPED | OUTPATIENT
Start: 2022-01-12 | End: 2023-01-20

## 2022-01-12 RX ORDER — POTASSIUM CHLORIDE 20 MEQ/1
TABLET, EXTENDED RELEASE ORAL
Qty: 90 TABLET | Refills: 3 | Status: SHIPPED | OUTPATIENT
Start: 2022-01-12 | End: 2023-01-20

## 2022-01-12 RX ORDER — FENOFIBRATE 134 MG/1
CAPSULE ORAL
Qty: 90 CAPSULE | Refills: 1 | Status: SHIPPED | OUTPATIENT
Start: 2022-01-12 | End: 2022-08-08

## 2022-01-12 RX ORDER — LOSARTAN POTASSIUM AND HYDROCHLOROTHIAZIDE 12.5; 1 MG/1; MG/1
1 TABLET ORAL DAILY
Qty: 90 TABLET | Refills: 3 | Status: SHIPPED | OUTPATIENT
Start: 2022-01-12 | End: 2023-01-20

## 2022-01-12 NOTE — TELEPHONE ENCOUNTER
Refill Routing Note   Medication(s) are not appropriate for processing by [facility] for the following reason(s):      - Drug-Disease Interaction ( losartan-hydrochlorothiazide 100-12.5 mg and Hypokalemia)    [facility] action(s):  Defer  Approve Medication-related problems identified: Drug-disease interaction     Medication Therapy Plan: Drug-Disease: losartan-hydrochlorothiazide 100-12.5 mg and Hypokalemia; Defer losartan-hydrochlorothiazide; Approve potassium chloride, fenofibrate micronized and atorvastatin   --->Care Gap information included in message below if applicable.   Medication reconciliation completed: No   Automatic Epic Generated Protocol Data:    Orders Placed This Encounter    atorvastatin (LIPITOR) 10 MG tablet    fenofibrate micronized (LOFIBRA) 134 MG Cap    potassium chloride SA (K-DUR,KLOR-CON) 20 MEQ tablet      Requested Prescriptions   Pending Prescriptions Disp Refills    losartan-hydrochlorothiazide 100-12.5 mg (HYZAAR) 100-12.5 mg Tab [Pharmacy Med Name: Losartan Potassium-HCTZ 100-12.5 MG Oral Tablet] 90 tablet 3     Sig: Take 1 tablet by mouth once daily       Cardiovascular: ARB + Diuretic Combos Passed - 1/8/2022 12:50 PM        Passed - Patient is at least 18 years old        Passed - Last BP in normal range within 360 days     BP Readings from Last 1 Encounters:   12/01/21 120/80               Passed - Valid encounter within last 15 months     Recent Visits  Date Type Provider Dept   12/01/21 Office Visit Balwinder Alvarado MD [facility]   05/26/21 Office Visit Balwinder Alvarado MD [facility]   01/26/21 Office Visit Balwinder Alvarado MD [facility]   07/06/20 Office Visit Balwinder Alvarado MD [facility]   Showing recent visits within past 720 days and meeting all other requirements  Future Appointments  No visits were found meeting these conditions.  Showing future appointments within next 150 days and meeting  all other requirements      Future Appointments              In 4 months [facility] [place] [facility] [place]    In 4 months [facility], [facility] [place] [facility] [place]    In 4 months MD [place] - [facility], [place]                Passed - K in normal range and within 360 days     Potassium   Date Value Ref Range Status   11/19/2021 4.6 3.5 - 5.1 mmol/L Final   05/21/2021 3.8 3.5 - 5.1 mmol/L Final   01/26/2021 3.3 (L) 3.5 - 5.1 mmol/L Final              Passed - Na is between 130 and 148 and within 360 days     Sodium   Date Value Ref Range Status   11/19/2021 139 136 - 145 mmol/L Final   05/21/2021 140 136 - 145 mmol/L Final   01/26/2021 139 136 - 145 mmol/L Final              Passed - Cr is 1.39 or below and within 360 days     Lab Results   Component Value Date    CREATININE 1.0 11/19/2021    CREATININE 1.0 05/21/2021    CREATININE 1.0 01/26/2021              Passed - eGFR within 360 days     Lab Results   Component Value Date    EGFRNONAA >60.0 11/19/2021    EGFRNONAA >60.0 05/21/2021    EGFRNONAA >60.0 01/26/2021                 Signed Prescriptions Disp Refills    atorvastatin (LIPITOR) 10 MG tablet 90 tablet 3     Sig: TAKE 1 TABLET BY MOUTH ONCE DAILY IN THE EVENING       Cardiovascular:  Antilipid - Statins Passed - 1/8/2022 12:50 PM        Passed - Patient is at least 18 years old        Passed - Valid encounter within last 15 months     Recent Visits  Date Type Provider Dept   12/01/21 Office Visit Balwinder Alvarado MD [facility]   05/26/21 Office Visit Balwinder Alvarado MD [facility]   01/26/21 Office Visit Balwinder Alvarado MD [facility]   07/06/20 Office Visit Balwinder Alvarado MD [facility]   Showing recent visits within past 720 days and meeting all other requirements  Future Appointments  No visits were found meeting these conditions.  Showing future appointments within next 150 days and meeting  all other requirements      Future Appointments              In 4 months [facility] [place] [facility] [place]    In 4 months [facility], [facility] [place] [facility] [place]    In 4 months MD [place] - [facility]                Passed - ALT is 131 or below and within 360 days     ALT   Date Value Ref Range Status   11/19/2021 38 10 - 44 U/L Final   05/21/2021 37 10 - 44 U/L Final   07/10/2020 44 10 - 44 U/L Final              Passed - AST is 119 or below and within 360 days     AST   Date Value Ref Range Status   11/19/2021 33 10 - 40 U/L Final   05/21/2021 30 10 - 40 U/L Final   07/10/2020 31 10 - 40 U/L Final              Passed - Total Cholesterol within 360 days     Lab Results   Component Value Date    CHOL 161 11/19/2021    CHOL 164 05/21/2021    CHOL 151 07/10/2020              Passed - LDL within 360 days     LDL Cholesterol   Date Value Ref Range Status   11/19/2021 82.0 63.0 - 159.0 mg/dL Final     Comment:     The National Cholesterol Education Program (NCEP) has set the  following guidelines (reference values) for LDL Cholesterol:  Optimal.......................<130 mg/dL  Borderline High...............130-159 mg/dL  High..........................160-189 mg/dL  Very High.....................>190 mg/dL              Passed - HDL within 360 days     HDL   Date Value Ref Range Status   11/19/2021 43 40 - 75 mg/dL Final     Comment:     The National Cholesterol Education Program (NCEP) has set the  following guidelines (reference values) for HDL Cholesterol:  Low...............<40 mg/dL  Optimal...........>60 mg/dL              Passed - Triglycerides within 360 days     Lab Results   Component Value Date    TRIG 180 (H) 11/19/2021    TRIG 210 (H) 05/21/2021    TRIG 172 (H) 07/10/2020                fenofibrate micronized (LOFIBRA) 134 MG Cap 90 capsule 1     Sig: TAKE 1 CAPSULE BY MOUTH ONCE DAILY WITH BREAKFAST       Cardiovascular:  Antilipid - Fibric Acid  Derivatives Passed - 1/8/2022 12:50 PM        Passed - Patient is at least 18 years old        Passed - Valid encounter within last 15 months     Recent Visits  Date Type Provider Dept   12/01/21 Office Visit Balwinder Alvarado MD [facility]   05/26/21 Office Visit Balwinder Alvarado MD [facility]   01/26/21 Office Visit Balwinder Alvarado MD [facility]   07/06/20 Office Visit Balwinder Alvarado MD [facility]   Showing recent visits within past 720 days and meeting all other requirements  Future Appointments  No visits were found meeting these conditions.  Showing future appointments within next 150 days and meeting all other requirements      Future Appointments              In 4 months [facility] [place] [facility] [place]    In 4 months [facility], [facility] [place] [facility] [place]    In 4 months Balwinder Alvarado MD [facility], [place]                Passed - ALT is 119 or below and within 360 days     ALT   Date Value Ref Range Status   11/19/2021 38 10 - 44 U/L Final   05/21/2021 37 10 - 44 U/L Final   07/10/2020 44 10 - 44 U/L Final              Passed - AST is 131 or below and within 360 days     AST   Date Value Ref Range Status   11/19/2021 33 10 - 40 U/L Final   05/21/2021 30 10 - 40 U/L Final   07/10/2020 31 10 - 40 U/L Final              Passed - Cr is 1.39 or below and within 360 days     Lab Results   Component Value Date    CREATININE 1.0 11/19/2021    CREATININE 1.0 05/21/2021    CREATININE 1.0 01/26/2021              Passed - eGFR is 30 or above and within 360 days     Lab Results   Component Value Date    EGFRNONAA >60.0 11/19/2021    EGFRNONAA >60.0 05/21/2021    EGFRNONAA >60.0 01/26/2021                Passed - WBC within 360 days     WBC   Date Value Ref Range Status   05/21/2021 6.26 3.90 - 12.70 K/uL Final   01/26/2021 6.91 3.90 - 12.70 K/uL Final   03/27/2020 6.71 3.90 - 12.70 K/uL Final               Passed - Total Cholesterol within 360 days     Lab Results   Component Value Date    CHOL 161 11/19/2021    CHOL 164 05/21/2021    CHOL 151 07/10/2020              Passed - LDL within 360 days     LDL Cholesterol   Date Value Ref Range Status   11/19/2021 82.0 63.0 - 159.0 mg/dL Final     Comment:     The National Cholesterol Education Program (NCEP) has set the  following guidelines (reference values) for LDL Cholesterol:  Optimal.......................<130 mg/dL  Borderline High...............130-159 mg/dL  High..........................160-189 mg/dL  Very High.....................>190 mg/dL              Passed - HDL within 360 days     HDL   Date Value Ref Range Status   11/19/2021 43 40 - 75 mg/dL Final     Comment:     The National Cholesterol Education Program (NCEP) has set the  following guidelines (reference values) for HDL Cholesterol:  Low...............<40 mg/dL  Optimal...........>60 mg/dL              Passed - Triglycerides within 360 days     Lab Results   Component Value Date    TRIG 180 (H) 11/19/2021    TRIG 210 (H) 05/21/2021    TRIG 172 (H) 07/10/2020                potassium chloride SA (K-DUR,KLOR-CON) 20 MEQ tablet 90 tablet 3     Sig: Take 1 tablet by mouth once daily       Endocrinology:  Minerals - Potassium Supplementation Passed - 1/8/2022 12:50 PM        Passed - Patient is at least 18 years old        Passed - Valid encounter within last 15 months     Recent Visits  Date Type Provider Dept   12/01/21 Office Visit Balwinder Alvarado MD [facility]   05/26/21 Office Visit Balwinder Alvarado MD [facility]   01/26/21 Office Visit Balwinder Alvarado MD [facility]   07/06/20 Office Visit Balwinder Alvarado MD [facility]   Showing recent visits within past 720 days and meeting all other requirements  Future Appointments  No visits were found meeting these conditions.  Showing future appointments within next 150 days and meeting all  other requirements      Future Appointments              In 4 months [facility] [place] [facility] [place]    In 4 months [facility], [facility] [place] [facility] [place]    In 4 months MD Ovid[place] - [facility] [place]                Passed - K is 5.2 or below and within 360 days     Potassium   Date Value Ref Range Status   11/19/2021 4.6 3.5 - 5.1 mmol/L Final   05/21/2021 3.8 3.5 - 5.1 mmol/L Final   01/26/2021 3.3 (L) 3.5 - 5.1 mmol/L Final              Passed - Cr is 1.39 or below and within 360 days     Lab Results   Component Value Date    CREATININE 1.0 11/19/2021    CREATININE 1.0 05/21/2021    CREATININE 1.0 01/26/2021              Passed - eGFR within 360 days     Lab Results   Component Value Date    EGFRNONAA >60.0 11/19/2021    EGFRNONAA >60.0 05/21/2021    EGFRNONAA >60.0 01/26/2021                      Appointments  past 12m or future 3m with PCP    Date Provider   Last Visit   12/1/2021 Balwinder Alvarado MD   Next Visit   6/1/2022 Balwinder Alvarado MD   ED visits in past 90 days: 0        Note composed:9:16 AM 01/12/2022

## 2022-05-27 ENCOUNTER — LAB VISIT (OUTPATIENT)
Dept: LAB | Facility: HOSPITAL | Age: 67
End: 2022-05-27
Attending: FAMILY MEDICINE
Payer: COMMERCIAL

## 2022-05-27 DIAGNOSIS — R73.03 PREDIABETES: ICD-10-CM

## 2022-05-27 DIAGNOSIS — I10 PRIMARY HYPERTENSION: ICD-10-CM

## 2022-05-27 LAB
ALBUMIN SERPL BCP-MCNC: 4.2 G/DL (ref 3.5–5.2)
ALP SERPL-CCNC: 72 U/L (ref 55–135)
ALT SERPL W/O P-5'-P-CCNC: 31 U/L (ref 10–44)
ANION GAP SERPL CALC-SCNC: 10 MMOL/L (ref 8–16)
AST SERPL-CCNC: 29 U/L (ref 10–40)
BILIRUB SERPL-MCNC: 0.4 MG/DL (ref 0.1–1)
BUN SERPL-MCNC: 13 MG/DL (ref 8–23)
CALCIUM SERPL-MCNC: 9.4 MG/DL (ref 8.7–10.5)
CHLORIDE SERPL-SCNC: 105 MMOL/L (ref 95–110)
CHOLEST SERPL-MCNC: 145 MG/DL (ref 120–199)
CHOLEST/HDLC SERPL: 3.5 {RATIO} (ref 2–5)
CO2 SERPL-SCNC: 24 MMOL/L (ref 23–29)
CREAT SERPL-MCNC: 1.1 MG/DL (ref 0.5–1.4)
EST. GFR  (AFRICAN AMERICAN): >60 ML/MIN/1.73 M^2
EST. GFR  (NON AFRICAN AMERICAN): >60 ML/MIN/1.73 M^2
ESTIMATED AVG GLUCOSE: 117 MG/DL (ref 68–131)
GLUCOSE SERPL-MCNC: 113 MG/DL (ref 70–110)
HBA1C MFR BLD: 5.7 % (ref 4–5.6)
HDLC SERPL-MCNC: 41 MG/DL (ref 40–75)
HDLC SERPL: 28.3 % (ref 20–50)
LDLC SERPL CALC-MCNC: 78.8 MG/DL (ref 63–159)
NONHDLC SERPL-MCNC: 104 MG/DL
POTASSIUM SERPL-SCNC: 4.3 MMOL/L (ref 3.5–5.1)
PROT SERPL-MCNC: 7.4 G/DL (ref 6–8.4)
SODIUM SERPL-SCNC: 139 MMOL/L (ref 136–145)
TRIGL SERPL-MCNC: 126 MG/DL (ref 30–150)

## 2022-05-27 PROCEDURE — 36415 COLL VENOUS BLD VENIPUNCTURE: CPT | Mod: PO | Performed by: FAMILY MEDICINE

## 2022-05-27 PROCEDURE — 83036 HEMOGLOBIN GLYCOSYLATED A1C: CPT | Performed by: FAMILY MEDICINE

## 2022-05-27 PROCEDURE — 80061 LIPID PANEL: CPT | Performed by: FAMILY MEDICINE

## 2022-05-27 PROCEDURE — 80053 COMPREHEN METABOLIC PANEL: CPT | Performed by: FAMILY MEDICINE

## 2022-06-06 ENCOUNTER — LAB VISIT (OUTPATIENT)
Dept: LAB | Facility: HOSPITAL | Age: 67
End: 2022-06-06
Attending: FAMILY MEDICINE
Payer: COMMERCIAL

## 2022-06-06 ENCOUNTER — OFFICE VISIT (OUTPATIENT)
Dept: FAMILY MEDICINE | Facility: CLINIC | Age: 67
End: 2022-06-06
Payer: COMMERCIAL

## 2022-06-06 VITALS
DIASTOLIC BLOOD PRESSURE: 78 MMHG | SYSTOLIC BLOOD PRESSURE: 136 MMHG | BODY MASS INDEX: 39.31 KG/M2 | HEART RATE: 97 BPM | WEIGHT: 250.44 LBS | OXYGEN SATURATION: 98 % | HEIGHT: 67 IN

## 2022-06-06 DIAGNOSIS — I10 PRIMARY HYPERTENSION: Primary | ICD-10-CM

## 2022-06-06 DIAGNOSIS — R73.03 PREDIABETES: ICD-10-CM

## 2022-06-06 DIAGNOSIS — Z12.5 SCREENING FOR PROSTATE CANCER: ICD-10-CM

## 2022-06-06 DIAGNOSIS — E66.01 SEVERE OBESITY (BMI 35.0-39.9) WITH COMORBIDITY: ICD-10-CM

## 2022-06-06 DIAGNOSIS — Z23 NEED FOR STREPTOCOCCUS PNEUMONIAE VACCINATION: ICD-10-CM

## 2022-06-06 DIAGNOSIS — J06.9 UPPER RESPIRATORY TRACT INFECTION, UNSPECIFIED TYPE: ICD-10-CM

## 2022-06-06 PROCEDURE — 99214 OFFICE O/P EST MOD 30 MIN: CPT | Mod: 25,S$GLB,, | Performed by: FAMILY MEDICINE

## 2022-06-06 PROCEDURE — 90471 IMMUNIZATION ADMIN: CPT | Mod: S$GLB,,, | Performed by: FAMILY MEDICINE

## 2022-06-06 PROCEDURE — 99214 PR OFFICE/OUTPT VISIT, EST, LEVL IV, 30-39 MIN: ICD-10-PCS | Mod: 25,S$GLB,, | Performed by: FAMILY MEDICINE

## 2022-06-06 PROCEDURE — 99999 PR PBB SHADOW E&M-EST. PATIENT-LVL III: ICD-10-PCS | Mod: PBBFAC,,, | Performed by: FAMILY MEDICINE

## 2022-06-06 PROCEDURE — 84153 ASSAY OF PSA TOTAL: CPT | Performed by: FAMILY MEDICINE

## 2022-06-06 PROCEDURE — 90677 PNEUMOCOCCAL CONJUGATE VACCINE 20-VALENT: ICD-10-PCS | Mod: S$GLB,,, | Performed by: FAMILY MEDICINE

## 2022-06-06 PROCEDURE — 99999 PR PBB SHADOW E&M-EST. PATIENT-LVL III: CPT | Mod: PBBFAC,,, | Performed by: FAMILY MEDICINE

## 2022-06-06 PROCEDURE — 90677 PCV20 VACCINE IM: CPT | Mod: S$GLB,,, | Performed by: FAMILY MEDICINE

## 2022-06-06 PROCEDURE — 90471 PNEUMOCOCCAL CONJUGATE VACCINE 20-VALENT: ICD-10-PCS | Mod: S$GLB,,, | Performed by: FAMILY MEDICINE

## 2022-06-06 PROCEDURE — 36415 COLL VENOUS BLD VENIPUNCTURE: CPT | Mod: PO | Performed by: FAMILY MEDICINE

## 2022-06-06 NOTE — PROGRESS NOTES
Subjective:       Patient ID: Darrian Velez is a 67 y.o. male.    Chief Complaint: Follow-up and Hypertension    67 years old male came to the clinic for blood pressure check.  Blood pressure today was stable..  No chest pain, palpitation, orthopnea or PND.  Patient with cough and congestion last week is doing better right now.  Last A1c at the level of prediabetes.  No polyuria, polydipsia or polyphagia.  Patient with a BMI of 39 currently trying to lose weight.    Review of Systems   Constitutional: Negative.    HENT: Negative.    Eyes: Negative.    Respiratory: Negative.    Cardiovascular: Negative.  Negative for chest pain, palpitations, leg swelling and claudication.   Gastrointestinal: Negative.    Genitourinary: Negative.    Musculoskeletal: Negative.    Integumentary:  Negative.   Neurological: Negative.    Psychiatric/Behavioral: Negative.          Objective:      Physical Exam    Assessment:       Problem List Items Addressed This Visit     Hypertension - Primary    Relevant Orders    Comprehensive Metabolic Panel    Lipid Panel      Other Visit Diagnoses     Upper respiratory tract infection, unspecified type        Need for Streptococcus pneumoniae vaccination        Relevant Orders    (In Office Administered) Pneumococcal Conjugate Vaccine (20 Valent) (IM)    Screening for prostate cancer        Relevant Orders    PSA, Screening    Prediabetes        Relevant Orders    Comprehensive Metabolic Panel    Hemoglobin A1C    Lipid Panel    Microalbumin/Creatinine Ratio, Urine    Severe obesity (BMI 35.0-39.9) with comorbidity              Plan:           Darrian was seen today for follow-up and hypertension.    Diagnoses and all orders for this visit:    Primary hypertension  -     Comprehensive Metabolic Panel; Future  -     Lipid Panel; Future    Upper respiratory tract infection, unspecified type    Need for Streptococcus pneumoniae vaccination  -     (In Office Administered) Pneumococcal Conjugate  Vaccine (20 Valent) (IM)    Screening for prostate cancer  -     PSA, Screening; Future    Prediabetes  -     Comprehensive Metabolic Panel; Future  -     Hemoglobin A1C; Future  -     Lipid Panel; Future  -     Microalbumin/Creatinine Ratio, Urine; Future    Severe obesity (BMI 35.0-39.9) with comorbidity     Diet and physical activity to promote weight loss.  Continue monitoring blood pressure at home, low sodium diet.

## 2022-06-07 LAB — COMPLEXED PSA SERPL-MCNC: 0.18 NG/ML (ref 0–4)

## 2022-07-19 ENCOUNTER — PATIENT MESSAGE (OUTPATIENT)
Dept: RESEARCH | Facility: CLINIC | Age: 67
End: 2022-07-19
Payer: COMMERCIAL

## 2022-08-05 DIAGNOSIS — E78.5 DYSLIPIDEMIA: ICD-10-CM

## 2022-08-05 NOTE — TELEPHONE ENCOUNTER
Care Due:                  Date            Visit Type   Department     Provider  --------------------------------------------------------------------------------                                EP -                              PRIMARY      [facility] FAMILY  Last Visit: 06-      CARE ([facility])   MARGARITA Alvarado                              EP -                              PRIMARY      [facility]  Next Visit: 12-      CARE ([facility])   MARGARITA Alvarado                                                            Last  Test          Frequency    Reason                     Performed    Due Date  --------------------------------------------------------------------------------    CBC.........  12 months..  fenofibrate..............  05- 05-    [facility] Embedded Care Gaps. Reference number: [id number]. 8/05/2022   11:59:47 AM CDT

## 2022-08-05 NOTE — TELEPHONE ENCOUNTER
Refill Routing Note   Medication(s) are not appropriate for processing by [facility] for the following reason(s):      - Required laboratory values are outdated    [facility] action(s):  Defer Medication-related problems identified: Requires labs     Medication Therapy Plan: FOV  Medication reconciliation completed: No     Appointments  past 12m or future 3m with PCP    Date Provider   Last Visit   6/6/2022 Balwinder Alvarado MD   Next Visit   12/6/2022 Balwinder Alvarado MD   ED visits in past 90 days: 0        Note composed:6:25 PM 08/05/2022

## 2022-08-08 RX ORDER — FENOFIBRATE 134 MG/1
CAPSULE ORAL
Qty: 90 CAPSULE | Refills: 0 | Status: SHIPPED | OUTPATIENT
Start: 2022-08-08 | End: 2022-11-14

## 2022-12-02 ENCOUNTER — LAB VISIT (OUTPATIENT)
Dept: LAB | Facility: HOSPITAL | Age: 67
End: 2022-12-02
Attending: FAMILY MEDICINE
Payer: COMMERCIAL

## 2022-12-02 DIAGNOSIS — I10 PRIMARY HYPERTENSION: ICD-10-CM

## 2022-12-02 DIAGNOSIS — R73.03 PREDIABETES: ICD-10-CM

## 2022-12-02 LAB
ALBUMIN SERPL BCP-MCNC: 4.3 G/DL (ref 3.5–5.2)
ALP SERPL-CCNC: 78 U/L (ref 55–135)
ALT SERPL W/O P-5'-P-CCNC: 43 U/L (ref 10–44)
ANION GAP SERPL CALC-SCNC: 8 MMOL/L (ref 8–16)
AST SERPL-CCNC: 31 U/L (ref 10–40)
BILIRUB SERPL-MCNC: 0.4 MG/DL (ref 0.1–1)
BUN SERPL-MCNC: 11 MG/DL (ref 8–23)
CALCIUM SERPL-MCNC: 9.6 MG/DL (ref 8.7–10.5)
CHLORIDE SERPL-SCNC: 103 MMOL/L (ref 95–110)
CHOLEST SERPL-MCNC: 152 MG/DL (ref 120–199)
CHOLEST/HDLC SERPL: 4.1 {RATIO} (ref 2–5)
CO2 SERPL-SCNC: 26 MMOL/L (ref 23–29)
CREAT SERPL-MCNC: 1.1 MG/DL (ref 0.5–1.4)
EST. GFR  (NO RACE VARIABLE): >60 ML/MIN/1.73 M^2
ESTIMATED AVG GLUCOSE: 111 MG/DL (ref 68–131)
GLUCOSE SERPL-MCNC: 124 MG/DL (ref 70–110)
HBA1C MFR BLD: 5.5 % (ref 4–5.6)
HDLC SERPL-MCNC: 37 MG/DL (ref 40–75)
HDLC SERPL: 24.3 % (ref 20–50)
LDLC SERPL CALC-MCNC: 70.4 MG/DL (ref 63–159)
NONHDLC SERPL-MCNC: 115 MG/DL
POTASSIUM SERPL-SCNC: 4 MMOL/L (ref 3.5–5.1)
PROT SERPL-MCNC: 7.6 G/DL (ref 6–8.4)
SODIUM SERPL-SCNC: 137 MMOL/L (ref 136–145)
TRIGL SERPL-MCNC: 223 MG/DL (ref 30–150)

## 2022-12-02 PROCEDURE — 83036 HEMOGLOBIN GLYCOSYLATED A1C: CPT | Performed by: FAMILY MEDICINE

## 2022-12-02 PROCEDURE — 36415 COLL VENOUS BLD VENIPUNCTURE: CPT | Mod: PO | Performed by: FAMILY MEDICINE

## 2022-12-02 PROCEDURE — 80061 LIPID PANEL: CPT | Performed by: FAMILY MEDICINE

## 2022-12-02 PROCEDURE — 80053 COMPREHEN METABOLIC PANEL: CPT | Performed by: FAMILY MEDICINE

## 2022-12-06 ENCOUNTER — OFFICE VISIT (OUTPATIENT)
Dept: FAMILY MEDICINE | Facility: CLINIC | Age: 67
End: 2022-12-06
Payer: COMMERCIAL

## 2022-12-06 VITALS
DIASTOLIC BLOOD PRESSURE: 70 MMHG | HEIGHT: 67 IN | WEIGHT: 251.75 LBS | HEART RATE: 84 BPM | OXYGEN SATURATION: 97 % | SYSTOLIC BLOOD PRESSURE: 124 MMHG | BODY MASS INDEX: 39.51 KG/M2

## 2022-12-06 DIAGNOSIS — R73.03 PREDIABETES: ICD-10-CM

## 2022-12-06 DIAGNOSIS — I10 PRIMARY HYPERTENSION: Primary | ICD-10-CM

## 2022-12-06 DIAGNOSIS — E66.01 SEVERE OBESITY (BMI 35.0-39.9) WITH COMORBIDITY: ICD-10-CM

## 2022-12-06 DIAGNOSIS — E78.5 HYPERLIPIDEMIA, UNSPECIFIED HYPERLIPIDEMIA TYPE: ICD-10-CM

## 2022-12-06 PROCEDURE — 99215 OFFICE O/P EST HI 40 MIN: CPT | Mod: S$GLB,,, | Performed by: FAMILY MEDICINE

## 2022-12-06 PROCEDURE — 99999 PR PBB SHADOW E&M-EST. PATIENT-LVL IV: ICD-10-PCS | Mod: PBBFAC,,, | Performed by: FAMILY MEDICINE

## 2022-12-06 PROCEDURE — 99215 PR OFFICE/OUTPT VISIT, EST, LEVL V, 40-54 MIN: ICD-10-PCS | Mod: S$GLB,,, | Performed by: FAMILY MEDICINE

## 2022-12-06 PROCEDURE — 99999 PR PBB SHADOW E&M-EST. PATIENT-LVL IV: CPT | Mod: PBBFAC,,, | Performed by: FAMILY MEDICINE

## 2022-12-06 NOTE — PROGRESS NOTES
Subjective:       Patient ID: Darrian Velez is a 67 y.o. male.    Chief Complaint: Follow-up    67 years old male came to the clinic for blood pressure check.  Blood pressure today was stable.  No chest pain, palpitation, orthopnea or PND.  Patient with prediabetes currently stable.  Last cholesterol was control.  Patient with a BMI of 39 currently trying to lose weight.    Follow-up  Pertinent negatives include no chest pain.   Review of Systems   Constitutional: Negative.    HENT: Negative.     Eyes: Negative.    Respiratory: Negative.     Cardiovascular: Negative.  Negative for chest pain, palpitations, leg swelling and claudication.   Gastrointestinal: Negative.    Endocrine: Negative for polydipsia, polyphagia and polyuria.   Genitourinary: Negative.    Musculoskeletal: Negative.    Integumentary:  Negative.   Neurological: Negative.    Psychiatric/Behavioral: Negative.         Objective:      Physical Exam  Vitals and nursing note reviewed.   Constitutional:       General: He is not in acute distress.     Appearance: He is well-developed. He is not diaphoretic.   HENT:      Head: Normocephalic and atraumatic.      Right Ear: External ear normal.      Left Ear: External ear normal.      Nose: Nose normal.      Mouth/Throat:      Pharynx: No oropharyngeal exudate.   Eyes:      General: No scleral icterus.        Right eye: No discharge.         Left eye: No discharge.      Conjunctiva/sclera: Conjunctivae normal.      Pupils: Pupils are equal, round, and reactive to light.   Neck:      Thyroid: No thyromegaly.      Vascular: No JVD.      Trachea: No tracheal deviation.   Cardiovascular:      Rate and Rhythm: Normal rate and regular rhythm.      Heart sounds: Normal heart sounds. No murmur heard.    No friction rub. No gallop.   Pulmonary:      Effort: Pulmonary effort is normal. No respiratory distress.      Breath sounds: Normal breath sounds. No stridor. No wheezing or rales.   Chest:      Chest wall: No  tenderness.   Abdominal:      General: Bowel sounds are normal. There is no distension.      Palpations: Abdomen is soft. There is no mass.      Tenderness: There is no abdominal tenderness. There is no guarding or rebound.   Musculoskeletal:         General: No tenderness. Normal range of motion.      Cervical back: Normal range of motion and neck supple.   Lymphadenopathy:      Cervical: No cervical adenopathy.   Skin:     General: Skin is warm and dry.      Coloration: Skin is not pale.      Findings: No erythema or rash.   Neurological:      Mental Status: He is alert and oriented to person, place, and time.      Cranial Nerves: No cranial nerve deficit.      Motor: No abnormal muscle tone.      Coordination: Coordination normal.      Deep Tendon Reflexes: Reflexes are normal and symmetric. Reflexes normal.   Psychiatric:         Behavior: Behavior normal.         Thought Content: Thought content normal.         Judgment: Judgment normal.       Assessment:       Problem List Items Addressed This Visit       Hypertension - Primary    Relevant Orders    CBC Auto Differential    Comprehensive Metabolic Panel    Lipid Panel    Urinalysis    Hyperlipidemia    Relevant Orders    CBC Auto Differential    Comprehensive Metabolic Panel    Lipid Panel     Other Visit Diagnoses       Prediabetes        Relevant Orders    Comprehensive Metabolic Panel    Hemoglobin A1C    Severe obesity (BMI 35.0-39.9) with comorbidity                  Plan:         Darrian was seen today for follow-up.    Diagnoses and all orders for this visit:    Primary hypertension  -     CBC Auto Differential; Future  -     Comprehensive Metabolic Panel; Future  -     Lipid Panel; Future  -     Urinalysis; Future    Hyperlipidemia, unspecified hyperlipidemia type  -     CBC Auto Differential; Future  -     Comprehensive Metabolic Panel; Future  -     Lipid Panel; Future    Prediabetes  -     Comprehensive Metabolic Panel; Future  -     Hemoglobin A1C;  Future    Severe obesity (BMI 35.0-39.9) with comorbidity    Diet and physical activity to promote weight loss.     Continue monitoring blood pressure at home, low sodium diet.

## 2023-04-04 ENCOUNTER — PATIENT MESSAGE (OUTPATIENT)
Dept: FAMILY MEDICINE | Facility: CLINIC | Age: 68
End: 2023-04-04

## 2023-04-04 ENCOUNTER — E-VISIT (OUTPATIENT)
Dept: FAMILY MEDICINE | Facility: CLINIC | Age: 68
End: 2023-04-04
Payer: COMMERCIAL

## 2023-04-04 DIAGNOSIS — B96.89 BACTERIAL SINUSITIS: Primary | ICD-10-CM

## 2023-04-04 DIAGNOSIS — J32.9 BACTERIAL SINUSITIS: Primary | ICD-10-CM

## 2023-04-04 PROCEDURE — 99442 PR PHYSICIAN TELEPHONE EVALUATION 11-20 MIN: ICD-10-PCS | Mod: 95,,, | Performed by: FAMILY MEDICINE

## 2023-04-04 PROCEDURE — 99442 PR PHYSICIAN TELEPHONE EVALUATION 11-20 MIN: CPT | Mod: 95,,, | Performed by: FAMILY MEDICINE

## 2023-04-04 RX ORDER — GUAIFENESIN 600 MG/1
600 TABLET, EXTENDED RELEASE ORAL 2 TIMES DAILY
Qty: 20 TABLET | Refills: 0 | Status: SHIPPED | OUTPATIENT
Start: 2023-04-04 | End: 2023-04-14

## 2023-04-04 RX ORDER — AZITHROMYCIN 500 MG/1
500 TABLET, FILM COATED ORAL DAILY
Qty: 3 TABLET | Refills: 0 | Status: SHIPPED | OUTPATIENT
Start: 2023-04-04 | End: 2023-04-07

## 2023-04-04 RX ORDER — CETIRIZINE HYDROCHLORIDE 10 MG/1
10 TABLET ORAL DAILY
Qty: 10 TABLET | Refills: 0 | Status: SHIPPED | OUTPATIENT
Start: 2023-04-04 | End: 2023-08-04

## 2023-04-04 NOTE — PROGRESS NOTES
The patient location is: [place]  The chief complaint leading to consultation is: sinus    Visit type: e visit    Face to Face time with patient: 12 minutes of total time spent on the encounter, which includes face to face time and non-face to face time preparing to see the patient (eg, review of tests), Obtaining and/or reviewing separately obtained history, Documenting clinical information in the electronic or other health record, Independently interpreting results (not separately reported) and communicating results to the patient/family/caregiver, or Care coordination (not separately reported).         Each patient to whom he or she provides medical services by telemedicine is:  (1) informed of the relationship between the physician and patient and the respective role of any other health care provider with respect to management of the patient; and (2) notified that he or she may decline to receive medical services by telemedicine and may withdraw from such care at any time.  68 years old male who was evaluated by telemedicine with sinus symptoms for the last week.  Patient with cough tiredness and headache.  He also reports sore throat and nasal congestion.  COVID testing at home was negative.  Notes: Diagnoses and all orders for this visit:    Bacterial sinusitis  -     guaiFENesin (MUCINEX) 600 mg 12 hr tablet; Take 1 tablet (600 mg total) by mouth 2 (two) times daily. for 10 days  -     cetirizine (ZYRTEC) 10 MG tablet; Take 1 tablet (10 mg total) by mouth once daily. for 10 days  -     azithromycin (ZITHROMAX) 500 MG tablet; Take 1 tablet (500 mg total) by mouth once daily. for 3 days

## 2023-04-19 DIAGNOSIS — E87.6 HYPOKALEMIA: ICD-10-CM

## 2023-04-19 DIAGNOSIS — I10 ESSENTIAL HYPERTENSION: ICD-10-CM

## 2023-04-19 DIAGNOSIS — E78.5 DYSLIPIDEMIA: ICD-10-CM

## 2023-04-19 RX ORDER — POTASSIUM CHLORIDE 20 MEQ/1
20 TABLET, EXTENDED RELEASE ORAL DAILY
Qty: 90 TABLET | Refills: 0 | Status: SHIPPED | OUTPATIENT
Start: 2023-04-19 | End: 2023-12-07 | Stop reason: SDUPTHER

## 2023-04-19 RX ORDER — ATORVASTATIN CALCIUM 10 MG/1
10 TABLET, FILM COATED ORAL NIGHTLY
Qty: 90 TABLET | Refills: 0 | Status: SHIPPED | OUTPATIENT
Start: 2023-04-19 | End: 2023-12-07 | Stop reason: SDUPTHER

## 2023-04-19 RX ORDER — LOSARTAN POTASSIUM AND HYDROCHLOROTHIAZIDE 12.5; 1 MG/1; MG/1
1 TABLET ORAL DAILY
Qty: 90 TABLET | Refills: 0 | Status: SHIPPED | OUTPATIENT
Start: 2023-04-19 | End: 2023-12-07 | Stop reason: SDUPTHER

## 2023-04-19 NOTE — TELEPHONE ENCOUNTER
Care Due:                  Date            Visit Type   Department     Provider  --------------------------------------------------------------------------------                                EP -                              PRIMARY      [facility] FAMILY  Last Visit: 12-      CARE ([facility])   MARGARITA Alvarado                               -                              [facility]  Next Visit: 06-      CARE ([facility])   MARGARITA Alvarado                                                            Last  Test          Frequency    Reason                     Performed    Due Date  --------------------------------------------------------------------------------    CBC.........  12 months..  fenofibrate..............  05- 05-    [facility] Embedded Care Gaps. Reference number: [id number]. 4/19/2023   3:32:26 PM CDT

## 2023-05-08 ENCOUNTER — OFFICE VISIT (OUTPATIENT)
Dept: FAMILY MEDICINE | Facility: CLINIC | Age: 68
End: 2023-05-08

## 2023-05-08 ENCOUNTER — HOSPITAL ENCOUNTER (OUTPATIENT)
Dept: RADIOLOGY | Facility: HOSPITAL | Age: 68
Discharge: HOME | End: 2023-05-08

## 2023-05-08 VITALS
DIASTOLIC BLOOD PRESSURE: 68 MMHG | WEIGHT: 251.31 LBS | HEIGHT: 67 IN | SYSTOLIC BLOOD PRESSURE: 136 MMHG | BODY MASS INDEX: 39.44 KG/M2

## 2023-05-08 DIAGNOSIS — R06.2 WHEEZING: ICD-10-CM

## 2023-05-08 DIAGNOSIS — I51.7 MILD CARDIOMEGALY: ICD-10-CM

## 2023-05-08 DIAGNOSIS — J40 BRONCHITIS: ICD-10-CM

## 2023-05-08 DIAGNOSIS — J40 BRONCHITIS: Primary | ICD-10-CM

## 2023-05-08 PROCEDURE — 99214 OFFICE O/P EST MOD 30 MIN: CPT | Mod: S$GLB,,,

## 2023-05-08 PROCEDURE — 71046 XR CHEST PA AND LATERAL: ICD-10-PCS | Mod: 26,,, | Performed by: RADIOLOGY

## 2023-05-08 PROCEDURE — 99999 PR PBB SHADOW E&M-EST. PATIENT-LVL IV: CPT | Mod: PBBFAC,,,

## 2023-05-08 PROCEDURE — 71046 X-RAY EXAM CHEST 2 VIEWS: CPT | Mod: TC,FY,PO

## 2023-05-08 PROCEDURE — 99214 PR OFFICE/OUTPT VISIT, EST, LEVL IV, 30-39 MIN: ICD-10-PCS | Mod: S$GLB,,,

## 2023-05-08 PROCEDURE — 71046 X-RAY EXAM CHEST 2 VIEWS: CPT | Mod: 26,,, | Performed by: RADIOLOGY

## 2023-05-08 PROCEDURE — 99999 PR PBB SHADOW E&M-EST. PATIENT-LVL IV: ICD-10-PCS | Mod: PBBFAC,,,

## 2023-05-08 RX ORDER — PROMETHAZINE HYDROCHLORIDE AND DEXTROMETHORPHAN HYDROBROMIDE 6.25; 15 MG/5ML; MG/5ML
5 SYRUP ORAL NIGHTLY PRN
Qty: 180 ML | Refills: 0 | Status: SHIPPED | OUTPATIENT
Start: 2023-05-08 | End: 2023-06-06

## 2023-05-08 RX ORDER — FLUTICASONE PROPIONATE 50 MCG
1 SPRAY, SUSPENSION (ML) NASAL DAILY
Qty: 9.9 ML | Refills: 3 | Status: SHIPPED | OUTPATIENT
Start: 2023-05-08 | End: 2023-08-04

## 2023-05-08 RX ORDER — METHYLPREDNISOLONE 4 MG/1
TABLET ORAL
Qty: 1 EACH | Refills: 0 | Status: SHIPPED | OUTPATIENT
Start: 2023-05-08 | End: 2023-06-06

## 2023-05-08 RX ORDER — ALBUTEROL SULFATE 90 UG/1
2 AEROSOL, METERED RESPIRATORY (INHALATION) EVERY 6 HOURS PRN
Qty: 6.7 G | Refills: 0 | Status: SHIPPED | OUTPATIENT
Start: 2023-05-08 | End: 2023-08-04

## 2023-05-08 NOTE — PATIENT INSTRUCTIONS
Patient Instructions   PLEASE READ YOUR DISCHARGE INSTRUCTIONS ENTIRELY AS IT CONTAINS IMPORTANT INFORMATION.     Bacterial infections can be treated with oral antibiotics, however, Viral upper respiratory infections will not respond to antibiotics but with simple symptomatic care, typically run their course in 10-14 days.     Please drink plenty of fluids.     Please get plenty of rest.     If the air in your home feels dry, use a cool mist humidifier. This can help a stuffy nose and make it easier to breathe.    You can also use saline nose drops to relieve stuffiness.    Please take an over the counter antihistamine medication (allegra/Claritin/Zyrtec) of your choice as directed.     It is safe to take Coricidin HBP for relief of sinus symptoms.     If not allergic, please take over the counter Tylenol (Acetaminophen) and/or Motrin (Ibuprofen) as directed for control of pain and/or fever.     Sore throat recommendations: Warm fluids, warm salt water gargles, throat lozenges, tea, honey, soup, rest, hydration.     Use over the counter flonase: one spray each nostril twice daily OR two sprays each nostril once daily.      If you were prescribed a cough syrup, do not drive or operate heavy equipment or machinery while taking these medications, as they can cause drowsiness.     If you  smoke, please stop smoking.     Please return here or go to the Emergency Department for any concerns or worsening of condition.

## 2023-05-09 ENCOUNTER — TELEPHONE (OUTPATIENT)
Dept: INTERNAL MEDICINE | Facility: CLINIC | Age: 68
End: 2023-05-09
Payer: COMMERCIAL

## 2023-05-09 NOTE — PROGRESS NOTES
Subjective:         Chief Complaint: Sore Throat     Darrian Velez is a 68 y.o. male, patient of Balwinder Alvarado MD  has a past medical history of ALLERGIC RHINITIS, colonic polyps (4/05/2012), Hyperlipidemia, Hypertension, MRSA infection, and Personal history of kidney stones.  unknown to me, presents today with complaints of Sore Throat    Mr. Colmenares presents to clinic with  complaints of persistent sore throat, post nasal drip, cough with phlegm production and wheezing. Symptoms started approximately a month ago. He completed E-visit with PCP on 04/04/23, was prescribed Z-pack, cetirizine and Mucinex. Patient reports compliance with meds, but symptoms persist. Cough is worse when lying flat and at night.  Reports subjective fever, denies chills, body aches, night sweats, orthopnea, chest pain, swollen glands, abdominal pain, nvd, or leg swelling.     Sore Throat   This is a recurrent problem. The current episode started more than 1 month ago. The problem has been waxing and waning. Maximum temperature: subjective. The pain is at a severity of 6/10. The pain is moderate. Associated symptoms include coughing. Pertinent negatives include no abdominal pain, congestion, diarrhea, drooling, ear discharge, ear pain, headaches, hoarse voice, plugged ear sensation, neck pain, shortness of breath, stridor, swollen glands, trouble swallowing or vomiting.   Cough  This is a recurrent problem. The current episode started more than 1 month ago. The problem has been waxing and waning. The problem occurs constantly. The cough is Productive of sputum. Associated symptoms include a fever (subjective), postnasal drip, a sore throat and wheezing. Pertinent negatives include no chest pain, chills, ear congestion, ear pain, headaches, heartburn, hemoptysis, myalgias, nasal congestion, rash, rhinorrhea, shortness of breath, sweats or weight loss. The symptoms are aggravated by lying down and pollens. He has tried OTC cough  suppressant and prescription cough suppressant for the symptoms. His past medical history is significant for environmental allergies.     Past Medical History:   Diagnosis Date    ALLERGIC RHINITIS     Hx of colonic polyps 2012    Tubular adenoma    Hyperlipidemia     Hypertension     MRSA infection     left medial thigh abscess & cellulitis    Personal history of kidney stones        Past Surgical History:   Procedure Laterality Date    COLONOSCOPY N/A 2020    Procedure: COLONOSCOPY;  Surgeon: Donato Shetty MD;  Location: [facility];  Service: Endoscopy;  Laterality: N/A;    HERNIA REPAIR      umbilical hernia repair with mesh    VASECTOMY         Family History   Problem Relation Age of Onset    Coronary artery disease Father          age 59    Heart disease Father     Breast cancer Mother     Hypertension Mother     Colon cancer Maternal Grandmother     Testicular cancer Brother        Social History     Socioeconomic History    Marital status:    Occupational History    Occupation:    Tobacco Use    Smoking status: Never    Smokeless tobacco: Never   Substance and Sexual Activity    Alcohol use: Not Currently     Comment: occasionally     Drug use: No       Review of Systems   Constitutional:  Positive for fever (subjective). Negative for chills and weight loss.   HENT:  Positive for postnasal drip and sore throat. Negative for congestion, drooling, ear discharge, ear pain, hoarse voice, rhinorrhea and trouble swallowing.    Respiratory:  Positive for cough and wheezing. Negative for hemoptysis, shortness of breath and stridor.    Cardiovascular:  Negative for chest pain.   Gastrointestinal:  Negative for abdominal pain, diarrhea, heartburn and vomiting.   Musculoskeletal:  Negative for myalgias and neck pain.   Skin:  Negative for rash.   Allergic/Immunologic: Positive for environmental allergies.   Neurological:  Negative for headaches.  "  Hematological:  Negative for adenopathy.   All other systems reviewed and are negative.      Objective:     Vitals:    05/08/23 1451   BP: 136/68   BP Location: Right arm   Patient Position: Sitting   BP Method: Medium (Manual)   Weight: 114 kg (251 lb 5.2 oz)   Height: 5' 7" (1.702 m)          Physical Exam  Vitals reviewed.   Constitutional:       General: He is not in acute distress.     Appearance: Normal appearance. He is not ill-appearing or toxic-appearing.   HENT:      Right Ear: Tympanic membrane normal.      Left Ear: Tympanic membrane normal.      Nose: Nose normal. No congestion or rhinorrhea.      Right Turbinates: Enlarged.      Left Turbinates: Enlarged.      Mouth/Throat:      Pharynx: Uvula midline. Oropharyngeal exudate and posterior oropharyngeal erythema present.      Tonsils: No tonsillar exudate.      Comments: Post nasal drip present  Eyes:      Pupils: Pupils are equal, round, and reactive to light.   Cardiovascular:      Rate and Rhythm: Normal rate and regular rhythm.      Pulses: Normal pulses.      Heart sounds: Normal heart sounds. No murmur heard.  Pulmonary:      Effort: No respiratory distress.      Breath sounds: Examination of the right-lower field reveals decreased breath sounds. Decreased breath sounds present. No wheezing, rhonchi or rales.   Abdominal:      General: Bowel sounds are normal.   Musculoskeletal:      Cervical back: Normal range of motion.      Right lower leg: No edema.      Left lower leg: No edema.   Skin:     General: Skin is warm and dry.   Neurological:      General: No focal deficit present.      Mental Status: He is alert and oriented to person, place, and time.   Psychiatric:         Mood and Affect: Mood normal.         Speech: Speech normal.             Assessment:         ICD-10-CM ICD-9-CM   1. Bronchitis  J40 490   2. Wheezing  R06.2 786.07   3. Mild cardiomegaly - seen in CXR 05/08/23  I51.7 429.3       Plan:       Bronchitis  -     " methylPREDNISolone (MEDROL DOSEPACK) 4 mg tablet; use as directed  Dispense: 1 each; Refill: 0  -     promethazine-dextromethorphan (PROMETHAZINE-DM) 6.25-15 mg/5 mL Syrp; Take 5 mLs by mouth nightly as needed (cough).  Dispense: 180 mL; Refill: 0  -     fluticasone propionate (FLONASE) 50 mcg/actuation nasal spray; 1 spray (50 mcg total) by Each Nostril route once daily.  Dispense: 9.9 mL; Refill: 3  -     X-Ray Chest PA And Lateral; Future; Expected date: 05/08/2023    Wheezing  -     albuterol (PROVENTIL/VENTOLIN HFA) 90 mcg/actuation inhaler; Inhale 2 puffs into the lungs every 6 (six) hours as needed for Wheezing.  Dispense: 6.7 g; Refill: 0    Mild cardiomegaly - seen in CXR 05/08/23  -     Echo; Future    -Patient counseled on medication mechanism of action, side effects, benefits, and risks. Patient verbalizes understanding.    -OTC antihistamine daily for allergies.  -Imaging ordered, incidental mild cardiomegaly seen, Echo ordered. If normal, follow up with PCP as scheduled. If abnormal, would consider Cardiology referral.  -Strict ER precautions given.Red flags discussed with patient in detail.Patient voiced understanding and in agreement with current treatment plan.       If symptoms worsen, go to ER.  If symptoms do not improve, return to clinic.   Keep appointments with all specialists.   Follow up if symptoms worsen or fail to improve.     Patient's Medications   New Prescriptions    ALBUTEROL (PROVENTIL/VENTOLIN HFA) 90 MCG/ACTUATION INHALER    Inhale 2 puffs into the lungs every 6 (six) hours as needed for Wheezing.    FLUTICASONE PROPIONATE (FLONASE) 50 MCG/ACTUATION NASAL SPRAY    1 spray (50 mcg total) by Each Nostril route once daily.    METHYLPREDNISOLONE (MEDROL DOSEPACK) 4 MG TABLET    use as directed    PROMETHAZINE-DEXTROMETHORPHAN (PROMETHAZINE-DM) 6.25-15 MG/5 ML SYRP    Take 5 mLs by mouth nightly as needed (cough).   Previous Medications    ASPIRIN (ASPIR-81 ORAL)        ATORVASTATIN  (LIPITOR) 10 MG TABLET    Take 1 tablet (10 mg total) by mouth every evening.    CETIRIZINE (ZYRTEC) 10 MG TABLET    Take 1 tablet (10 mg total) by mouth once daily. for 10 days    FENOFIBRATE MICRONIZED (LOFIBRA) 134 MG CAP    Take 1 capsule (134 mg total) by mouth daily with breakfast.    GARLIC 500 MG CAP    Take 1 capsule by mouth once daily.    LOSARTAN-HYDROCHLOROTHIAZIDE 100-12.5 MG (HYZAAR) 100-12.5 MG TAB    Take 1 tablet by mouth once daily.    MULTIVITAMIN (THERAGRAN) PER TABLET    Take 1 tablet by mouth once daily.      OMEGA-3 FATTY ACIDS/FISH OIL (OMEGA 3 FISH OIL ORAL)    Take by mouth. 1 Capsule By mouth Twice a day     POTASSIUM CHLORIDE SA (K-DUR,KLOR-CON) 20 MEQ TABLET    Take 1 tablet (20 mEq total) by mouth once daily.   Modified Medications    No medications on file   Discontinued Medications    No medications on file     I spent a total of 35 minutes on the day of the visit.This includes face to face time and non-face to face time preparing to see the patient (eg, review of tests), obtaining and/or reviewing separately obtained history, documenting clinical information in the electronic or other health record, independently interpreting results and communicating results to the patient/family/caregiver, or care coordinator.     Yanni Contreras NP

## 2023-05-15 ENCOUNTER — HOSPITAL ENCOUNTER (OUTPATIENT)
Dept: CARDIOLOGY | Facility: HOSPITAL | Age: 68
Discharge: HOME | End: 2023-05-15
Payer: COMMERCIAL

## 2023-05-15 VITALS — HEIGHT: 67 IN | WEIGHT: 251 LBS | BODY MASS INDEX: 39.39 KG/M2

## 2023-05-15 DIAGNOSIS — I51.7 MILD CARDIOMEGALY: ICD-10-CM

## 2023-05-15 LAB
ASCENDING AORTA: 3 CM
AV INDEX (PROSTH): 0.97
AV MEAN GRADIENT: 4 MMHG
AV PEAK GRADIENT: 6 MMHG
AV VALVE AREA: 3.3 CM2
AV VELOCITY RATIO: 1
BSA FOR ECHO PROCEDURE: 2.32 M2
CV ECHO LV RWT: 0.55 CM
DOP CALC AO PEAK VEL: 1.27 M/S
DOP CALC AO VTI: 29.4 CM
DOP CALC LVOT AREA: 3.4 CM2
DOP CALC LVOT DIAMETER: 2.08 CM
DOP CALC LVOT PEAK VEL: 1.27 M/S
DOP CALC LVOT STROKE VOLUME: 97.13 CM3
DOP CALC MV VTI: 28.3 CM
DOP CALCLVOT PEAK VEL VTI: 28.6 CM
E WAVE DECELERATION TIME: 227.16 MSEC
E/A RATIO: 1.38
E/E' RATIO: 14.24 M/S
ECHO LV POSTERIOR WALL: 1.12 CM (ref 0.6–1.1)
EJECTION FRACTION: 65 %
FRACTIONAL SHORTENING: 38 % (ref 28–44)
INTERVENTRICULAR SEPTUM: 1.17 CM (ref 0.6–1.1)
IVC DIAMETER: 1.94 CM
LA MAJOR: 4.65 CM
LA MINOR: 4.79 CM
LA WIDTH: 2.9 CM
LEFT ATRIUM SIZE: 3.61 CM
LEFT ATRIUM VOLUME INDEX MOD: 11.4 ML/M2
LEFT ATRIUM VOLUME INDEX: 18.8 ML/M2
LEFT ATRIUM VOLUME MOD: 25.45 CM3
LEFT ATRIUM VOLUME: 41.99 CM3
LEFT INTERNAL DIMENSION IN SYSTOLE: 2.53 CM (ref 2.1–4)
LEFT VENTRICLE DIASTOLIC VOLUME INDEX: 33.5 ML/M2
LEFT VENTRICLE DIASTOLIC VOLUME: 74.7 ML
LEFT VENTRICLE MASS INDEX: 72 G/M2
LEFT VENTRICLE SYSTOLIC VOLUME INDEX: 10.3 ML/M2
LEFT VENTRICLE SYSTOLIC VOLUME: 22.93 ML
LEFT VENTRICULAR INTERNAL DIMENSION IN DIASTOLE: 4.11 CM (ref 3.5–6)
LEFT VENTRICULAR MASS: 160.94 G
LV LATERAL E/E' RATIO: 13.44 M/S
LV SEPTAL E/E' RATIO: 15.13 M/S
LVOT MG: 3.3 MMHG
LVOT MV: 0.83 CM/S
MV A" WAVE DURATION": 102.76 MSEC
MV MEAN GRADIENT: 2 MMHG
MV PEAK A VEL: 0.88 M/S
MV PEAK E VEL: 1.21 M/S
MV PEAK GRADIENT: 6 MMHG
MV STENOSIS PRESSURE HALF TIME: 65.88 MS
MV VALVE AREA BY CONTINUITY EQUATION: 3.43 CM2
MV VALVE AREA P 1/2 METHOD: 3.34 CM2
OHS LV EJECTION FRACTION SIMPSONS BIPLANE MOD: 6 %
PISA TR MAX VEL: 2.15 M/S
PULM VEIN S/D RATIO: 1.37
PV MV: 0.78 M/S
PV PEAK D VEL: 0.41 M/S
PV PEAK S VEL: 0.56 M/S
PV PEAK VELOCITY: 0.97 CM/S
RA MAJOR: 4.16 CM
RA PRESSURE: 8 MMHG
RA WIDTH: 3.3 CM
RIGHT VENTRICULAR END-DIASTOLIC DIMENSION: 3.12 CM
RV TISSUE DOPPLER FREE WALL SYSTOLIC VELOCITY 1 (APICAL 4 CHAMBER VIEW): 0.02 CM/S
STJ: 2.93 CM
TDI LATERAL: 0.09 M/S
TDI SEPTAL: 0.08 M/S
TDI: 0.09 M/S
TR MAX PG: 18 MMHG
TV REST PULMONARY ARTERY PRESSURE: 26 MMHG

## 2023-05-15 PROCEDURE — 93306 ECHO (CUPID ONLY): ICD-10-PCS | Mod: 26,,, | Performed by: INTERNAL MEDICINE

## 2023-05-15 PROCEDURE — 93306 TTE W/DOPPLER COMPLETE: CPT | Mod: 26,,, | Performed by: INTERNAL MEDICINE

## 2023-05-15 PROCEDURE — 93306 TTE W/DOPPLER COMPLETE: CPT

## 2023-06-02 ENCOUNTER — LAB VISIT (OUTPATIENT)
Dept: LAB | Facility: HOSPITAL | Age: 68
End: 2023-06-02
Attending: FAMILY MEDICINE
Payer: COMMERCIAL

## 2023-06-02 DIAGNOSIS — I10 PRIMARY HYPERTENSION: ICD-10-CM

## 2023-06-02 LAB
BILIRUB UR QL STRIP: NEGATIVE
CLARITY UR REFRACT.AUTO: CLEAR
COLOR UR AUTO: YELLOW
GLUCOSE UR QL STRIP: NEGATIVE
HGB UR QL STRIP: NEGATIVE
KETONES UR QL STRIP: NEGATIVE
LEUKOCYTE ESTERASE UR QL STRIP: NEGATIVE
NITRITE UR QL STRIP: NEGATIVE
PH UR STRIP: 6 [PH] (ref 5–8)
PROT UR QL STRIP: NEGATIVE
SP GR UR STRIP: 1.01 (ref 1–1.03)
URN SPEC COLLECT METH UR: NORMAL

## 2023-06-02 PROCEDURE — 81003 URINALYSIS AUTO W/O SCOPE: CPT | Performed by: FAMILY MEDICINE

## 2023-06-06 ENCOUNTER — OFFICE VISIT (OUTPATIENT)
Dept: FAMILY MEDICINE | Facility: CLINIC | Age: 68
End: 2023-06-06
Payer: COMMERCIAL

## 2023-06-06 VITALS
HEART RATE: 82 BPM | DIASTOLIC BLOOD PRESSURE: 72 MMHG | BODY MASS INDEX: 39.58 KG/M2 | OXYGEN SATURATION: 98 % | SYSTOLIC BLOOD PRESSURE: 112 MMHG | HEIGHT: 67 IN | WEIGHT: 252.19 LBS

## 2023-06-06 DIAGNOSIS — B35.4 TINEA CORPORIS: ICD-10-CM

## 2023-06-06 DIAGNOSIS — I10 PRIMARY HYPERTENSION: Primary | ICD-10-CM

## 2023-06-06 DIAGNOSIS — R73.03 PREDIABETES: ICD-10-CM

## 2023-06-06 DIAGNOSIS — E66.01 SEVERE OBESITY (BMI 35.0-39.9) WITH COMORBIDITY: ICD-10-CM

## 2023-06-06 DIAGNOSIS — E78.1 HYPERTRIGLYCERIDEMIA: ICD-10-CM

## 2023-06-06 DIAGNOSIS — Z12.5 SCREENING FOR PROSTATE CANCER: ICD-10-CM

## 2023-06-06 PROCEDURE — 99215 PR OFFICE/OUTPT VISIT, EST, LEVL V, 40-54 MIN: ICD-10-PCS | Mod: S$GLB,,, | Performed by: FAMILY MEDICINE

## 2023-06-06 PROCEDURE — 99215 OFFICE O/P EST HI 40 MIN: CPT | Mod: S$GLB,,, | Performed by: FAMILY MEDICINE

## 2023-06-06 PROCEDURE — 99999 PR PBB SHADOW E&M-EST. PATIENT-LVL IV: ICD-10-PCS | Mod: PBBFAC,,, | Performed by: FAMILY MEDICINE

## 2023-06-06 PROCEDURE — 99999 PR PBB SHADOW E&M-EST. PATIENT-LVL IV: CPT | Mod: PBBFAC,,, | Performed by: FAMILY MEDICINE

## 2023-06-06 RX ORDER — CLOTRIMAZOLE AND BETAMETHASONE DIPROPIONATE 10; .64 MG/G; MG/G
CREAM TOPICAL 2 TIMES DAILY
Qty: 45 G | Refills: 0 | Status: SHIPPED | OUTPATIENT
Start: 2023-06-06 | End: 2023-06-16

## 2023-06-06 NOTE — PROGRESS NOTES
Subjective     Patient ID: Darrian Velez is a 68 y.o. male.    Chief Complaint: Hypertension    68 years old came to the clinic for blood pressure check.  Blood pressure today was stable.  No chest pain, palpitation, orthopnea or PND.  Patient with elevated triglycerides.  Last A1c at the level of prediabetes.  Patient with a BMI of 39 currently trying to lose weight.  He reports rash over the right arm.  Patient due for PSA screening.    Hypertension  Pertinent negatives include no chest pain or palpitations.   Review of Systems   Constitutional: Negative.    HENT: Negative.     Eyes: Negative.    Respiratory: Negative.     Cardiovascular: Negative.  Negative for chest pain, palpitations, leg swelling and claudication.   Gastrointestinal: Negative.    Endocrine: Negative for polydipsia, polyphagia and polyuria.   Genitourinary: Negative.    Musculoskeletal: Negative.    Integumentary:  Positive for rash.   Neurological: Negative.    Psychiatric/Behavioral: Negative.          Objective     Physical Exam  Vitals and nursing note reviewed.   Constitutional:       General: He is not in acute distress.     Appearance: He is well-developed. He is not diaphoretic.   HENT:      Head: Normocephalic and atraumatic.      Right Ear: External ear normal.      Left Ear: External ear normal.      Nose: Nose normal.      Mouth/Throat:      Pharynx: No oropharyngeal exudate.   Eyes:      General: No scleral icterus.        Right eye: No discharge.         Left eye: No discharge.      Conjunctiva/sclera: Conjunctivae normal.      Pupils: Pupils are equal, round, and reactive to light.   Neck:      Thyroid: No thyromegaly.      Vascular: No JVD.      Trachea: No tracheal deviation.   Cardiovascular:      Rate and Rhythm: Normal rate and regular rhythm.      Heart sounds: Normal heart sounds. No murmur heard.    No friction rub. No gallop.   Pulmonary:      Effort: Pulmonary effort is normal. No respiratory distress.      Breath  sounds: Normal breath sounds. No stridor. No wheezing or rales.   Chest:      Chest wall: No tenderness.   Abdominal:      General: Bowel sounds are normal. There is no distension.      Palpations: Abdomen is soft. There is no mass.      Tenderness: There is no abdominal tenderness. There is no guarding or rebound.   Musculoskeletal:         General: No tenderness. Normal range of motion.      Cervical back: Normal range of motion and neck supple.   Lymphadenopathy:      Cervical: No cervical adenopathy.   Skin:     General: Skin is warm and dry.      Coloration: Skin is not pale.      Findings: Rash present. No erythema. Rash is macular and papular.   Neurological:      Mental Status: He is alert and oriented to person, place, and time.      Cranial Nerves: No cranial nerve deficit.      Motor: No abnormal muscle tone.      Coordination: Coordination normal.      Deep Tendon Reflexes: Reflexes are normal and symmetric. Reflexes normal.   Psychiatric:         Behavior: Behavior normal.         Thought Content: Thought content normal.         Judgment: Judgment normal.          Assessment and Plan     1. Primary hypertension  -     Urinalysis; Future  -     Comprehensive Metabolic Panel; Future; Expected date: 06/06/2023  -     Lipid Panel; Future; Expected date: 06/06/2023  -     CBC Auto Differential; Future; Expected date: 06/06/2023    2. Hypertriglyceridemia  -     Comprehensive Metabolic Panel; Future; Expected date: 06/06/2023  -     Lipid Panel; Future; Expected date: 06/06/2023    3. Prediabetes  -     Comprehensive Metabolic Panel; Future; Expected date: 06/06/2023  -     Lipid Panel; Future; Expected date: 06/06/2023  -     Hemoglobin A1C; Future; Expected date: 06/06/2023    4. Severe obesity (BMI 35.0-39.9) with comorbidity  -     Lipid Panel; Future; Expected date: 06/06/2023    5. Screening for prostate cancer  -     PSA, Screening; Future; Expected date: 06/06/2023    6. Tinea corporis  -      clotrimazole-betamethasone 1-0.05% (LOTRISONE) cream; Apply topically 2 (two) times daily. for 10 days  Dispense: 45 g; Refill: 0      Darrian was seen today for hypertension.    Diagnoses and all orders for this visit:    Primary hypertension  -     Urinalysis; Future  -     Comprehensive Metabolic Panel; Future  -     Lipid Panel; Future  -     CBC Auto Differential; Future    Hypertriglyceridemia  -     Comprehensive Metabolic Panel; Future  -     Lipid Panel; Future    Prediabetes  -     Comprehensive Metabolic Panel; Future  -     Lipid Panel; Future  -     Hemoglobin A1C; Future    Severe obesity (BMI 35.0-39.9) with comorbidity  -     Lipid Panel; Future    Screening for prostate cancer  -     PSA, Screening; Future    Tinea corporis  -     clotrimazole-betamethasone 1-0.05% (LOTRISONE) cream; Apply topically 2 (two) times daily. for 10 days                Follow up in about 6 months (around 12/6/2023), or if symptoms worsen or fail to improve.

## 2023-07-29 ENCOUNTER — HOSPITAL ENCOUNTER (EMERGENCY)
Facility: HOSPITAL | Age: 68
Discharge: OTHER INSTITUTION | End: 2023-07-29
Attending: EMERGENCY MEDICINE
Payer: COMMERCIAL

## 2023-07-29 ENCOUNTER — HOSPITAL ENCOUNTER (EMERGENCY)
Facility: HOSPITAL | Age: 68
Discharge: HOME | End: 2023-07-30
Attending: EMERGENCY MEDICINE
Payer: COMMERCIAL

## 2023-07-29 VITALS
SYSTOLIC BLOOD PRESSURE: 129 MMHG | RESPIRATION RATE: 17 BRPM | HEART RATE: 81 BPM | OXYGEN SATURATION: 96 % | BODY MASS INDEX: 39.36 KG/M2 | DIASTOLIC BLOOD PRESSURE: 67 MMHG | WEIGHT: 251.31 LBS | TEMPERATURE: 99 F

## 2023-07-29 DIAGNOSIS — S52.125A CLOSED NONDISPLACED FRACTURE OF HEAD OF LEFT RADIUS, INITIAL ENCOUNTER: Primary | ICD-10-CM
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
## 2023-07-29 DIAGNOSIS — S52.092A OTHER CLOSED FRACTURE OF PROXIMAL END OF LEFT ULNA, INITIAL ENCOUNTER: ICD-10-CM

## 2023-07-29 DIAGNOSIS — S52.181A OTHER CLOSED FRACTURE OF PROXIMAL END OF RIGHT RADIUS, INITIAL ENCOUNTER: ICD-10-CM

## 2023-07-29 DIAGNOSIS — S52.001A CLOSED FRACTURE OF PROXIMAL END OF RIGHT ULNA, UNSPECIFIED FRACTURE MORPHOLOGY, INITIAL ENCOUNTER: ICD-10-CM

## 2023-07-29 DIAGNOSIS — T14.8XXA FRACTURE: Primary | ICD-10-CM

## 2023-07-29 LAB
ANION GAP SERPL CALC-SCNC: 13 MMOL/L (ref 8–16)
BASOPHILS # BLD AUTO: 0.05 K/UL (ref 0–0.2)
BASOPHILS NFR BLD: 0.5 % (ref 0–1.9)
BUN SERPL-MCNC: 14 MG/DL (ref 8–23)
CALCIUM SERPL-MCNC: 10.2 MG/DL (ref 8.7–10.5)
CHLORIDE SERPL-SCNC: 106 MMOL/L (ref 95–110)
CO2 SERPL-SCNC: 20 MMOL/L (ref 23–29)
CREAT SERPL-MCNC: 1 MG/DL (ref 0.5–1.4)
DIFFERENTIAL METHOD: ABNORMAL
EOSINOPHIL # BLD AUTO: 0.1 K/UL (ref 0–0.5)
EOSINOPHIL NFR BLD: 0.6 % (ref 0–8)
ERYTHROCYTE [DISTWIDTH] IN BLOOD BY AUTOMATED COUNT: 12.8 % (ref 11.5–14.5)
EST. GFR  (NO RACE VARIABLE): >60 ML/MIN/1.73 M^2
GLUCOSE SERPL-MCNC: 131 MG/DL (ref 70–110)
HCT VFR BLD AUTO: 45.5 % (ref 40–54)
HGB BLD-MCNC: 16 G/DL (ref 14–18)
IMM GRANULOCYTES # BLD AUTO: 0.05 K/UL (ref 0–0.04)
IMM GRANULOCYTES NFR BLD AUTO: 0.5 % (ref 0–0.5)
LYMPHOCYTES # BLD AUTO: 1.2 K/UL (ref 1–4.8)
LYMPHOCYTES NFR BLD: 11.2 % (ref 18–48)
MCH RBC QN AUTO: 30.9 PG (ref 27–31)
MCHC RBC AUTO-ENTMCNC: 35.2 G/DL (ref 32–36)
MCV RBC AUTO: 88 FL (ref 82–98)
MONOCYTES # BLD AUTO: 1.1 K/UL (ref 0.3–1)
MONOCYTES NFR BLD: 9.6 % (ref 4–15)
NEUTROPHILS # BLD AUTO: 8.5 K/UL (ref 1.8–7.7)
NEUTROPHILS NFR BLD: 77.6 % (ref 38–73)
NRBC BLD-RTO: 0 /100 WBC
PLATELET # BLD AUTO: 217 K/UL (ref 150–450)
PMV BLD AUTO: 11.1 FL (ref 9.2–12.9)
POTASSIUM SERPL-SCNC: 4.1 MMOL/L (ref 3.5–5.1)
RBC # BLD AUTO: 5.18 M/UL (ref 4.6–6.2)
SODIUM SERPL-SCNC: 139 MMOL/L (ref 136–145)
WBC # BLD AUTO: 10.9 K/UL (ref 3.9–12.7)

## 2023-07-29 PROCEDURE — 96372 THER/PROPH/DIAG INJ SC/IM: CPT | Mod: 59 | Performed by: EMERGENCY MEDICINE

## 2023-07-29 PROCEDURE — 63600175 PHARM REV CODE 636 W HCPCS: Performed by: EMERGENCY MEDICINE

## 2023-07-29 PROCEDURE — 85025 COMPLETE CBC W/AUTO DIFF WBC: CPT | Performed by: EMERGENCY MEDICINE

## 2023-07-29 PROCEDURE — 80048 BASIC METABOLIC PNL TOTAL CA: CPT | Performed by: EMERGENCY MEDICINE

## 2023-07-29 PROCEDURE — 99285 EMERGENCY DEPT VISIT HI MDM: CPT | Mod: 25

## 2023-07-29 PROCEDURE — 96374 THER/PROPH/DIAG INJ IV PUSH: CPT

## 2023-07-29 PROCEDURE — 25000003 PHARM REV CODE 250: Performed by: EMERGENCY MEDICINE

## 2023-07-29 PROCEDURE — 63600175 PHARM REV CODE 636 W HCPCS: Performed by: STUDENT IN AN ORGANIZED HEALTH CARE EDUCATION/TRAINING PROGRAM

## 2023-07-29 PROCEDURE — 96375 TX/PRO/DX INJ NEW DRUG ADDON: CPT

## 2023-07-29 PROCEDURE — 29105 APPLICATION LONG ARM SPLINT: CPT | Mod: LT

## 2023-07-29 RX ORDER — ONDANSETRON 2 MG/ML
4 INJECTION INTRAMUSCULAR; INTRAVENOUS
Status: COMPLETED | OUTPATIENT
Start: 2023-07-29 | End: 2023-07-29

## 2023-07-29 RX ORDER — MORPHINE SULFATE 4 MG/ML
4 INJECTION, SOLUTION INTRAMUSCULAR; INTRAVENOUS
Status: COMPLETED | OUTPATIENT
Start: 2023-07-29 | End: 2023-07-29

## 2023-07-29 RX ORDER — ACETAMINOPHEN 500 MG
1000 TABLET ORAL
Status: COMPLETED | OUTPATIENT
Start: 2023-07-29 | End: 2023-07-29

## 2023-07-29 RX ORDER — KETOROLAC TROMETHAMINE 30 MG/ML
15 INJECTION, SOLUTION INTRAMUSCULAR; INTRAVENOUS
Status: COMPLETED | OUTPATIENT
Start: 2023-07-29 | End: 2023-07-29

## 2023-07-29 RX ORDER — HYDROMORPHONE HYDROCHLORIDE 1 MG/ML
0.5 INJECTION, SOLUTION INTRAMUSCULAR; INTRAVENOUS; SUBCUTANEOUS
Status: COMPLETED | OUTPATIENT
Start: 2023-07-29 | End: 2023-07-29

## 2023-07-29 RX ADMIN — MORPHINE SULFATE 4 MG: 4 INJECTION INTRAVENOUS at 07:07

## 2023-07-29 RX ADMIN — ONDANSETRON HYDROCHLORIDE 4 MG: 2 SOLUTION INTRAMUSCULAR; INTRAVENOUS at 07:07

## 2023-07-29 RX ADMIN — KETOROLAC TROMETHAMINE 15 MG: 30 INJECTION, SOLUTION INTRAMUSCULAR; INTRAVENOUS at 06:07

## 2023-07-29 RX ADMIN — ACETAMINOPHEN 1000 MG: 500 TABLET ORAL at 06:07

## 2023-07-29 RX ADMIN — HYDROMORPHONE HYDROCHLORIDE 0.5 MG: 1 INJECTION, SOLUTION INTRAMUSCULAR; INTRAVENOUS; SUBCUTANEOUS at 10:07

## 2023-07-29 NOTE — ED PROVIDER NOTES
History     CHIEF COMPLAINT:  left arm pain       HPI:  Darrian Velez is a 68 y.o. male  who  has a past medical history of ALLERGIC RHINITIS, colonic polyps (4/05/2012), Hyperlipidemia, Hypertension, MRSA infection, and Personal history of kidney stones. who presents to the emergency department today with a complaint of left arm pain.  Patient reports that he tripped and fell directly onto his left arm.  He is had pain ever since.  Pain is distal to the elbow but proximal to the wrist.  No pain to the shoulder clavicles.  Started with some swelling as well.  Patient has slight numbness to the left hand.  He did not hit his head.  He has no other complaints of pain anywhere else.      Darrian Velez is  right handed       ROS    Constitutional: No fever, no chills.  Eyes: No discharge. No pain.  HENT: No nasal drainage. No ear ache. No sore throat.  Cardiovascular: No chest pain, no palpitations.  Respiratory: No cough, no shortness of breath.  Gastrointestinal: No abdominal pain, no vomiting. No diarrhea.  Genitourinary: No hematuria, dysuria, urgency.  Skin: No rashes, no lesions.  Neurological: No headache, no focal weakness.    Otherwise remaining ROS negative     The history is provided by the patient      ALLERGIES REVIEWED  MEDICATIONS REVIEWED        Reviewed and verified by myself:   PMH/PSH/SOC/FH REVIEWED       Past Medical History:   Diagnosis Date    ALLERGIC RHINITIS     Hx of colonic polyps 4/05/2012    Tubular adenoma    Hyperlipidemia     Hypertension     MRSA infection     left medial thigh abscess & cellulitis    Personal history of kidney stones        Past Surgical History:   Procedure Laterality Date    COLONOSCOPY N/A 7/22/2020    Procedure: COLONOSCOPY;  Surgeon: Donato Shetty MD;  Location: [facility];  Service: Endoscopy;  Laterality: N/A;    HERNIA REPAIR      umbilical hernia repair with mesh    VASECTOMY         Social History     Socioeconomic History    Marital  status:    Occupational History    Occupation:    Tobacco Use    Smoking status: Never    Smokeless tobacco: Never   Substance and Sexual Activity    Alcohol use: Not Currently     Comment: occasionally     Drug use: No       Family History   Problem Relation Age of Onset    Coronary artery disease Father          age 59    Heart disease Father     Breast cancer Mother     Hypertension Mother     Colon cancer Maternal Grandmother     Testicular cancer Brother            Nursing/Ancillary staff note reviewed.  VS reviewed    /67 (BP Location: Right arm, Patient Position: Lying)   Pulse 81   Temp 98.7 °F (37.1 °C) (Oral)   Resp 17   Wt 114 kg (251 lb 5.2 oz)   SpO2 96%   BMI 39.36 kg/m²       Physical Exam  Musculoskeletal:      Right shoulder: Normal.      Left shoulder: Normal.      Right upper arm: Normal.      Left upper arm: Normal.      Right elbow: Normal.      Left elbow: No swelling. Decreased range of motion (Secondary to pain). No tenderness.      Right forearm: Normal.      Left forearm: Swelling, deformity and bony tenderness present.      Right wrist: Normal.      Left wrist: No bony tenderness, snuff box tenderness or crepitus. Normal pulse.      Right hand: Normal.      Left hand: No bony tenderness. Decreased range of motion (Secondary to pain). Normal sensation. Normal capillary refill. Normal pulse.        Arms:       Comments: Sensation intact to light touch in the left upper extremity and hand.  2+ radial pulses.  Capillary refill appropriate.    No acute abnormality to the lower extremities.  Full range motion without any difficulty.     Constitutional: Alert and oriented to person, place, and time. Appears well-developed and well-nourished. No distress.   HENT:   Head: Normocephalic and atraumatic.   Right Ear: External ear normal.   Left Ear: External ear normal.   Nose: Nose normal.   Eyes: Conjunctivae and EOM are normal.   Neck: Neck  supple. No midline tenderness.   Cardiovascular: Normal rate and intact distal pulses.    Pulmonary/Chest: Effort normal and breath sounds normal. No stridor.   Neurological: Alert and oriented to person, place, and time. No cranial nerve deficit. Exhibits normal muscle tone.   Skin: Skin is warm and dry.   Psychiatric: Normal mood and affect.   Nursing note and vitals reviewed.      ED Course       1845 Toradol and Tylenol will be given for pain.    1940 pain is returning.  Will give him morphine.    2000 patient care turned over to Dr. Jaden mendoza ED physician at shift change.  We are awaiting labs and a bloating of imaging to [facility], awaiting radiology read.  From my visualization of x-ray at bedside anticipate transfer to facility with orthopedics for orthopedic consultation and possible surgical interventions.       ED Course as of 07/31/23 0838   Sat Jul 29, 2023 2008 mildly displaced intra-articular fracture of the radial head.  moderately comminuted, mildly displaced, and mildly angulated fracture of the proximal ulna [AS]   2108 CBC without significant leukocytosis, anemia (at baseline), or platelet abnormalities.   Chem 14 negative for hypo-or hyper natremia, kalemia , chloridemia, or other electrolyte abnormalities; BUN and creatinine (at baseline),  [AS]   2229 Spoke with Dr Santosh Zavala (ortho) who recommends transfer to [facility] ED to ED for evaluation.  Discussed plan with patient, patient elected to go to the emergency department POV.  Discussed risks and benefits of transfer via POV [AS]      ED Course User Index  [AS] Stefanie Perez MD       Medical Decision Making    Differential Diagnosis:   Strain, sprain, fracture, dislocation    Initial Assessment:   Initial management:  Patient presents to the emergency department today with pain to his left proximal forearm after a fall .  Has tenderness on exam decreased range of motion on exam.  Will obtain x-rays, treat their pain,  monitor and reassess.    Problems Addressed:  Closed nondisplaced fracture of head of left radius, initial encounter: complicated acute illness or injury  Fall: complicated acute illness or injury  Other closed fracture of proximal end of left ulna, initial encounter: complicated acute illness or injury    Amount and/or Complexity of Data Reviewed  External Data Reviewed: notes.     Details: Seen 06/06/2023 by his primary care physician for follow-up  Labs: ordered.  Radiology: ordered and independent interpretation performed.     Details: X-ray reviewed by myself at bedside There is a displaced intra-articular fracture of the radial head, there is also a fracture of the proximal ulna, final read will be made by Radiology  Discussion of management or test interpretation with external provider(s): Patient care was turned over to Dr. Chris boone change awaiting xray, anticipate transfer to another facility for ortho consultation.     Risk  OTC drugs.  Prescription drug management.            ED Management:    Medications   ketorolac injection 15 mg (15 mg Intramuscular Given 7/29/23 1852)   acetaminophen tablet 1,000 mg (1,000 mg Oral Given 7/29/23 1853)   morphine injection 4 mg (4 mg Intravenous Given 7/29/23 1956)   ondansetron injection 4 mg (4 mg Intravenous Given 7/1957)   HYDROmorphone injection 0.5 mg (0.5 mg Intravenous Given 7/29/23 2248)                     Impression        The primary encounter diagnosis was Closed nondisplaced fracture of head of left radius, initial encounter. Diagnoses of Fall and Other closed fracture of proximal end of left ulna, initial encounter were also pertinent to this visit.                   Jay Wright MD  07/31/23 2954

## 2023-07-30 VITALS
BODY MASS INDEX: 39 KG/M2 | RESPIRATION RATE: 18 BRPM | HEART RATE: 74 BPM | SYSTOLIC BLOOD PRESSURE: 131 MMHG | WEIGHT: 249 LBS | OXYGEN SATURATION: 95 % | DIASTOLIC BLOOD PRESSURE: 67 MMHG | TEMPERATURE: 98 F

## 2023-07-30 PROBLEM — S52.102A CLOSED FRACTURE OF LEFT PROXIMAL RADIUS AND ULNA: Status: ACTIVE | Noted: 2023-07-30

## 2023-07-30 PROBLEM — S52.002A CLOSED FRACTURE OF LEFT PROXIMAL RADIUS AND ULNA: Status: ACTIVE | Noted: 2023-07-30

## 2023-07-30 PROCEDURE — 63600175 PHARM REV CODE 636 W HCPCS

## 2023-07-30 PROCEDURE — 29105 APPLICATION LONG ARM SPLINT: CPT | Mod: LT

## 2023-07-30 PROCEDURE — 99285 EMERGENCY DEPT VISIT HI MDM: CPT | Mod: 25

## 2023-07-30 PROCEDURE — 63600175 PHARM REV CODE 636 W HCPCS: Performed by: EMERGENCY MEDICINE

## 2023-07-30 PROCEDURE — 96374 THER/PROPH/DIAG INJ IV PUSH: CPT

## 2023-07-30 PROCEDURE — 96376 TX/PRO/DX INJ SAME DRUG ADON: CPT

## 2023-07-30 PROCEDURE — 25000003 PHARM REV CODE 250: Performed by: EMERGENCY MEDICINE

## 2023-07-30 RX ORDER — OXYCODONE AND ACETAMINOPHEN 5; 325 MG/1; MG/1
1 TABLET ORAL
Status: COMPLETED | OUTPATIENT
Start: 2023-07-30 | End: 2023-07-30

## 2023-07-30 RX ORDER — HYDROMORPHONE HYDROCHLORIDE 1 MG/ML
1 INJECTION, SOLUTION INTRAMUSCULAR; INTRAVENOUS; SUBCUTANEOUS
Status: COMPLETED | OUTPATIENT
Start: 2023-07-30 | End: 2023-07-30

## 2023-07-30 RX ORDER — OXYCODONE AND ACETAMINOPHEN 5; 325 MG/1; MG/1
1 TABLET ORAL EVERY 6 HOURS PRN
Qty: 20 TABLET | Refills: 0 | OUTPATIENT
Start: 2023-07-30 | End: 2023-07-31

## 2023-07-30 RX ORDER — METHOCARBAMOL 500 MG/1
1000 TABLET, FILM COATED ORAL 3 TIMES DAILY
Qty: 42 TABLET | Refills: 0 | Status: SHIPPED | OUTPATIENT
Start: 2023-07-30 | End: 2023-08-07

## 2023-07-30 RX ORDER — METHOCARBAMOL 500 MG/1
1000 TABLET, FILM COATED ORAL
Status: COMPLETED | OUTPATIENT
Start: 2023-07-30 | End: 2023-07-30

## 2023-07-30 RX ADMIN — HYDROMORPHONE HYDROCHLORIDE 1 MG: 1 INJECTION, SOLUTION INTRAMUSCULAR; INTRAVENOUS; SUBCUTANEOUS at 12:07

## 2023-07-30 RX ADMIN — OXYCODONE HYDROCHLORIDE AND ACETAMINOPHEN 1 TABLET: 5; 325 TABLET ORAL at 05:07

## 2023-07-30 RX ADMIN — METHOCARBAMOL 1000 MG: 500 TABLET ORAL at 05:07

## 2023-07-30 RX ADMIN — HYDROMORPHONE HYDROCHLORIDE 1 MG: 1 INJECTION, SOLUTION INTRAMUSCULAR; INTRAVENOUS; SUBCUTANEOUS at 02:07

## 2023-07-30 NOTE — HPI
Patient is a right-hand dominant 68-year-old male past medical history hypertension, hyperlipidemia, presenting with right forearm pain after a fall from standing earlier this morning.  Patient states that he tripped while at home falling directly onto his left elbow with immediate pain and swelling at the proximal forearm.  Endorses numbness and tingling at the tips of all digits.  States that he is never injured this extremity before and denies any other musculoskeletal pains at this time.  Lives at home with his wife, does not smoke, does not drink and denies IV drug use.  Not on any blood thinners.

## 2023-07-30 NOTE — ED PROVIDER NOTES
Encounter Date: 2023       History     Chief Complaint   Patient presents with    Arm Injury     Transfer from [place] for fractured arm.     68 y.o. Male presents as transfer from [facility] ED for surgical evaluation for fracture. The patient reports tripping and falling directly onto his L arm. He currently has 10/10 pain, tingling, and swelling of the digits. He denies numbness at this time. Pain is distal to the elbow and proximal to the wrist. He denies hitting his head and loosing consciousness. The patient denies any other complaints at this time.    The history is provided by the patient.     Review of patient's allergies indicates:   Allergen Reactions    No known allergies      Past Medical History:   Diagnosis Date    ALLERGIC RHINITIS     Hx of colonic polyps 2012    Tubular adenoma    Hyperlipidemia     Hypertension     MRSA infection     left medial thigh abscess & cellulitis    Personal history of kidney stones      Past Surgical History:   Procedure Laterality Date    COLONOSCOPY N/A 2020    Procedure: COLONOSCOPY;  Surgeon: Donato Shetty MD;  Location: [facility];  Service: Endoscopy;  Laterality: N/A;    HERNIA REPAIR      umbilical hernia repair with mesh    VASECTOMY       Family History   Problem Relation Age of Onset    Coronary artery disease Father          age 59    Heart disease Father     Breast cancer Mother     Hypertension Mother     Colon cancer Maternal Grandmother     Testicular cancer Brother      Social History     Tobacco Use    Smoking status: Never    Smokeless tobacco: Never   Substance Use Topics    Alcohol use: Not Currently     Comment: occasionally     Drug use: No     Review of Systems   Constitutional:  Negative for fever.   HENT:  Negative for sore throat.    Respiratory:  Negative for shortness of breath.    Cardiovascular:  Negative for chest pain.   Gastrointestinal:  Negative for abdominal pain, nausea and vomiting.    Genitourinary:  Negative for dysuria.   Musculoskeletal:  Positive for arthralgias and myalgias. Negative for back pain.   Skin:  Negative for rash.   Neurological:  Negative for weakness.   Hematological:  Does not bruise/bleed easily.       Physical Exam     Initial Vitals [07/29/23 2343]   BP Pulse Resp Temp SpO2   (!) 147/79 78 16 98.3 °F (36.8 °C) 97 %      MAP       --         Physical Exam    Constitutional: He appears well-developed and well-nourished. He is not diaphoretic. No distress.   HENT:   Head: Normocephalic and atraumatic.   Eyes: Right eye exhibits no discharge. Left eye exhibits no discharge. No scleral icterus.   Neck:   Normal range of motion.  Cardiovascular:  Normal rate, regular rhythm, normal heart sounds and intact distal pulses.           Pulmonary/Chest: Breath sounds normal. No respiratory distress.   Abdominal: Abdomen is soft. There is no abdominal tenderness.   Musculoskeletal:         General: Tenderness (L forearm) and edema (L digits) present.      Cervical back: Normal range of motion.      Comments: Exam of the left arm limited 2/2 to splint. Digits are swollen but soft, warm, and have normal color     Neurological: He is alert and oriented to person, place, and time.   Skin: Skin is warm and dry.   Psychiatric: He has a normal mood and affect.         ED Course   Procedures  Labs Reviewed - No data to display         Imaging Results              CT Arm Elbow Without Contrast Left (Final result)  Result time 07/30/23 03:18:31      Final result by Urban Leon MD (07/30/23 03:18:31)                   Impression:      Acute fractures of proximal radius and ulna, as above.    Electronically signed by resident: Shun Dash  Date:    07/30/2023  Time:    03:00    Electronically signed by: Urban Leon MD  Date:    07/30/2023  Time:    03:18               Narrative:    EXAMINATION:  CT ARM ELBOW WITHOUT CONTRAST LEFT; CT 3D RECON WITH INDEPENDENT WS    CLINICAL  HISTORY:  2mm cuts with 3d recons;; 2mm cuts with 3d recons;fx;    TECHNIQUE:  Axial images of the left elbow was obtained.  Coronal, sagittal, and 3D reconstructions were performed.    COMPARISON:  Radiograph 07/29/2023    FINDINGS:  Radial head intra-articular fracture with mild displacement anterior fragment.  Nutrient canal proximal radial shaft.  Displaced comminuted proximal ulnar fracture with intra-articular extension to the coronoid.  No dislocation.    Mild muscular and soft tissue edema.  Joint effusion with fat pad elevation.                                       CT 3D RECON WITH INDEPENDENT WS (Final result)  Result time 07/30/23 03:18:31      Final result by Urban Leon MD (07/30/23 03:18:31)                   Impression:      Acute fractures of proximal radius and ulna, as above.    Electronically signed by resident: Shun Dash  Date:    07/30/2023  Time:    03:00    Electronically signed by: Urban Leon MD  Date:    07/30/2023  Time:    03:18               Narrative:    EXAMINATION:  CT ARM ELBOW WITHOUT CONTRAST LEFT; CT 3D RECON WITH INDEPENDENT WS    CLINICAL HISTORY:  2mm cuts with 3d recons;; 2mm cuts with 3d recons;fx;    TECHNIQUE:  Axial images of the left elbow was obtained.  Coronal, sagittal, and 3D reconstructions were performed.    COMPARISON:  Radiograph 07/29/2023    FINDINGS:  Radial head intra-articular fracture with mild displacement anterior fragment.  Nutrient canal proximal radial shaft.  Displaced comminuted proximal ulnar fracture with intra-articular extension to the coronoid.  No dislocation.    Mild muscular and soft tissue edema.  Joint effusion with fat pad elevation.                                       Medications   HYDROmorphone injection 1 mg (1 mg Intravenous Given 7/30/23 0029)   HYDROmorphone injection 1 mg (1 mg Intravenous Given 7/30/23 0246)   oxyCODONE-acetaminophen 5-325 mg per tablet 1 tablet (1 tablet Oral Given 7/30/23 3048)    methocarbamoL tablet 1,000 mg (1,000 mg Oral Given 7/30/23 0520)     Medical Decision Making:   Initial Assessment:   On initial assessment pt is hemodynamically stable, afebrile, and in no acute distress. Pt exam notable for swelling of the R digits however full exam of the injured limb is limited 2/2 cast in place. R digits are warm, soft, and there is no color change in the limbs.  Differential Diagnosis:   Fracture  ED Management:  Hydromorphone 1 mg given for pain control x 2.    Orthopedic Surgery consulted. Recommended CT with dispo pending results.    Signed out to Dr. Ruiz @ 0250 pending CT results and ortho recs for dispo.  Other:   I have discussed this case with another health care provider.            Attending Attestation:   Physician Attestation Statement for Resident:  As the supervising MD       -: Pt rapidly assessed. VSS. Pt received with splint in place, no red flags on initial exam. Pain well controlled. Evaluated by ortho on call who requested CT imaging that demonstrated: radial head intra-art fx w/mild displacement of anterior frag. Disp comm prox ulnar fx with intr-art extension to coronoid. Joint manipulation and resplinting per ortho. Will be provided opioid analgesia, antispasmodic medication. Educated about red flags including finger discoloration, numbness, tingling, pain out of prop. Asked to return for these or any new, worsening or concerning symptoms. Pt agreeable with POC. Dc'd in stable condition with a  . Agree with resident note and A/P.                                 Clinical Impression:   Final diagnoses:  [T14.8XXA] Fracture (Primary)  [S52.181A] Other closed fracture of proximal end of right radius, initial encounter  [S52.001A] Closed fracture of proximal end of right ulna, unspecified fracture morphology, initial encounter        ED Disposition Condition    Discharge Stable          ED Prescriptions       Medication Sig Dispense Start Date End Date Auth.  Provider    methocarbamoL (ROBAXIN) 500 MG Tab Take 2 tablets (1,000 mg total) by mouth 3 (three) times daily. for 7 days 42 tablet 7/30/2023 8/6/2023 Arun Ruiz MD    oxyCODONE-acetaminophen (PERCOCET) 5-325 mg per tablet Take 1 tablet by mouth every 6 (six) hours as needed for Pain. 20 tablet 7/30/2023 8/4/2023 Arun Ruiz MD          Follow-up Information       Follow up With Specialties Details Why Contact Info Additional Information    Santosh Zavala MD Orthopedic Surgery Schedule an appointment as soon as possible for a visit   [facility] [address]  [facility] [id number]  [phone]       [facility] - Orthopedics [facility] Schedule an appointment as soon as possible for a visit   [facility] [address] [facility] [id number] [phone] [facility] - Main Building, 5th Floor Please park in [facility] and take Atrium elevator             Rashaun Liu MD  Resident  07/30/23 0250       Arun Ruiz MD  07/30/23 5823

## 2023-07-30 NOTE — SUBJECTIVE & OBJECTIVE
Past Medical History:   Diagnosis Date    ALLERGIC RHINITIS     Hx of colonic polyps 4/05/2012    Tubular adenoma    Hyperlipidemia     Hypertension     MRSA infection     left medial thigh abscess & cellulitis    Personal history of kidney stones        Past Surgical History:   Procedure Laterality Date    COLONOSCOPY N/A 7/22/2020    Procedure: COLONOSCOPY;  Surgeon: Donato Shetty MD;  Location: [facility];  Service: Endoscopy;  Laterality: N/A;    HERNIA REPAIR      umbilical hernia repair with mesh    VASECTOMY         Review of patient's allergies indicates:   Allergen Reactions    No known allergies        Current Facility-Administered Medications   Medication    HYDROmorphone injection 1 mg     Current Outpatient Medications   Medication Sig    albuterol (PROVENTIL/VENTOLIN HFA) 90 mcg/actuation inhaler Inhale 2 puffs into the lungs every 6 (six) hours as needed for Wheezing.    ASPIRIN (ASPIR-81 ORAL)     atorvastatin (LIPITOR) 10 MG tablet Take 1 tablet (10 mg total) by mouth every evening.    cetirizine (ZYRTEC) 10 MG tablet Take 1 tablet (10 mg total) by mouth once daily. for 10 days    fenofibrate micronized (LOFIBRA) 134 MG Cap Take 1 capsule (134 mg total) by mouth daily with breakfast.    fluticasone propionate (FLONASE) 50 mcg/actuation nasal spray 1 spray (50 mcg total) by Each Nostril route once daily.    garlic 500 mg Cap Take 1 capsule by mouth once daily.    losartan-hydrochlorothiazide 100-12.5 mg (HYZAAR) 100-12.5 mg Tab Take 1 tablet by mouth once daily.    multivitamin (THERAGRAN) per tablet Take 1 tablet by mouth once daily.      OMEGA-3 FATTY ACIDS/FISH OIL (OMEGA 3 FISH OIL ORAL) Take by mouth. 1 Capsule By mouth Twice a day     potassium chloride SA (K-DUR,KLOR-CON) 20 MEQ tablet Take 1 tablet (20 mEq total) by mouth once daily.     Family History       Problem Relation (Age of Onset)    Breast cancer Mother    Colon cancer Maternal Grandmother    Coronary artery disease Father     Heart disease Father    Hypertension Mother    Testicular cancer Brother          Tobacco Use    Smoking status: Never    Smokeless tobacco: Never   Substance and Sexual Activity    Alcohol use: Not Currently     Comment: occasionally     Drug use: No    Sexual activity: Not on file     ROS  Constitutional: Denies fever/chills  Eyes: Denies change in vision  ENT: Denies sore throat or rhinorrhea   Respiratory: Denies shortness of breath or cough  Cardiovascular: Denies chest pain or palpitations  Gastrointestinal: Denies abdominal pain, nausea, or vomiting  Genitourinary: Denies dysuria and flank pain  Skin: Denies new rash or skin lesions   Allergic/Immunologic: Denies adverse reactions to current medications  Neurological: Denies headaches or dizziness  Musculoskeletal: see HPI    Objective:     Vital Signs (Most Recent):  Temp: 98.3 °F (36.8 °C) (07/29/23 2343)  Pulse: 77 (07/30/23 0132)  Resp: 16 (07/30/23 0029)  BP: 138/69 (07/30/23 0132)  SpO2: 95 % (07/30/23 0132) Vital Signs (24h Range):  Temp:  [98.3 °F (36.8 °C)-98.7 °F (37.1 °C)] 98.3 °F (36.8 °C)  Pulse:  [74-86] 77  Resp:  [16-23] 16  SpO2:  [92 %-100 %] 95 %  BP: (129-161)/(67-83) 138/69     Weight: 112.9 kg (249 lb)     Body mass index is 39 kg/m².    No intake or output data in the 24 hours ending 07/30/23 0203     Ortho/SPM Exam  Physical Exam:  General:  no acute distress, WDWN  HENT:  NCAT, Bilateral ears/eyes normal  CV:  Normal pulses, color, and cap refill  Lungs:  Normal respiratory effort  Neuro: No FND, awake, alert  Psych:  Oriented to Person, Place, Time, and Situation    MSK:       LUE:  Inspection: Skin intact throughout, no open wounds  Swelling present along the proximal forearm  No ecchymosis   Palpation: TTP at elbow; otherwise non-TTP throughout.  Palpable swelling most notably along mobile wad and dorsal compartment, but all compartments are easily compressible     ROM: AROM and PROM of the shoulder, wrist, hand intact without  "pain.  Range of motion at elbow limited secondary to pain  No evidence of joint dislocation   Neuro: AIN/PIN/Radial/Median/Ulnar Nerves assessed in isolation without deficit.  Able to give thumbs up, make "OK" sign, cross IF/LF, abduct/adduct fingers, make fist   SILT M/U/R nerve distributions.    Vascular: Radial artery palpated 2+. Capillary refill <3s.         RUE:  Inspection: Skin intact throughout, no swelling, no effusions, no ecchymosis   Palpation: Non-TTP throughout, no palpable abnormality.   ROM: AROM and PROM of the shoulder, elbow, wrist, and hand intact without pain.   Neuro: AIN/PIN/Radial/Median/Ulnar Nerves assessed in isolation without deficit.   SILT throughout.    Vascular: Radial artery palpated 2+. Capillary refill <3s.         LLE:  Inspection: Skin intact throughout, no swelling, no effusions, no ecchymosis   Palpation: Non-TTP throughout. No palpable abnormality.   ROM: AROM and PROM of the hip, knee, ankle, and foot intact without pain.   Neuro: TA/EHL/Gastroc/FHL assessed in isolation without deficit.   SILT throughout.    Vascular: Foot is WWP. Capillary refill <3s.         RLE:  Inspection: Skin intact throughout, no swelling, no effusions, no ecchymosis   Palpation: Non-TTP throughout. No palpable abnormality.   ROM: AROM and PROM of the hip, knee, ankle, and foot intact without pain.   Neuro: TA/EHL/Gastroc/FHL assessed in isolation without deficit.   SILT throughout.    Vascular: Foot is WWP. Capillary refill <3s.          Spine/pelvis/axial body:  No tenderness to palpation of cervical, thoracic, or lumbar spine  Stable and without pain with direct anterior pressure over ASIS.  No chest wall or abdominal tenderness  No decubitus ulcers  Muscle tone normal      Significant Labs: All pertinent labs within the past 24 hours have been reviewed.    Significant Imaging: I have reviewed and interpreted all pertinent imaging results/findings.  X-ray showing a segmental proximal ulna " fracture with questionable intra-articular extension along with a radial head fracture and possible avulsion fracture of the radial tuberosity

## 2023-07-30 NOTE — DISCHARGE INSTRUCTIONS
Thank you for coming to our Emergency Department today. It is important to remember that some problems are difficult to diagnose and may not be found during your first visit. Be sure to follow up with your primary care doctor and review any labs/imaging that was performed with them. If you do not have a primary care doctor, you may contact the one listed on your discharge paperwork or you may also call the [facility] Appointment Desk at [phone] to schedule an appointment with one.     All medications may potentially have side effects and it is impossible to predict which medications may give you side effects. If you feel that you are having a negative effect of any medication you should immediately stop taking them and seek medical attention.    Return to the ER with any questions/concerns, new/concerning symptoms, worsening or failure to improve. Do not drive or make any important decisions for 24 hours if you have received any pain medications, sedatives or mood altering drugs during your ER visit.

## 2023-07-30 NOTE — ED TRIAGE NOTES
Darrian Velez, a 68 y.o. male presents to the ED w/ complaint of known left arm fractures for ortho eval/tx. Patient fell earlier today and broke his proximal radius and ulna in the left arm. This was splinted at the outside facility and he drove here for ortho consult. Denies any other injury, head injury, headache, numbness, or tingling.    Triage note:  Chief Complaint   Patient presents with    Arm Injury     Transfer from [place] for fractured arm.     Review of patient's allergies indicates:   Allergen Reactions    No known allergies      Past Medical History:   Diagnosis Date    ALLERGIC RHINITIS     Hx of colonic polyps 4/05/2012    Tubular adenoma    Hyperlipidemia     Hypertension     MRSA infection     left medial thigh abscess & cellulitis    Personal history of kidney stones

## 2023-07-30 NOTE — ASSESSMENT & PLAN NOTE
Darrian Velez is a 68 y.o. male with PMH of HTN, HLD presenting with fractures of the proximal ulna and radial head after a fall from standing.  Originally evaluated [place] ED and transferred for further surgical evaluation.  He presented closed and neurovascularly intact.  Posterior long-arm splint was taken down, and he was placed in a posterior long arm splint with sugartong along with sling/swathe.  CT ordered, showing comminuted proximal ulnar fracture with intra-articular extension along with intra-articular radial head fracture and a non-displaced avulsion fx of the radial tuberosity.  Following splint placement, patient reported improvement in pain and numbness at the fingertips.  He was educated on the signs and symptoms of compartment syndrome and given strict return precautions for any worsening numbness, uncontrolled pain, discoloration of fingers, or developing weakness with finger range of motion.    Plan:   - Nonweightbearing to left upper extremity   - Patient was advised to keep splint dry and intact until clinic follow-up  - Ice and elevate affected extremity  - Followup in outpatient trauma clinic this week (patient will be contacted with scheduling information)

## 2023-07-30 NOTE — ED NOTES
Pt presents with mechanical fall onto left arm pta, 2+radial pulses present.  Denies head trauma, loc, and - blood thinners.    Patient identifiers verified by spelling and stated name on armband along with .     Review of patient's allergies indicates:  Review of patient's allergies indicates:   Allergen Reactions    No known allergies        APPEARANCE: . No apparent distress or deformities noted.  NEURO: A&Ox4, GCS-15.   No neuro deficits noted.   CARDIAC: Denies CP.   PERIPHERAL VASCULAR: Peripheral pulses present. Cap refill < 3 seconds x4. No edema present. Warm to touch.    RESPIRATORY:Respirations are even and unlabored with regular rate and effort, No use of accessory muscles, denies SOB.   MUSC: Limited ROM to lue with tenderness  SKIN: Skin is warm and dry    Patient updated on plan of care and changed into hospital gown.  Bed locked and in low position with side rails up x2, call light within reach, family at bedside.     Will continue to monitor.

## 2023-07-30 NOTE — CONSULTS
[facility] Emergency Dept  Orthopedics  Consult Note    Patient Name: Darrian Velez  MRN: [id number]  Admission Date: 7/29/2023  Hospital Length of Stay: 0 days  Attending Provider: Arun Ruiz MD  Primary Care Provider: Balwinder Alvarado MD     Patient information was obtained from patient and ER records.     Inpatient consult to Orthopedics  Consult performed by: JOSÉ MIGUEL Butler MD  Consult ordered by: Rashaun Liu MD        Subjective:     Principal Problem:Closed fracture of left proximal radius and ulna    Chief Complaint:   Chief Complaint   Patient presents with    Arm Injury     Transfer from [place] for fractured arm.        HPI: Patient is a right-hand dominant 68-year-old male past medical history hypertension, hyperlipidemia, presenting with right forearm pain after a fall from standing earlier this morning.  Patient states that he tripped while at home falling directly onto his left elbow with immediate pain and swelling at the proximal forearm.  Endorses numbness and tingling at the tips of all digits.  States that he is never injured this extremity before and denies any other musculoskeletal pains at this time.  Lives at home with his wife, does not smoke, does not drink and denies IV drug use.  Not on any blood thinners.        Past Medical History:   Diagnosis Date    ALLERGIC RHINITIS     Hx of colonic polyps 4/05/2012    Tubular adenoma    Hyperlipidemia     Hypertension     MRSA infection     left medial thigh abscess & cellulitis    Personal history of kidney stones        Past Surgical History:   Procedure Laterality Date    COLONOSCOPY N/A 7/22/2020    Procedure: COLONOSCOPY;  Surgeon: Donato Shetty MD;  Location: [facility];  Service: Endoscopy;  Laterality: N/A;    HERNIA REPAIR      umbilical hernia repair with mesh    VASECTOMY         Review of patient's allergies indicates:   Allergen Reactions    No known allergies        Current Facility-Administered  Medications   Medication    HYDROmorphone injection 1 mg     Current Outpatient Medications   Medication Sig    albuterol (PROVENTIL/VENTOLIN HFA) 90 mcg/actuation inhaler Inhale 2 puffs into the lungs every 6 (six) hours as needed for Wheezing.    ASPIRIN (ASPIR-81 ORAL)     atorvastatin (LIPITOR) 10 MG tablet Take 1 tablet (10 mg total) by mouth every evening.    cetirizine (ZYRTEC) 10 MG tablet Take 1 tablet (10 mg total) by mouth once daily. for 10 days    fenofibrate micronized (LOFIBRA) 134 MG Cap Take 1 capsule (134 mg total) by mouth daily with breakfast.    fluticasone propionate (FLONASE) 50 mcg/actuation nasal spray 1 spray (50 mcg total) by Each Nostril route once daily.    garlic 500 mg Cap Take 1 capsule by mouth once daily.    losartan-hydrochlorothiazide 100-12.5 mg (HYZAAR) 100-12.5 mg Tab Take 1 tablet by mouth once daily.    multivitamin (THERAGRAN) per tablet Take 1 tablet by mouth once daily.      OMEGA-3 FATTY ACIDS/FISH OIL (OMEGA 3 FISH OIL ORAL) Take by mouth. 1 Capsule By mouth Twice a day     potassium chloride SA (K-DUR,KLOR-CON) 20 MEQ tablet Take 1 tablet (20 mEq total) by mouth once daily.     Family History       Problem Relation (Age of Onset)    Breast cancer Mother    Colon cancer Maternal Grandmother    Coronary artery disease Father    Heart disease Father    Hypertension Mother    Testicular cancer Brother          Tobacco Use    Smoking status: Never    Smokeless tobacco: Never   Substance and Sexual Activity    Alcohol use: Not Currently     Comment: occasionally     Drug use: No    Sexual activity: Not on file     ROS  Constitutional: Denies fever/chills  Eyes: Denies change in vision  ENT: Denies sore throat or rhinorrhea   Respiratory: Denies shortness of breath or cough  Cardiovascular: Denies chest pain or palpitations  Gastrointestinal: Denies abdominal pain, nausea, or vomiting  Genitourinary: Denies dysuria and flank pain  Skin: Denies new rash or skin  "lesions   Allergic/Immunologic: Denies adverse reactions to current medications  Neurological: Denies headaches or dizziness  Musculoskeletal: see HPI    Objective:     Vital Signs (Most Recent):  Temp: 98.3 °F (36.8 °C) (07/29/23 2343)  Pulse: 77 (07/30/23 0132)  Resp: 16 (07/30/23 0029)  BP: 138/69 (07/30/23 0132)  SpO2: 95 % (07/30/23 0132) Vital Signs (24h Range):  Temp:  [98.3 °F (36.8 °C)-98.7 °F (37.1 °C)] 98.3 °F (36.8 °C)  Pulse:  [74-86] 77  Resp:  [16-23] 16  SpO2:  [92 %-100 %] 95 %  BP: (129-161)/(67-83) 138/69     Weight: 112.9 kg (249 lb)     Body mass index is 39 kg/m².    No intake or output data in the 24 hours ending 07/30/23 0203     Ortho/SPM Exam  Physical Exam:  General:  no acute distress, WDWN  HENT:  NCAT, Bilateral ears/eyes normal  CV:  Normal pulses, color, and cap refill  Lungs:  Normal respiratory effort  Neuro: No FND, awake, alert  Psych:  Oriented to Person, Place, Time, and Situation    MSK:       LUE:  Inspection: Skin intact throughout, no open wounds  Swelling present along the proximal forearm  No ecchymosis   Palpation: TTP at elbow; otherwise non-TTP throughout.  Palpable swelling most notably along mobile wad and dorsal compartment, but all compartments are easily compressible     ROM: AROM and PROM of the shoulder, wrist, hand intact without pain.  Range of motion at elbow limited secondary to pain  No evidence of joint dislocation   Neuro: AIN/PIN/Radial/Median/Ulnar Nerves assessed in isolation without deficit.  Able to give thumbs up, make "OK" sign, cross IF/LF, abduct/adduct fingers, make fist   SILT M/U/R nerve distributions.    Vascular: Radial artery palpated 2+. Capillary refill <3s.         RUE:  Inspection: Skin intact throughout, no swelling, no effusions, no ecchymosis   Palpation: Non-TTP throughout, no palpable abnormality.   ROM: AROM and PROM of the shoulder, elbow, wrist, and hand intact without pain.   Neuro: AIN/PIN/Radial/Median/Ulnar Nerves assessed in " isolation without deficit.   SILT throughout.    Vascular: Radial artery palpated 2+. Capillary refill <3s.         LLE:  Inspection: Skin intact throughout, no swelling, no effusions, no ecchymosis   Palpation: Non-TTP throughout. No palpable abnormality.   ROM: AROM and PROM of the hip, knee, ankle, and foot intact without pain.   Neuro: TA/EHL/Gastroc/FHL assessed in isolation without deficit.   SILT throughout.    Vascular: Foot is WWP. Capillary refill <3s.         RLE:  Inspection: Skin intact throughout, no swelling, no effusions, no ecchymosis   Palpation: Non-TTP throughout. No palpable abnormality.   ROM: AROM and PROM of the hip, knee, ankle, and foot intact without pain.   Neuro: TA/EHL/Gastroc/FHL assessed in isolation without deficit.   SILT throughout.    Vascular: Foot is WWP. Capillary refill <3s.          Spine/pelvis/axial body:  No tenderness to palpation of cervical, thoracic, or lumbar spine  Stable and without pain with direct anterior pressure over ASIS.  No chest wall or abdominal tenderness  No decubitus ulcers  Muscle tone normal      Significant Labs: All pertinent labs within the past 24 hours have been reviewed.    Significant Imaging: I have reviewed and interpreted all pertinent imaging results/findings.  X-ray showing a segmental proximal ulna fracture with questionable intra-articular extension along with a radial head fracture and possible avulsion fracture of the radial tuberosity    Assessment/Plan:     * Closed fracture of left proximal radius and ulna  Darrian Velez is a 68 y.o. male with PMH of HTN, HLD presenting with fractures of the proximal ulna and radial head after a fall from standing.  Originally evaluated [facility] and transferred for further surgical evaluation.  He presented closed and neurovascularly intact.  Posterior long-arm splint was taken down, and he was placed in a posterior long arm splint with sugartong along with sling/swathe.  CT ordered, showing  comminuted proximal ulnar fracture with intra-articular extension along with intra-articular radial head fracture and a non-displaced avulsion fx of the radial tuberosity.  Following splint placement, patient reported improvement in pain and resolution of numbness at the fingertips.  He was educated on the signs and symptoms of compartment syndrome and given strict return precautions for any worsening numbness, uncontrolled pain, discoloration of fingers, or developing weakness with finger range of motion.    Plan:   - Nonweightbearing to left upper extremity   - Patient was advised to keep splint dry and intact until clinic follow-up  - Ice and elevate affected extremity  - Followup in outpatient trauma clinic this week (patient will be contacted with scheduling information)          JOSÉ MIGUEL Butler MD  Orthopedics  [facility] Emergency Dept

## 2023-07-30 NOTE — ED NOTES
LOC: The patient is awake, alert, and oriented to self, place, time, and situation. Pt is calm and cooperative. Affect is appropriate.  Speech is appropriate and clear.     APPEARANCE: Patient resting comfortably in no acute distress.  Patient is clean and well groomed.    SKIN: The skin is warm and dry; color consistent with ethnicity.  Patient has normal skin turgor and moist mucus membranes.  Skin intact; no breakdown or bruising noted.     MUSCULOSKELETAL:  Left arm in cast from mid-upper arm down to hand. Reports 10/10 left elbow pain. Denies any other MSK pain or injury.     RESPIRATORY: Airway is open and patent. Respirations spontaneous, even, easy, and non-labored.  No audible wheeze. Patient has a normal effort and rate.  No accessory muscle use noted. Denies cough.     CARDIAC:  Normal rate noted.  No peripheral edema noted. 2+ radial pulses bilaterally. No complaints of chest pain.     ABDOMEN:  No distention noted. Pt denies abdominal pain; denies nausea, vomiting, diarrhea, or constipation.    NEUROLOGIC: Eyes open spontaneously.  Behavior appropriate to situation.  Follows commands; facial expression symmetrical.  Purposeful motor response noted; normal sensation in all extremities. Pt denies headache; denies lightheadedness or dizziness; denies visual disturbances; denies loss of balance; denies unilateral weakness.

## 2023-07-31 ENCOUNTER — TELEPHONE (OUTPATIENT)
Dept: ORTHOPEDICS | Facility: CLINIC | Age: 68
End: 2023-07-31
Payer: COMMERCIAL

## 2023-07-31 ENCOUNTER — HOSPITAL ENCOUNTER (EMERGENCY)
Facility: HOSPITAL | Age: 68
Discharge: HOME | End: 2023-07-31
Attending: EMERGENCY MEDICINE
Payer: COMMERCIAL

## 2023-07-31 VITALS
HEART RATE: 74 BPM | RESPIRATION RATE: 20 BRPM | WEIGHT: 248.88 LBS | BODY MASS INDEX: 38.98 KG/M2 | SYSTOLIC BLOOD PRESSURE: 132 MMHG | DIASTOLIC BLOOD PRESSURE: 77 MMHG | TEMPERATURE: 99 F | OXYGEN SATURATION: 96 %

## 2023-07-31 DIAGNOSIS — S52.102A CLOSED FRACTURE OF PROXIMAL END OF LEFT RADIUS AND ULNA, INITIAL ENCOUNTER: Primary | ICD-10-CM

## 2023-07-31 DIAGNOSIS — M79.602 LEFT ARM PAIN: Primary | ICD-10-CM

## 2023-07-31 DIAGNOSIS — S52.002A CLOSED FRACTURE OF PROXIMAL END OF LEFT RADIUS AND ULNA, INITIAL ENCOUNTER: Primary | ICD-10-CM

## 2023-07-31 PROCEDURE — 29125 APPL SHORT ARM SPLINT STATIC: CPT | Mod: RT

## 2023-07-31 PROCEDURE — 29105 APPLICATION LONG ARM SPLINT: CPT | Mod: LT

## 2023-07-31 PROCEDURE — 25000003 PHARM REV CODE 250: Performed by: EMERGENCY MEDICINE

## 2023-07-31 PROCEDURE — 25000003 PHARM REV CODE 250

## 2023-07-31 PROCEDURE — 99283 EMERGENCY DEPT VISIT LOW MDM: CPT

## 2023-07-31 RX ORDER — OXYCODONE AND ACETAMINOPHEN 7.5; 325 MG/1; MG/1
1 TABLET ORAL EVERY 4 HOURS PRN
Qty: 11 TABLET | Refills: 0 | Status: SHIPPED | OUTPATIENT
Start: 2023-07-31

## 2023-07-31 RX ORDER — OXYCODONE HYDROCHLORIDE 5 MG/1
10 TABLET ORAL
Status: COMPLETED | OUTPATIENT
Start: 2023-07-31 | End: 2023-07-31

## 2023-07-31 RX ORDER — MUPIROCIN 20 MG/G
OINTMENT TOPICAL
Status: CANCELLED | OUTPATIENT
Start: 2023-07-31

## 2023-07-31 RX ADMIN — OXYCODONE HYDROCHLORIDE 10 MG: 5 TABLET ORAL at 05:07

## 2023-07-31 RX ADMIN — OXYCODONE HYDROCHLORIDE 10 MG: 5 TABLET ORAL at 07:07

## 2023-07-31 NOTE — ED NOTES
Assumed care of the patient. Report received from Anastacia. Pt on continuous cardiac monitoring, continuous pulse oximetry, and automatic BP cuff cycling Q60min. Pt in hospital gown, side rails up X2, bed low and locked, and call light is placed within reach. One family/visitors at bedside at this time. Pt denies any complaints or needs. White board updated with patient plan of care.

## 2023-07-31 NOTE — ED NOTES
Darrian Velez, an 68 y.o. male presents to the ED reporting increasing pain to L arm/hand. Pt's L arm was casted last night at this facility after a fall resulted in a break. Pt states he has been taking his pain medication as prescribed but has had no relief & is now experiencing numbness, tightness, and color change to L arm. Fingers on L hand are noted to be slightly paler than R hand. Cap refil is <3 sec in bilat upper extremeties. PMS/NV intact to L hand. Pt denies any other complaints. Reports he last took oxy at 2am      Review of patient's allergies indicates:   Allergen Reactions    No known allergies      Chief Complaint   Patient presents with    Arm Injury     Patient reports that he is having swelling and tightness in left arm, which was recently casted after a break. Hand is warm cap refill <3 seconds. States numbness to the left arm.      Past Medical History:   Diagnosis Date    ALLERGIC RHINITIS     Hx of colonic polyps 4/05/2012    Tubular adenoma    Hyperlipidemia     Hypertension     MRSA infection     left medial thigh abscess & cellulitis    Personal history of kidney stones

## 2023-07-31 NOTE — ED PROVIDER NOTES
Encounter Date: 2023       History     Chief Complaint   Patient presents with    Arm Injury     Patient reports that he is having swelling and tightness in left arm, which was recently casted after a break. Hand is warm cap refill <3 seconds. States numbness to the left arm.      68-year-old male with past medical history of hypertension and recent ground level fall resulting in left proximal ulna and radial head fracture for which he [place] [facility] [place] who is presenting today with complaints of swelling and color change to his left hand. He states that despite elevating his arm, he has awoken to purple color changes scattered over the lateral side of his fingers that he attributes to a tight splint. Patient denies fevers, weakness, numbness and tingling.      Review of patient's allergies indicates:   Allergen Reactions    No known allergies      Past Medical History:   Diagnosis Date    ALLERGIC RHINITIS     Hx of colonic polyps 2012    Tubular adenoma    Hyperlipidemia     Hypertension     MRSA infection     left medial thigh abscess & cellulitis    Personal history of kidney stones      Past Surgical History:   Procedure Laterality Date    COLONOSCOPY N/A 2020    Procedure: COLONOSCOPY;  Surgeon: Donato Shetty MD;  Location: [facility];  Service: Endoscopy;  Laterality: N/A;    HERNIA REPAIR      umbilical hernia repair with mesh    VASECTOMY       Family History   Problem Relation Age of Onset    Coronary artery disease Father          age 59    Heart disease Father     Breast cancer Mother     Hypertension Mother     Colon cancer Maternal Grandmother     Testicular cancer Brother      Social History     Tobacco Use    Smoking status: Never    Smokeless tobacco: Never   Substance Use Topics    Alcohol use: Not Currently     Comment: occasionally     Drug use: No     Review of Systems   Constitutional:  Negative for chills and fever.   HENT:  Negative for congestion  and sore throat.    Eyes:  Negative for pain and discharge.   Respiratory:  Negative for shortness of breath and wheezing.    Cardiovascular:  Negative for chest pain and palpitations.   Gastrointestinal:  Negative for abdominal pain, nausea and vomiting.   Genitourinary:  Negative for difficulty urinating and dysuria.   Musculoskeletal:  Negative for back pain and joint swelling.        Left hand swelling   Skin:  Negative for color change and rash.   Neurological:  Negative for weakness and numbness.   Hematological:  Does not bruise/bleed easily.       Physical Exam     Initial Vitals [07/31/23 0331]   BP Pulse Resp Temp SpO2   (!) 145/77 75 18 98.8 °F (37.1 °C) 96 %      MAP       --         Physical Exam    Nursing note and vitals reviewed.  Constitutional: He is not diaphoretic. No distress.   HENT:   Head: Normocephalic and atraumatic.   Mouth/Throat: Oropharynx is clear and moist.   Eyes: Conjunctivae and EOM are normal.   Neck: Neck supple.   Normal range of motion.  Cardiovascular:  Normal rate, regular rhythm, normal heart sounds and intact distal pulses.           Pulmonary/Chest: Breath sounds normal. He has no wheezes. He has no rhonchi. He has no rales.   Abdominal: Abdomen is soft. He exhibits no distension. There is no abdominal tenderness.   Musculoskeletal:         General: No tenderness or edema. Normal range of motion.      Cervical back: Normal range of motion and neck supple.      Comments: Mild swelling of left hand. Splint in place. Compartments of hand are soft. Brisk capillary refill. Palpable radial pulse. Motor and sensation intact.      Neurological: He is alert and oriented to person, place, and time. He has normal strength. No sensory deficit.   Skin: Skin is warm and dry. Capillary refill takes less than 2 seconds.         ED Course   Procedures  Labs Reviewed - No data to display         Imaging Results              X-Ray Forearm Left (Final result)  Result time 07/31/23 10:29:30       Final result by Robert Melchor III, MD (07/31/23 10:29:30)                   Narrative:    EXAMINATION:  XR FOREARM LEFT    CLINICAL HISTORY:  pain;    FINDINGS: FOREARM TWO VIEWS LEFT:  There are fractures of the radius and ulna.  There is good alignment no complication seen.      Electronically signed by: Robert Melchor MD  Date:    07/31/2023  Time:    10:29                                     X-Ray Elbow Complete Left (Final result)  Result time 07/31/23 10:29:48      Final result by Robert Melchor III, MD (07/31/23 10:29:48)                   Narrative:    EXAMINATION:  XR ELBOW COMPLETE 3 VIEW LEFT    CLINICAL HISTORY:  ap, rad-cap, and perfect lateral (trochlea and capitellum overlapping perfectly so joint space visible);    FINDINGS:  Elbow complete three views left: There are proximal ulnar and radial fractures.  There is good alignment no complication.      Electronically signed by: Robert Melchor MD  Date:    07/31/2023  Time:    10:29                                     X-Ray Elbow Complete Left (Final result)  Result time 07/31/23 06:17:11      Final result by Arpit Menjivar MD (07/31/23 06:17:11)                   Impression:      Redemonstration of recent fractures of the left olecranon/proximal ulnar shaft and of the head of the left radius.      Electronically signed by: Arpit Menjivar MD  Date:    07/31/2023  Time:    06:17               Narrative:    EXAMINATION:  XR ELBOW COMPLETE 3 VIEW LEFT    CLINICAL HISTORY:  ap, rad-cap, and perfect lateral (trochlea and capitellum overlapping perfectly so joint space visible);    TECHNIQUE:  Three views of the left elbow were obtained, with AP, lateral, and oblique projections submitted.    COMPARISON:  Comparison is made to 07/29/2023 at 18:57.    FINDINGS:  Overlying cast material now obscures osseous detail to some extent.  Once again identified is a recent, comminuted fracture of the proximal left ulna involving the olecranon process and proximal  shaft, with no significant displacement/angulation of the major fragments.  Also again observed is a recent, essentially nondisplaced/nonangulated left radial head fracture.  No definite additional fractures or other significant osseous abnormality.  No detrimental change since the examination referenced above.                                       X-Ray Forearm Left (Final result)  Result time 07/31/23 06:19:55      Final result by Arpit Menjivar MD (07/31/23 06:19:55)                   Impression:      As above      Electronically signed by: Arpit Menjivar MD  Date:    07/31/2023  Time:    06:19               Narrative:    EXAMINATION:  XR FOREARM LEFT    CLINICAL HISTORY:  pain;    TECHNIQUE:  Two views of the left forearm were obtained, with AP and lateral projections submitted.    COMPARISON:  Comparison is made to 07/29/2023 at 18:59.    FINDINGS:  Overlying cast material now obscures osseous detail to some extent.  Once again identified is a recent, comminuted fracture of the proximal left ulna which involves the olecranon process and proximal shaft.  Also again identified is a recent left radial head fracture.  No definite additional fractures or other significant osseous abnormality appreciated.  No significant detrimental interval change since the precasting radiograph of 07/29/2023 is observed.                                       Medications   oxyCODONE immediate release tablet 10 mg (10 mg Oral Given 7/31/23 0558)   oxyCODONE immediate release tablet 10 mg (10 mg Oral Given 7/31/23 0755)     Medical Decision Making:   History:   Old Medical Records: I decided to obtain old medical records.  Differential Diagnosis:   Compartment syndrome  Dependent swelling  Dislocation due to re-injury  ED Management:  Patient is HDS and neurologically intact. Compartments are soft, lowering suspicion for compartment syndrome. Orthopedic surgery consulted. Care of this patient signed out to oncoming ED team. Dispo pending  ortho evaluation.                           Clinical Impression:   Final diagnoses:  [M79.602] Left arm pain (Primary)        ED Disposition Condition    Discharge Stable          ED Prescriptions       Medication Sig Dispense Start Date End Date Auth. Provider    oxyCODONE-acetaminophen (PERCOCET) 7.5-325 mg per tablet Take 1 tablet by mouth every 4 (four) hours as needed for Pain. 11 tablet 7/31/2023 -- yBron Zarco MD          Follow-up Information       Follow up With Specialties Details Why Contact Info    Balwinder Alvarado MD Family Medicine  As needed, If symptoms worsen [address] [facility] [address]  [phone]      [facility] - Emergency Dept Emergency Medicine   [address] [address] [phone]             Mia Maharaj MD  Resident  08/02/23 [id number]

## 2023-07-31 NOTE — TELEPHONE ENCOUNTER
----- Message from Taty Rebolledo sent at 7/31/2023  3:13 PM CDT -----  Regarding: appt  Pt spouse calling in regards to appt for left broken arm , is scheduled for surgery on 8-7 was told to make appt     Confirmed patient's contact info below:  Contact Name: Darrian Velez  Phone Number:[phone]

## 2023-07-31 NOTE — SUBJECTIVE & OBJECTIVE
Past Medical History:   Diagnosis Date    ALLERGIC RHINITIS     Hx of colonic polyps 4/05/2012    Tubular adenoma    Hyperlipidemia     Hypertension     MRSA infection     left medial thigh abscess & cellulitis    Personal history of kidney stones        Past Surgical History:   Procedure Laterality Date    COLONOSCOPY N/A 7/22/2020    Procedure: COLONOSCOPY;  Surgeon: Donato Shetty MD;  Location: [facility];  Service: Endoscopy;  Laterality: N/A;    HERNIA REPAIR      umbilical hernia repair with mesh    VASECTOMY         Review of patient's allergies indicates:   Allergen Reactions    No known allergies        No current facility-administered medications for this encounter.     Current Outpatient Medications   Medication Sig    albuterol (PROVENTIL/VENTOLIN HFA) 90 mcg/actuation inhaler Inhale 2 puffs into the lungs every 6 (six) hours as needed for Wheezing.    ASPIRIN (ASPIR-81 ORAL)     atorvastatin (LIPITOR) 10 MG tablet Take 1 tablet (10 mg total) by mouth every evening.    cetirizine (ZYRTEC) 10 MG tablet Take 1 tablet (10 mg total) by mouth once daily. for 10 days    fenofibrate micronized (LOFIBRA) 134 MG Cap Take 1 capsule (134 mg total) by mouth daily with breakfast.    fluticasone propionate (FLONASE) 50 mcg/actuation nasal spray 1 spray (50 mcg total) by Each Nostril route once daily.    garlic 500 mg Cap Take 1 capsule by mouth once daily.    losartan-hydrochlorothiazide 100-12.5 mg (HYZAAR) 100-12.5 mg Tab Take 1 tablet by mouth once daily.    methocarbamoL (ROBAXIN) 500 MG Tab Take 2 tablets (1,000 mg total) by mouth 3 (three) times daily. for 7 days    multivitamin (THERAGRAN) per tablet Take 1 tablet by mouth once daily.      OMEGA-3 FATTY ACIDS/FISH OIL (OMEGA 3 FISH OIL ORAL) Take by mouth. 1 Capsule By mouth Twice a day     oxyCODONE-acetaminophen (PERCOCET) 5-325 mg per tablet Take 1 tablet by mouth every 6 (six) hours as needed for Pain.    potassium chloride SA (K-DUR,KLOR-CON) 20  MEQ tablet Take 1 tablet (20 mEq total) by mouth once daily.     Family History       Problem Relation (Age of Onset)    Breast cancer Mother    Colon cancer Maternal Grandmother    Coronary artery disease Father    Heart disease Father    Hypertension Mother    Testicular cancer Brother          Tobacco Use    Smoking status: Never    Smokeless tobacco: Never   Substance and Sexual Activity    Alcohol use: Not Currently     Comment: occasionally     Drug use: No    Sexual activity: Not on file     ROS    Constitutional: negative for fevers  Eyes: negative visual changes  ENT: negative for hearing loss  Respiratory: negative for dyspnea  Cardiovascular: negative for chest pain  Gastrointestinal: negative for abdominal pain  Genitourinary: negative for dysuria  Neurological: negative for headaches  Behavioral/Psych: negative for hallucinations  Endocrine: negative for temperature intolerance      Objective:     Vital Signs (Most Recent):  Temp: 99.1 °F (37.3 °C) (07/31/23 0700)  Pulse: 74 (07/31/23 0700)  Resp: 20 (07/31/23 0755)  BP: 132/77 (07/31/23 0700)  SpO2: 96 % (07/31/23 0602) Vital Signs (24h Range):  Temp:  [98.8 °F (37.1 °C)-99.1 °F (37.3 °C)] 99.1 °F (37.3 °C)  Pulse:  [72-76] 74  Resp:  [18-20] 20  SpO2:  [95 %-96 %] 96 %  BP: (132-147)/(64-77) 132/77     Weight: 112.9 kg (248 lb 14.4 oz)     Body mass index is 38.98 kg/m².    No intake or output data in the 24 hours ending 07/31/23 0906     Ortho/SPM Exam     Gen:  No acute distress, well-developed, well nourished.  CV:  Peripherally well-perfused. 2+ radial pulses, symmetric.  Respiratory:  Normal respiratory effort. No accessory muscle use.   Head/Neck:  Normocephalic.  Atraumatic. Sclera anicteric. TM. Neck supple.  Neuro: CN 2-12 grossly intact. No FND. Awake. Alert. Oriented to person, place, time, and situation.   Abdomen: Soft, NTND.      MSK:  Left Upper Extremity  Inspection  - Splint removed  - Skin intact throughout, no open wounds  - WWP,  "hand discoloration patient complains of has resolved  - No swelling, no blistering, skin wrinkles to palpatino  - No ecchymosis, erythema, or signs of cellulitis  Palpation  - TTP over the elbow, no TTP over the hand, wrist, humerus, or shoulder  Range of motion  - AROM and PROM of the shoulder, wrist, and hand intact  - ROM at the elbow limited due to pain  Stability  - No evidence of joint dislocation or abnormal laxity   Neurovascular  - AIN/PIN/Radial/Median/Ulnar Nerves assessed in isolation without deficit  - Able to give thumbs up, make "OK" sign, cross IF/LF, abduct/adduct fingers, make fist  - SILT throughout  - Compartments soft  - Radial artery palpated  - Capillary Refill <3s  - Muscle tone normal      Significant Labs: All pertinent labs within the past 24 hours have been reviewed.    Significant Imaging: X-Ray: I have reviewed all pertinent results/findings and my personal findings are:  XR of right forearm and elbow prior to splint removal are unchanged from post reduction films. XR after new splint application show appropriate stabilization and no acute changes to fracture architecture.   I have reviewed and interpreted all pertinent imaging results/findings.  "

## 2023-07-31 NOTE — CONSULTS
[facility] - Emergency Dept  Orthopedics  Consult Note    Patient Name: Darrian Velez  MRN: [id number]  Admission Date: 7/31/2023  Hospital Length of Stay: 0 days  Attending Provider: Mary Lou Melgoza MD  Primary Care Provider: Balwinder Alvarado MD      Inpatient consult to Orthopedic Surgery  Consult performed by: Rupesh Garnett MD  Consult ordered by: Mia Maharaj MD        Subjective:     Principal Problem:<principal problem not specified>    Chief Complaint:   Chief Complaint   Patient presents with    Arm Injury     Patient reports that he is having swelling and tightness in left arm, which was recently casted after a break. Hand is warm cap refill <3 seconds. States numbness to the left arm.         HPI: Darrian Velez is a 68 y.o. male with PMH significant for hypertension and recent ground level fall resulting in left proximal ulna and radial head fracture for which he was seen at [facility] main ED on 07/29/2023 who is presenting today with complaints of swelling and color change to the hand that he attributes to a tight splint. Patient denies numbness and tingling. Denies any other musculoskeletal pain or injuries.  Walks w/out assisted devices at baseline. Doesn't take any anticoagulation at baseline. Last ate 7:00 PM yesteray.  They deny IV drug use.  They deny tobacco use.   They deny alcohol use.   They deny immunosuppressant medications.  They deny chemotherapy.  They deny radiation therapy.         Past Medical History:   Diagnosis Date    ALLERGIC RHINITIS     Hx of colonic polyps 4/05/2012    Tubular adenoma    Hyperlipidemia     Hypertension     MRSA infection     left medial thigh abscess & cellulitis    Personal history of kidney stones        Past Surgical History:   Procedure Laterality Date    COLONOSCOPY N/A 7/22/2020    Procedure: COLONOSCOPY;  Surgeon: Donato Shetty MD;  Location: [facility];  Service: Endoscopy;  Laterality: N/A;    HERNIA REPAIR      umbilical  hernia repair with mesh    VASECTOMY         Review of patient's allergies indicates:   Allergen Reactions    No known allergies        No current facility-administered medications for this encounter.     Current Outpatient Medications   Medication Sig    albuterol (PROVENTIL/VENTOLIN HFA) 90 mcg/actuation inhaler Inhale 2 puffs into the lungs every 6 (six) hours as needed for Wheezing.    ASPIRIN (ASPIR-81 ORAL)     atorvastatin (LIPITOR) 10 MG tablet Take 1 tablet (10 mg total) by mouth every evening.    cetirizine (ZYRTEC) 10 MG tablet Take 1 tablet (10 mg total) by mouth once daily. for 10 days    fenofibrate micronized (LOFIBRA) 134 MG Cap Take 1 capsule (134 mg total) by mouth daily with breakfast.    fluticasone propionate (FLONASE) 50 mcg/actuation nasal spray 1 spray (50 mcg total) by Each Nostril route once daily.    garlic 500 mg Cap Take 1 capsule by mouth once daily.    losartan-hydrochlorothiazide 100-12.5 mg (HYZAAR) 100-12.5 mg Tab Take 1 tablet by mouth once daily.    methocarbamoL (ROBAXIN) 500 MG Tab Take 2 tablets (1,000 mg total) by mouth 3 (three) times daily. for 7 days    multivitamin (THERAGRAN) per tablet Take 1 tablet by mouth once daily.      OMEGA-3 FATTY ACIDS/FISH OIL (OMEGA 3 FISH OIL ORAL) Take by mouth. 1 Capsule By mouth Twice a day     oxyCODONE-acetaminophen (PERCOCET) 5-325 mg per tablet Take 1 tablet by mouth every 6 (six) hours as needed for Pain.    potassium chloride SA (K-DUR,KLOR-CON) 20 MEQ tablet Take 1 tablet (20 mEq total) by mouth once daily.     Family History       Problem Relation (Age of Onset)    Breast cancer Mother    Colon cancer Maternal Grandmother    Coronary artery disease Father    Heart disease Father    Hypertension Mother    Testicular cancer Brother          Tobacco Use    Smoking status: Never    Smokeless tobacco: Never   Substance and Sexual Activity    Alcohol use: Not Currently     Comment: occasionally     Drug use: No     Sexual activity: Not on file     ROS    Constitutional: negative for fevers  Eyes: negative visual changes  ENT: negative for hearing loss  Respiratory: negative for dyspnea  Cardiovascular: negative for chest pain  Gastrointestinal: negative for abdominal pain  Genitourinary: negative for dysuria  Neurological: negative for headaches  Behavioral/Psych: negative for hallucinations  Endocrine: negative for temperature intolerance      Objective:     Vital Signs (Most Recent):  Temp: 99.1 °F (37.3 °C) (07/31/23 0700)  Pulse: 74 (07/31/23 0700)  Resp: 20 (07/31/23 0755)  BP: 132/77 (07/31/23 0700)  SpO2: 96 % (07/31/23 0602) Vital Signs (24h Range):  Temp:  [98.8 °F (37.1 °C)-99.1 °F (37.3 °C)] 99.1 °F (37.3 °C)  Pulse:  [72-76] 74  Resp:  [18-20] 20  SpO2:  [95 %-96 %] 96 %  BP: (132-147)/(64-77) 132/77     Weight: 112.9 kg (248 lb 14.4 oz)     Body mass index is 38.98 kg/m².    No intake or output data in the 24 hours ending 07/31/23 0906     Ortho/SPM Exam     Gen:  No acute distress, well-developed, well nourished.  CV:  Peripherally well-perfused. 2+ radial pulses, symmetric.  Respiratory:  Normal respiratory effort. No accessory muscle use.   Head/Neck:  Normocephalic.  Atraumatic. Sclera anicteric. TM. Neck supple.  Neuro: CN 2-12 grossly intact. No FND. Awake. Alert. Oriented to person, place, time, and situation.   Abdomen: Soft, NTND.      MSK:  Left Upper Extremity  Inspection  - Splint removed  - Skin intact throughout, no open wounds  - WWP, hand discoloration patient complains of has resolved  - No swelling, no blistering, skin wrinkles to palpatino  - No ecchymosis, erythema, or signs of cellulitis  Palpation  - TTP over the elbow, no TTP over the hand, wrist, humerus, or shoulder  Range of motion  - AROM and PROM of the shoulder, wrist, and hand intact  - ROM at the elbow limited due to pain  Stability  - No evidence of joint dislocation or abnormal laxity   Neurovascular  - AIN/PIN/Radial/Median/Ulnar  "Nerves assessed in isolation without deficit  - Able to give thumbs up, make "OK" sign, cross IF/LF, abduct/adduct fingers, make fist  - SILT throughout  - Compartments soft  - Radial artery palpated  - Capillary Refill <3s  - Muscle tone normal      Significant Labs: All pertinent labs within the past 24 hours have been reviewed.    Significant Imaging: X-Ray: I have reviewed all pertinent results/findings and my personal findings are:  XR of right forearm and elbow prior to splint removal are unchanged from post reduction films. XR after new splint application show appropriate stabilization and no acute changes to fracture architecture.   I have reviewed and interpreted all pertinent imaging results/findings.    Assessment/Plan:     Closed fracture of left proximal radius and ulna  Darrian Velez is a 68 y.o. male with recent fall resulting in left proximal ulna and radial head fracture for which she was seen and splinted on 07/29 who returns with complaints of splint tightness.  Splint is removed compartments soft compressible, skin is wrinkled bowl without blisters or swelling at this time.  Closed, and VI.  WWP.    - vital signs stable, afebrile  - new splint applied, x-ray remains consistent  - pain control per Emergency Department  - patient consented for eventual surgery which will be planned outpatient    Clinic message for follow up, they will reach out to patient        MARIAA BONILLA MD  Orthopedics  [facility] - Emergency Dept      "

## 2023-07-31 NOTE — DISCHARGE INSTRUCTIONS
Home Care Instructions:  - Medications: Continue taking your home medications as prescribed    Follow-Up Plan:  - Follow-up with: Orthopedic surgery  - Additional testing and/or evaluation will be directed by your primary doctor    Return to the Emergency Department for symptoms including but not limited to: worsening symptoms, severe back pain, shortness of breath or chest pain, vomiting with inability to hold down fluids, blood in vomit or poop, fevers greater than 100.4°F, passing out/fainting/unconsciousness, or other concerning symptoms.

## 2023-07-31 NOTE — ASSESSMENT & PLAN NOTE
Darrian Velez is a 68 y.o. male with recent fall resulting in left proximal ulna and radial head fracture for which she was seen and splinted on 07/29 who returns with complaints of splint tightness.  Splint is removed compartments soft compressible, skin is wrinkled bowl without blisters or swelling at this time.  Closed, and VI.  WWP.    - vital signs stable, afebrile  - new splint applied, x-ray remains consistent  - pain control per Emergency Department  - patient consented for eventual surgery which will be planned outpatient    Clinic message for follow up, they will reach out to patient

## 2023-07-31 NOTE — HPI
Darrian Velez is a 68 y.o. male with PMH significant for hypertension and recent ground level fall resulting in left proximal ulna and radial head fracture for which he was seen at [facility] main ED on 07/29/2023 who is presenting today with complaints of swelling and color change to the hand that he attributes to a tight splint. Patient denies numbness and tingling. Denies any other musculoskeletal pain or injuries.  Walks w/out assisted devices at baseline. Doesn't take any anticoagulation at baseline. Last ate 7:00 PM yesteray.  They deny IV drug use.  They deny tobacco use.   They deny alcohol use.   They deny immunosuppressant medications.  They deny chemotherapy.  They deny radiation therapy.

## 2023-07-31 NOTE — TELEPHONE ENCOUNTER
Called and Informed patient of appointment on 8/4/23 at 7:30 am. Patient states verbal understanding and has no further questions.

## 2023-07-31 NOTE — Clinical Note
"Darrian"Zeb Velez was seen and treated in our emergency department on 7/31/2023.  He may return to work on 08/14/2023.  Patient may return later than the given date based on the recommendations of his orthopedic surgeon.      If you have any questions or concerns, please don't hesitate to call.      Mia Maharaj MD"

## 2023-08-02 NOTE — PROGRESS NOTES
Subjective:     Patient ID: Darrian Velez is a 68 y.o. male.    Chief Complaint: No chief complaint on file.      Principal Orthopedic Problem: left proximal radius and ulnar fracture      Relevant Medical History: according to chart    PMHX:  hypertension, hyperlipidemia    Lives at home with wife  Occupation:    Prior to injury  Independent community ambulator, gait aids   Denies history of stroke, heart attack, cancer, blood clot, diabetes  Denies tobacco use  Denied chronic pain medication use prior to injury      HPI    Patient is a 68 year old male who presented to the ED on 07/30/23 with right forearm pain after fall from standing.   RADS: segmental proximal ulna fracture with questionable intra-articular extension along with a radial head fracture and possible avulsion fracture of the radial tuberosity  Patient has been treated in a splint.   He stated that he is doing ok. Pain is controlled. He has been in split and sling. No numbness or tingling.       Review of Systems   Constitutional: Negative for chills and fever.   Cardiovascular:  Negative for chest pain.   Respiratory:  Negative for cough and shortness of breath.    Skin:  Negative for color change, dry skin, itching, nail changes, poor wound healing and rash.   Musculoskeletal:  Positive for falls.        Left proximal radius and ulna fracture    Neurological:  Negative for dizziness.   Psychiatric/Behavioral:  Negative for altered mental status. The patient is not nervous/anxious.    All other systems reviewed and are negative.      Objective:       Ortho/SPM Exam       LUE:  Inspection: Skin intact throughout, no open wounds  Swelling present along the proximal forearm  No ecchymosis  Splint removed compartments soft compressible, skin is wrinkled    Palpation: TTP at elbow; otherwise non-TTP throughout.  Palpable swelling most notably along mobile wad and dorsal compartment, but all compartments are easily  "compressible     ROM: AROM and PROM of the shoulder, hand intact without pain.  Range of motion at elbow limited secondary to pain   Neuro: AIN/PIN/Radial/Median/Ulnar Nerves assessed in isolation without deficit.  Able to give thumbs up, make "OK" sign, cross IF/LF, abduct/adduct fingers, make fist   SILT M/U/R nerve distributions.    Vascular: Radial artery palpated 2+. Capillary refill <3s.          RADS: reviewed by myself:   comminuted fracture of the proximal left ulna involving the olecranon process and proximal shaft, with no significant displacement/angulation of the major fragments.   nondisplaced/nonangulated left radial head fracture.    Assessment:     Encounter Diagnosis   Name Primary?    Closed fracture of proximal end of left radius and ulna, initial encounter Yes       Plan:      Discussed treatment options with patient. Operative vs non-operative treatment discussed with patient. Recommend operative intervention. Patient agreed.    PLAN: ORIF with possible radial head replacement on 08/07/23      - splint   - rest ice and elevation to reduce swelling.    Discussed proper and consistent elevation of extremities, above the level of the heart, while at rest, to help control/improve edema and decrease pain.  - NWB,  - Pain medication: refill needed,    - Discussed pain medication refill policy.      - consents signed,    - lab, chest x-ray and EKG ordered  previously , results in chart  - not taking blood thinners       -Discussed COVID19 with the patient, they are aware of our current policies and procedures, were given the option of delaying surgery, and they elect to proceed.      Pre, leah, and post operative procedure and expectations were discussed.  Questions were answered. The patient has been educated and is ready to proceed with surgery.  Approximately 30 minutes was spent discussing surgical outcomes, plans, procedures, pre, leah, and post operative expectations and care. The risks, " benefits and alternatives to surgery were discussed with the patient at great length.  These include bleeding, infection, vessel/nerve damage, pain, numbness, tingling, complex regional pain syndrome, hardware/surgical failure, need for further surgery, malunion, nonunion, DVT, PE, arthritis and death. He also understands that the risks of surgery may be greater for some patients due to health or lifestyle issues, such as a current condition or a history of heart disease, obesity, clotting disorders, recurrent infections, smoking, sedentary lifestyle, or noncompliance with medications, therapy, or followup. The degree of the increased risk is hard to estimate with any degree of precision.  Patient states an understanding and wishes to proceed with surgery.   All questions were answered.  No guarantees were implied or stated.  Informed consent was obtained  The patient will contact us if the have any questions, concerns, and changes in their medical condition prior to surgery.

## 2023-08-03 NOTE — PRE-PROCEDURE INSTRUCTIONS
Preop instructions: NPO solids/ milk products after midnight and clears up to 2 hours before arrival (clear liquids are: water, apple juice, Gatorade & Jell-O), shower instructions, directions, leave all valuables at home, wear comfortable clothes, medication instructions explained. Patient stated an understanding.     Patient denies any side effects or issues with anesthesia or sedation.    Patient does not know arrival time. Explained that this information comes from the surgeon's office and if they have not heard from them by 3pm tomorrow, to call surgeon's office. Patient stated an understanding.

## 2023-08-04 ENCOUNTER — ANESTHESIA EVENT (OUTPATIENT)
Dept: SURGERY | Facility: HOSPITAL | Age: 68
End: 2023-08-04
Payer: COMMERCIAL

## 2023-08-04 ENCOUNTER — TELEPHONE (OUTPATIENT)
Dept: ORTHOPEDICS | Facility: CLINIC | Age: 68
End: 2023-08-04

## 2023-08-04 ENCOUNTER — OFFICE VISIT (OUTPATIENT)
Dept: ORTHOPEDICS | Facility: CLINIC | Age: 68
End: 2023-08-04
Payer: COMMERCIAL

## 2023-08-04 VITALS
BODY MASS INDEX: 39.06 KG/M2 | HEIGHT: 67 IN | RESPIRATION RATE: 18 BRPM | WEIGHT: 248.88 LBS | DIASTOLIC BLOOD PRESSURE: 75 MMHG | SYSTOLIC BLOOD PRESSURE: 144 MMHG | HEART RATE: 81 BPM

## 2023-08-04 DIAGNOSIS — S52.102A CLOSED FRACTURE OF PROXIMAL END OF LEFT RADIUS AND ULNA, INITIAL ENCOUNTER: Primary | ICD-10-CM

## 2023-08-04 DIAGNOSIS — S52.002A CLOSED FRACTURE OF PROXIMAL END OF LEFT RADIUS AND ULNA, INITIAL ENCOUNTER: Primary | ICD-10-CM

## 2023-08-04 PROCEDURE — 99999 PR PBB SHADOW E&M-EST. PATIENT-LVL III: ICD-10-PCS | Mod: PBBFAC,,, | Performed by: PHYSICIAN ASSISTANT

## 2023-08-04 PROCEDURE — 99204 OFFICE O/P NEW MOD 45 MIN: CPT | Mod: S$GLB,,, | Performed by: PHYSICIAN ASSISTANT

## 2023-08-04 PROCEDURE — 99204 PR OFFICE/OUTPT VISIT, NEW, LEVL IV, 45-59 MIN: ICD-10-PCS | Mod: S$GLB,,, | Performed by: PHYSICIAN ASSISTANT

## 2023-08-04 PROCEDURE — 99999 PR PBB SHADOW E&M-EST. PATIENT-LVL III: CPT | Mod: PBBFAC,,, | Performed by: PHYSICIAN ASSISTANT

## 2023-08-04 RX ORDER — METHOCARBAMOL 500 MG/1
500 TABLET, FILM COATED ORAL 3 TIMES DAILY
Qty: 42 TABLET | Refills: 0 | Status: SHIPPED | OUTPATIENT
Start: 2023-08-04 | End: 2023-08-18

## 2023-08-04 RX ORDER — ACETAMINOPHEN 500 MG
1000 TABLET ORAL EVERY 8 HOURS
Qty: 180 TABLET | Refills: 0 | Status: SHIPPED | OUTPATIENT
Start: 2023-08-04 | End: 2023-09-18 | Stop reason: SDUPTHER

## 2023-08-04 RX ORDER — OXYCODONE HYDROCHLORIDE 5 MG/1
5 TABLET ORAL EVERY 4 HOURS PRN
Qty: 42 TABLET | Refills: 0 | Status: SHIPPED | OUTPATIENT
Start: 2023-08-04 | End: 2023-08-11

## 2023-08-04 NOTE — H&P
Subjective:      Patient ID: Darrian Velez is a 68 y.o. male.    Chief Complaint: Pain, Injury, and Swelling of the Left Shoulder and Swelling, Pain, and Injury of the Left Elbow      Principal Orthopedic Problem: left proximal radius and ulnar fracture      Relevant Medical History: according to chart    PMHX:  hypertension, hyperlipidemia    Lives at home with wife  Occupation:    Prior to injury  Independent community ambulator, gait aids   Denies history of stroke, heart attack, cancer, blood clot, diabetes  Denies tobacco use  Denied chronic pain medication use prior to injury      HPI    Patient is a 68 year old male who presented to the ED on 07/30/23 with right forearm pain after fall from standing.   RADS: segmental proximal ulna fracture with questionable intra-articular extension along with a radial head fracture and possible avulsion fracture of the radial tuberosity  Patient has been treated in a splint.     Past Medical History:   Diagnosis Date    ALLERGIC RHINITIS     Hx of colonic polyps 4/05/2012    Tubular adenoma    Hyperlipidemia     Hypertension     MRSA infection     left medial thigh abscess & cellulitis    Personal history of kidney stones      Past Surgical History:   Procedure Laterality Date    COLONOSCOPY N/A 7/22/2020    Procedure: COLONOSCOPY;  Surgeon: Donato Shetty MD;  Location: [facility];  Service: Endoscopy;  Laterality: N/A;    HERNIA REPAIR      umbilical hernia repair with mesh    VASECTOMY       Social History     Occupational History    Occupation:    Tobacco Use    Smoking status: Never    Smokeless tobacco: Never   Substance and Sexual Activity    Alcohol use: Not Currently     Comment: occasionally     Drug use: No    Sexual activity: Not on file      ROS  Current Outpatient Medications on File Prior to Visit   Medication Sig Dispense Refill    atorvastatin (LIPITOR) 10 MG tablet Take 1 tablet (10 mg  "total) by mouth every evening. 90 tablet 0    fenofibrate micronized (LOFIBRA) 134 MG Cap Take 1 capsule (134 mg total) by mouth daily with breakfast. 90 capsule 3    losartan-hydrochlorothiazide 100-12.5 mg (HYZAAR) 100-12.5 mg Tab Take 1 tablet by mouth once daily. 90 tablet 0    methocarbamoL (ROBAXIN) 500 MG Tab Take 2 tablets (1,000 mg total) by mouth 3 (three) times daily. for 7 days 42 tablet 0    oxyCODONE-acetaminophen (PERCOCET) 7.5-325 mg per tablet Take 1 tablet by mouth every 4 (four) hours as needed for Pain. 11 tablet 0    potassium chloride SA (K-DUR,KLOR-CON) 20 MEQ tablet Take 1 tablet (20 mEq total) by mouth once daily. 90 tablet 0    [DISCONTINUED] albuterol (PROVENTIL/VENTOLIN HFA) 90 mcg/actuation inhaler Inhale 2 puffs into the lungs every 6 (six) hours as needed for Wheezing. 6.7 g 0    [DISCONTINUED] ASPIRIN (ASPIR-81 ORAL)       [DISCONTINUED] fluticasone propionate (FLONASE) 50 mcg/actuation nasal spray 1 spray (50 mcg total) by Each Nostril route once daily. 9.9 mL 3    [DISCONTINUED] garlic 500 mg Cap Take 1 capsule by mouth once daily.      [DISCONTINUED] multivitamin (THERAGRAN) per tablet Take 1 tablet by mouth once daily.        [DISCONTINUED] OMEGA-3 FATTY ACIDS/FISH OIL (OMEGA 3 FISH OIL ORAL) Take by mouth. 1 Capsule By mouth Twice a day       [DISCONTINUED] cetirizine (ZYRTEC) 10 MG tablet Take 1 tablet (10 mg total) by mouth once daily. for 10 days 10 tablet 0     No current facility-administered medications on file prior to visit.     Review of patient's allergies indicates:   Allergen Reactions    No known allergies          Objective:      Vitals:    08/04/23 0717   BP: (!) 144/75   Pulse: 81   Resp: 18   Weight: 112.9 kg (248 lb 14.4 oz)   Height: 5' 7" (1.702 m)     Ortho Exam     Gen: WD, WN in NAD   HEENT: NC/AT   Heart: RR   Resp: unlabored breathing   Skin: intact, no lesions pertinent to the surgery site    Assessment:       1. Closed fracture of proximal end of left " radius and ulna, initial encounter          Plan:       Surgical intervention per .

## 2023-08-04 NOTE — TELEPHONE ENCOUNTER
Spoke with pt.  Advised NPO after midnight Sunday.  Arrival time of 5:00 am on Monday @ [facility].  Pt verbalized understanding.

## 2023-08-04 NOTE — PROGRESS NOTES
Patient presented to cast room for removal of a plaster sugar tong splint with stirrups. Splint was removed, skin intact, no abnormalities noted. After exam, patient was placed back into a well padded and well molded plaster sugar tong splint with stirrups. Splint care instructions were discussed, all questions answered, patient verbalized understanding.

## 2023-08-04 NOTE — H&P (VIEW-ONLY)
Subjective:      Patient ID: Darrian Velez is a 68 y.o. male.    Chief Complaint: Pain, Injury, and Swelling of the Left Shoulder and Swelling, Pain, and Injury of the Left Elbow      Principal Orthopedic Problem: left proximal radius and ulnar fracture      Relevant Medical History: according to chart    PMHX:  hypertension, hyperlipidemia    Lives at home with wife  Occupation:    Prior to injury  Independent community ambulator, gait aids   Denies history of stroke, heart attack, cancer, blood clot, diabetes  Denies tobacco use  Denied chronic pain medication use prior to injury      HPI    Patient is a 68 year old male who presented to the ED on 07/30/23 with right forearm pain after fall from standing.   RADS: segmental proximal ulna fracture with questionable intra-articular extension along with a radial head fracture and possible avulsion fracture of the radial tuberosity  Patient has been treated in a splint.     Past Medical History:   Diagnosis Date    ALLERGIC RHINITIS     Hx of colonic polyps 4/05/2012    Tubular adenoma    Hyperlipidemia     Hypertension     MRSA infection     left medial thigh abscess & cellulitis    Personal history of kidney stones      Past Surgical History:   Procedure Laterality Date    COLONOSCOPY N/A 7/22/2020    Procedure: COLONOSCOPY;  Surgeon: Donato Shetty MD;  Location: [facility];  Service: Endoscopy;  Laterality: N/A;    HERNIA REPAIR      umbilical hernia repair with mesh    VASECTOMY       Social History     Occupational History    Occupation:    Tobacco Use    Smoking status: Never    Smokeless tobacco: Never   Substance and Sexual Activity    Alcohol use: Not Currently     Comment: occasionally     Drug use: No    Sexual activity: Not on file      ROS  Current Outpatient Medications on File Prior to Visit   Medication Sig Dispense Refill    atorvastatin (LIPITOR) 10 MG tablet Take 1 tablet (10 mg  "total) by mouth every evening. 90 tablet 0    fenofibrate micronized (LOFIBRA) 134 MG Cap Take 1 capsule (134 mg total) by mouth daily with breakfast. 90 capsule 3    losartan-hydrochlorothiazide 100-12.5 mg (HYZAAR) 100-12.5 mg Tab Take 1 tablet by mouth once daily. 90 tablet 0    methocarbamoL (ROBAXIN) 500 MG Tab Take 2 tablets (1,000 mg total) by mouth 3 (three) times daily. for 7 days 42 tablet 0    oxyCODONE-acetaminophen (PERCOCET) 7.5-325 mg per tablet Take 1 tablet by mouth every 4 (four) hours as needed for Pain. 11 tablet 0    potassium chloride SA (K-DUR,KLOR-CON) 20 MEQ tablet Take 1 tablet (20 mEq total) by mouth once daily. 90 tablet 0    [DISCONTINUED] albuterol (PROVENTIL/VENTOLIN HFA) 90 mcg/actuation inhaler Inhale 2 puffs into the lungs every 6 (six) hours as needed for Wheezing. 6.7 g 0    [DISCONTINUED] ASPIRIN (ASPIR-81 ORAL)       [DISCONTINUED] fluticasone propionate (FLONASE) 50 mcg/actuation nasal spray 1 spray (50 mcg total) by Each Nostril route once daily. 9.9 mL 3    [DISCONTINUED] garlic 500 mg Cap Take 1 capsule by mouth once daily.      [DISCONTINUED] multivitamin (THERAGRAN) per tablet Take 1 tablet by mouth once daily.        [DISCONTINUED] OMEGA-3 FATTY ACIDS/FISH OIL (OMEGA 3 FISH OIL ORAL) Take by mouth. 1 Capsule By mouth Twice a day       [DISCONTINUED] cetirizine (ZYRTEC) 10 MG tablet Take 1 tablet (10 mg total) by mouth once daily. for 10 days 10 tablet 0     No current facility-administered medications on file prior to visit.     Review of patient's allergies indicates:   Allergen Reactions    No known allergies          Objective:      Vitals:    08/04/23 0717   BP: (!) 144/75   Pulse: 81   Resp: 18   Weight: 112.9 kg (248 lb 14.4 oz)   Height: 5' 7" (1.702 m)     Ortho Exam     Gen: WD, WN in NAD   HEENT: NC/AT   Heart: RR   Resp: unlabored breathing   Skin: intact, no lesions pertinent to the surgery site    Assessment:       1. Closed fracture of proximal end of left " radius and ulna, initial encounter          Plan:       Surgical intervention per .

## 2023-08-07 ENCOUNTER — ANESTHESIA (OUTPATIENT)
Dept: SURGERY | Facility: HOSPITAL | Age: 68
End: 2023-08-07
Payer: COMMERCIAL

## 2023-08-07 ENCOUNTER — HOSPITAL ENCOUNTER (OUTPATIENT)
Facility: HOSPITAL | Age: 68
Discharge: HOME | End: 2023-08-08
Attending: ORTHOPAEDIC SURGERY | Admitting: ORTHOPAEDIC SURGERY
Payer: COMMERCIAL

## 2023-08-07 DIAGNOSIS — S52.102A CLOSED FRACTURE OF PROXIMAL END OF LEFT RADIUS AND ULNA, INITIAL ENCOUNTER: Primary | ICD-10-CM

## 2023-08-07 DIAGNOSIS — S52.002A CLOSED FRACTURE OF PROXIMAL END OF LEFT RADIUS AND ULNA, INITIAL ENCOUNTER: Primary | ICD-10-CM

## 2023-08-07 PROCEDURE — 36000711: Performed by: ORTHOPAEDIC SURGERY

## 2023-08-07 PROCEDURE — 71000033 HC RECOVERY, INTIAL HOUR: Performed by: ORTHOPAEDIC SURGERY

## 2023-08-07 PROCEDURE — 76942 ECHO GUIDE FOR BIOPSY: CPT

## 2023-08-07 PROCEDURE — 63600175 PHARM REV CODE 636 W HCPCS

## 2023-08-07 PROCEDURE — 24685 PR OPEN TX ULNAR FRACTURE PROX END: ICD-10-PCS | Mod: 51,LT,, | Performed by: ORTHOPAEDIC SURGERY

## 2023-08-07 PROCEDURE — 71000016 HC POSTOP RECOV ADDL HR: Performed by: ORTHOPAEDIC SURGERY

## 2023-08-07 PROCEDURE — 71000039 HC RECOVERY, EACH ADD'L HOUR: Performed by: ORTHOPAEDIC SURGERY

## 2023-08-07 PROCEDURE — 63600175 PHARM REV CODE 636 W HCPCS: Performed by: ORTHOPAEDIC SURGERY

## 2023-08-07 PROCEDURE — 71000015 HC POSTOP RECOV 1ST HR: Performed by: ORTHOPAEDIC SURGERY

## 2023-08-07 PROCEDURE — 25000003 PHARM REV CODE 250

## 2023-08-07 PROCEDURE — D9220A PRA ANESTHESIA: Mod: ,,, | Performed by: ANESTHESIOLOGY

## 2023-08-07 PROCEDURE — 24685 OPTX ULNAR FX PROX END W/FIX: CPT | Mod: 51,LT,, | Performed by: ORTHOPAEDIC SURGERY

## 2023-08-07 PROCEDURE — 63600175 PHARM REV CODE 636 W HCPCS: Performed by: ANESTHESIOLOGY

## 2023-08-07 PROCEDURE — C1713 ANCHOR/SCREW BN/BN,TIS/BN: HCPCS | Performed by: ORTHOPAEDIC SURGERY

## 2023-08-07 PROCEDURE — 27000221 HC OXYGEN, UP TO 24 HOURS

## 2023-08-07 PROCEDURE — 25000003 PHARM REV CODE 250: Performed by: STUDENT IN AN ORGANIZED HEALTH CARE EDUCATION/TRAINING PROGRAM

## 2023-08-07 PROCEDURE — 37000009 HC ANESTHESIA EA ADD 15 MINS: Performed by: ORTHOPAEDIC SURGERY

## 2023-08-07 PROCEDURE — C1769 GUIDE WIRE: HCPCS | Performed by: ORTHOPAEDIC SURGERY

## 2023-08-07 PROCEDURE — D9220A PRA ANESTHESIA: ICD-10-PCS | Mod: ,,, | Performed by: ANESTHESIOLOGY

## 2023-08-07 PROCEDURE — 36000710: Performed by: ORTHOPAEDIC SURGERY

## 2023-08-07 PROCEDURE — 25000003 PHARM REV CODE 250: Performed by: ORTHOPAEDIC SURGERY

## 2023-08-07 PROCEDURE — 63600175 PHARM REV CODE 636 W HCPCS: Performed by: STUDENT IN AN ORGANIZED HEALTH CARE EDUCATION/TRAINING PROGRAM

## 2023-08-07 PROCEDURE — 24666 OPTX RADIAL HEAD/NCK FX RPLC: CPT | Mod: LT,,, | Performed by: ORTHOPAEDIC SURGERY

## 2023-08-07 PROCEDURE — 37000008 HC ANESTHESIA 1ST 15 MINUTES: Performed by: ORTHOPAEDIC SURGERY

## 2023-08-07 PROCEDURE — 99900035 HC TECH TIME PER 15 MIN (STAT)

## 2023-08-07 PROCEDURE — 24666 PR OPEN TX RADIAL HEAD/NECK FRACTURE PROSTHETIC: ICD-10-PCS | Mod: LT,,, | Performed by: ORTHOPAEDIC SURGERY

## 2023-08-07 PROCEDURE — 27201423 OPTIME MED/SURG SUP & DEVICES STERILE SUPPLY: Performed by: ORTHOPAEDIC SURGERY

## 2023-08-07 PROCEDURE — 94761 N-INVAS EAR/PLS OXIMETRY MLT: CPT

## 2023-08-07 DEVICE — IMPLANTABLE DEVICE: Type: IMPLANTABLE DEVICE | Site: ARM | Status: FUNCTIONAL

## 2023-08-07 DEVICE — SCREW BONE NL HEXALOBE 3.5 X 1: Type: IMPLANTABLE DEVICE | Site: ARM | Status: FUNCTIONAL

## 2023-08-07 DEVICE — SCREW BONE LOCK HEXALOBE 2.7 X: Type: IMPLANTABLE DEVICE | Site: ARM | Status: FUNCTIONAL

## 2023-08-07 DEVICE — SCREW BNE N LOK HEXLB 3.5X14: Type: IMPLANTABLE DEVICE | Site: ARM | Status: FUNCTIONAL

## 2023-08-07 DEVICE — SCREW BONE NL HEXALOBE 3.5 X 2: Type: IMPLANTABLE DEVICE | Site: ARM | Status: FUNCTIONAL

## 2023-08-07 DEVICE — SCREW LOCK HEXALOBE 2.7 X 16MM: Type: IMPLANTABLE DEVICE | Site: ARM | Status: FUNCTIONAL

## 2023-08-07 RX ORDER — CEFAZOLIN SODIUM 1 G/3ML
INJECTION, POWDER, FOR SOLUTION INTRAMUSCULAR; INTRAVENOUS
Status: DISCONTINUED | OUTPATIENT
Start: 2023-08-07 | End: 2023-08-07

## 2023-08-07 RX ORDER — MUPIROCIN 20 MG/G
OINTMENT TOPICAL
Status: DISCONTINUED | OUTPATIENT
Start: 2023-08-07 | End: 2023-08-07 | Stop reason: HOSPADM

## 2023-08-07 RX ORDER — PROPOFOL 10 MG/ML
VIAL (ML) INTRAVENOUS
Status: DISCONTINUED | OUTPATIENT
Start: 2023-08-07 | End: 2023-08-07

## 2023-08-07 RX ORDER — BUPIVACAINE HYDROCHLORIDE 5 MG/ML
INJECTION, SOLUTION EPIDURAL; INTRACAUDAL
Status: COMPLETED | OUTPATIENT
Start: 2023-08-07 | End: 2023-08-07

## 2023-08-07 RX ORDER — ROCURONIUM BROMIDE 10 MG/ML
INJECTION, SOLUTION INTRAVENOUS
Status: DISCONTINUED | OUTPATIENT
Start: 2023-08-07 | End: 2023-08-07

## 2023-08-07 RX ORDER — METHOCARBAMOL 500 MG/1
500 TABLET, FILM COATED ORAL 3 TIMES DAILY
Status: DISCONTINUED | OUTPATIENT
Start: 2023-08-07 | End: 2023-08-08 | Stop reason: HOSPADM

## 2023-08-07 RX ORDER — TRANEXAMIC ACID 100 MG/ML
INJECTION, SOLUTION INTRAVENOUS
Status: DISCONTINUED | OUTPATIENT
Start: 2023-08-07 | End: 2023-08-07

## 2023-08-07 RX ORDER — HYDROMORPHONE HYDROCHLORIDE 1 MG/ML
0.2 INJECTION, SOLUTION INTRAMUSCULAR; INTRAVENOUS; SUBCUTANEOUS EVERY 5 MIN PRN
Status: DISCONTINUED | OUTPATIENT
Start: 2023-08-07 | End: 2023-08-07 | Stop reason: HOSPADM

## 2023-08-07 RX ORDER — ACETAMINOPHEN 325 MG/1
650 TABLET ORAL EVERY 8 HOURS
Status: DISCONTINUED | OUTPATIENT
Start: 2023-08-07 | End: 2023-08-08 | Stop reason: HOSPADM

## 2023-08-07 RX ORDER — ASPIRIN 81 MG/1
81 TABLET ORAL 2 TIMES DAILY
Status: DISCONTINUED | OUTPATIENT
Start: 2023-08-07 | End: 2023-08-08 | Stop reason: HOSPADM

## 2023-08-07 RX ORDER — ONDANSETRON 2 MG/ML
INJECTION INTRAMUSCULAR; INTRAVENOUS
Status: DISCONTINUED | OUTPATIENT
Start: 2023-08-07 | End: 2023-08-07

## 2023-08-07 RX ORDER — DEXAMETHASONE SODIUM PHOSPHATE 4 MG/ML
INJECTION, SOLUTION INTRA-ARTICULAR; INTRALESIONAL; INTRAMUSCULAR; INTRAVENOUS; SOFT TISSUE
Status: DISCONTINUED | OUTPATIENT
Start: 2023-08-07 | End: 2023-08-07

## 2023-08-07 RX ORDER — CELECOXIB 200 MG/1
200 CAPSULE ORAL DAILY
Status: DISCONTINUED | OUTPATIENT
Start: 2023-08-08 | End: 2023-08-08 | Stop reason: HOSPADM

## 2023-08-07 RX ORDER — FENTANYL CITRATE 50 UG/ML
INJECTION, SOLUTION INTRAMUSCULAR; INTRAVENOUS
Status: DISCONTINUED | OUTPATIENT
Start: 2023-08-07 | End: 2023-08-07

## 2023-08-07 RX ORDER — OXYCODONE HYDROCHLORIDE 5 MG/1
5 TABLET ORAL EVERY 4 HOURS PRN
Status: DISCONTINUED | OUTPATIENT
Start: 2023-08-07 | End: 2023-08-08 | Stop reason: HOSPADM

## 2023-08-07 RX ORDER — LOSARTAN POTASSIUM AND HYDROCHLOROTHIAZIDE 12.5; 1 MG/1; MG/1
1 TABLET ORAL DAILY
Status: DISCONTINUED | OUTPATIENT
Start: 2023-08-08 | End: 2023-08-08 | Stop reason: HOSPADM

## 2023-08-07 RX ORDER — LIDOCAINE HYDROCHLORIDE 10 MG/ML
1 INJECTION, SOLUTION EPIDURAL; INFILTRATION; INTRACAUDAL; PERINEURAL ONCE AS NEEDED
Status: COMPLETED | OUTPATIENT
Start: 2023-08-07 | End: 2023-08-07

## 2023-08-07 RX ORDER — VANCOMYCIN HYDROCHLORIDE 1 G/20ML
INJECTION, POWDER, LYOPHILIZED, FOR SOLUTION INTRAVENOUS
Status: DISCONTINUED | OUTPATIENT
Start: 2023-08-07 | End: 2023-08-07 | Stop reason: HOSPADM

## 2023-08-07 RX ORDER — MIDAZOLAM HYDROCHLORIDE 1 MG/ML
INJECTION, SOLUTION INTRAMUSCULAR; INTRAVENOUS
Status: DISCONTINUED | OUTPATIENT
Start: 2023-08-07 | End: 2023-08-07

## 2023-08-07 RX ORDER — SODIUM CHLORIDE 0.9 % (FLUSH) 0.9 %
10 SYRINGE (ML) INJECTION
Status: DISCONTINUED | OUTPATIENT
Start: 2023-08-07 | End: 2023-08-07 | Stop reason: HOSPADM

## 2023-08-07 RX ORDER — SODIUM CHLORIDE 9 MG/ML
INJECTION, SOLUTION INTRAVENOUS CONTINUOUS
Status: DISCONTINUED | OUTPATIENT
Start: 2023-08-07 | End: 2023-08-08 | Stop reason: HOSPADM

## 2023-08-07 RX ORDER — LIDOCAINE HYDROCHLORIDE 20 MG/ML
INJECTION INTRAVENOUS
Status: DISCONTINUED | OUTPATIENT
Start: 2023-08-07 | End: 2023-08-07

## 2023-08-07 RX ORDER — HALOPERIDOL 5 MG/ML
0.5 INJECTION INTRAMUSCULAR EVERY 10 MIN PRN
Status: DISCONTINUED | OUTPATIENT
Start: 2023-08-07 | End: 2023-08-07 | Stop reason: HOSPADM

## 2023-08-07 RX ADMIN — PROPOFOL 130 MG: 10 INJECTION, EMULSION INTRAVENOUS at 07:08

## 2023-08-07 RX ADMIN — ROCURONIUM BROMIDE 20 MG: 10 INJECTION INTRAVENOUS at 08:08

## 2023-08-07 RX ADMIN — VANCOMYCIN HYDROCHLORIDE 1000 MG: 1 INJECTION, POWDER, LYOPHILIZED, FOR SOLUTION INTRAVENOUS at 07:08

## 2023-08-07 RX ADMIN — SUGAMMADEX 300 MG: 100 INJECTION, SOLUTION INTRAVENOUS at 11:08

## 2023-08-07 RX ADMIN — ROCURONIUM BROMIDE 60 MG: 10 INJECTION INTRAVENOUS at 07:08

## 2023-08-07 RX ADMIN — ASPIRIN 81 MG: 81 TABLET, COATED ORAL at 08:08

## 2023-08-07 RX ADMIN — SODIUM CHLORIDE: 0.9 INJECTION, SOLUTION INTRAVENOUS at 06:08

## 2023-08-07 RX ADMIN — SODIUM CHLORIDE: 9 INJECTION, SOLUTION INTRAVENOUS at 06:08

## 2023-08-07 RX ADMIN — HALOPERIDOL LACTATE 0.5 MG: 5 INJECTION, SOLUTION INTRAMUSCULAR at 12:08

## 2023-08-07 RX ADMIN — LIDOCAINE HYDROCHLORIDE 80 MG: 20 INJECTION INTRAVENOUS at 07:08

## 2023-08-07 RX ADMIN — CEFAZOLIN 2 G: 2 INJECTION, POWDER, FOR SOLUTION INTRAMUSCULAR; INTRAVENOUS at 05:08

## 2023-08-07 RX ADMIN — HYDROMORPHONE HYDROCHLORIDE 0.2 MG: 1 INJECTION, SOLUTION INTRAMUSCULAR; INTRAVENOUS; SUBCUTANEOUS at 12:08

## 2023-08-07 RX ADMIN — LIDOCAINE HYDROCHLORIDE 1 MG: 10 INJECTION, SOLUTION EPIDURAL; INFILTRATION; INTRACAUDAL; PERINEURAL at 06:08

## 2023-08-07 RX ADMIN — FENTANYL CITRATE 100 MCG: 50 INJECTION, SOLUTION INTRAMUSCULAR; INTRAVENOUS at 06:08

## 2023-08-07 RX ADMIN — BUPIVACAINE HYDROCHLORIDE 40 ML: 5 INJECTION, SOLUTION EPIDURAL; INTRACAUDAL; PERINEURAL at 06:08

## 2023-08-07 RX ADMIN — CEFAZOLIN 2 G: 330 INJECTION, POWDER, FOR SOLUTION INTRAMUSCULAR; INTRAVENOUS at 07:08

## 2023-08-07 RX ADMIN — SODIUM CHLORIDE, SODIUM GLUCONATE, SODIUM ACETATE, POTASSIUM CHLORIDE, MAGNESIUM CHLORIDE, SODIUM PHOSPHATE, DIBASIC, AND POTASSIUM PHOSPHATE: .53; .5; .37; .037; .03; .012; .00082 INJECTION, SOLUTION INTRAVENOUS at 10:08

## 2023-08-07 RX ADMIN — ACETAMINOPHEN 650 MG: 325 TABLET ORAL at 05:08

## 2023-08-07 RX ADMIN — MUPIROCIN: 20 OINTMENT TOPICAL at 06:08

## 2023-08-07 RX ADMIN — VANCOMYCIN HYDROCHLORIDE 1000 MG: 1 INJECTION, POWDER, LYOPHILIZED, FOR SOLUTION INTRAVENOUS at 06:08

## 2023-08-07 RX ADMIN — TRANEXAMIC ACID 1000 MG: 100 INJECTION INTRAVENOUS at 10:08

## 2023-08-07 RX ADMIN — METHOCARBAMOL 500 MG: 500 TABLET ORAL at 10:08

## 2023-08-07 RX ADMIN — DEXAMETHASONE SODIUM PHOSPHATE 4 MG: 4 INJECTION, SOLUTION INTRAMUSCULAR; INTRAVENOUS at 07:08

## 2023-08-07 RX ADMIN — ONDANSETRON 4 MG: 2 INJECTION INTRAMUSCULAR; INTRAVENOUS at 10:08

## 2023-08-07 RX ADMIN — MIDAZOLAM HYDROCHLORIDE 2 MG: 1 INJECTION, SOLUTION INTRAMUSCULAR; INTRAVENOUS at 06:08

## 2023-08-07 RX ADMIN — ACETAMINOPHEN 650 MG: 325 TABLET ORAL at 09:08

## 2023-08-07 RX ADMIN — METHOCARBAMOL 500 MG: 500 TABLET ORAL at 05:08

## 2023-08-07 NOTE — OP NOTE
OP NOTE    DOS:  08/07/2023    Preop Dx: Closed fracture of left proximal ulna and radial head    Postop Dx: Closed fracture of left proximal ulna and radial head    Procedure: Open reduction internal fixation complex left olecranon/proximal ulna - 53581    Open treatment of left radial head fracture with excision and radial head replacement - 51276    Surgeon: Santosh Zavala M.D.    Asst:  Santosh Spence M.D    Anesthesia: GETA    EBL:  60 cc    IVF:  1300 cc crystalloid    Implants: Acumed long olecranon plate.  Acumed radial head replacement size 24 with +4 9 mm stem    Specimens: None    Findings: Stable after fixation.    Dispo:  To PACU extubated/stable       Indications for Procedure:      68-year-old male sustained a fall from standing on 07/30/2023 resulting in a left proximal ulna/olecranon fracture with radial head fracture.  I am taking him to the operating room for open reduction internal fixation of the proximal ulna as well as probable radial head excision and replacement.  The risks, benefits and alternatives to surgery were discussed the patient prior to going to the operating room.  Informed consent was obtained.    Procedure in Detail:    Patient was identified the preoperative holding area the site was marked.  Regional analgesia was administered.  Patient was wheeled into the operating room and general endotracheal anesthesia induced on the patient's hospital bed.  Preoperative antibiotics were administered to include Ancef and vancomycin.  Patient was then turned into a prone position with all bony prominences well padded and the left upper extremity draped over a radiolucent bump.  The left upper extremity was prepped and draped in sterile fashion to the armpit.  A time-out was undertaken to confirm patient, side, site, surgery, surgeon and the administration of preoperative antibiotics.  All agreed we proceeded.  A sterile tourniquet was placed.  The arm was exsanguinated and the tourniquet  was raised.      I made a straight posterior incision from proximal to the tip of the olecranon down the ulnar shaft.  I incised full-thickness skin flaps medial and lateral.  I incised the fascia through the area over the ulnar crest.  I elevated the FCU and ECU off of the crest.  Proximally, I made a fascial incision between the triceps and the anconeus dissecting down to the annular ligament.  I incised the annular ligament and visualized the radial head.  The entire anterior half of the radial head was impacted and comminuted.  This point it was clear that this needed to be replaced to have stability in this trans olecranon radial head fracture situation.    I made a neck cut at the appropriate height and removed the head.  I measured the head and with 24-26 mm would be appropriate.  I broached the canal to a size 9.  I placed the trial 26 mm head with size 9, +4 neck.  This matched the height well but seemed a tiny bit wide.  I turned my attention to the olecranon.      I split the triceps in its midsubstance.  I placed several clamps on the ulna as the patient had intercalary segment anteriorly.  I clamped the fracture on the radial side as well as the ulnar side and going from anterior to posterior to bring the intercalary segment into good position.  I placed several K-wires to hold these.  I then slid a long proximal ulna plate through the triceps split proximally and pushed it down to bone with a tamp.  I then placed K-wires to hold this in place.  I held this against the bone distally and placed a single cortical screw in compression.  I kept the clamps in place and placed 2 compression screws going from posterior to anterior in the intercalary fragment.  I then placed 2 more screws in the shaft distally.    I then turned the arm into a lateral view and placed a home-run screw at the tip of the olecranon into the anterior fragment as well.  I then placed 4 proximal locking screws into the proximal  fragment.  At this point there is a little bit of gapping at the radial cortex.  I then placed a 2.7 mm screw from radial to ulnar holding this in position.  I visualized the entire construct and had good reduction and fixation of the ulna.  The 26 mm radial head prosthesis was a large so I plan to put a 24 for the final.    I turned my attention back to the radius.  I removed the trial radial head prosthesis and placed a 24 mm radial head with a size 9 stem, +4.  I re-reduced the radiocapitellar joint and visualized the entire construct under fluoroscopy with very good fit.  At this point I copiously irrigated normal saline solution.  The tourniquet was let down hemostasis was obtained with electrocautery.     The annular ligament was repaired with 0 Vicryl sutures.  The fascia between the anconeus and the triceps as well as the ECU and FCU were repaired with 0 Vicryl suture gaining a good solid closure.  The next layer fascia was closed 0 Vicryl suture, subcutaneous tissue with 3-0 Vicryl suture and the skin with 3-0 nylon suture in running fashion.  A sterile dressing was applied followed by a long-arm posterior splint with the elbow at 90° and the forearm in neutral alignment.     All instrument and sponge counts were reported correct at the end of the case.  There were no complications.  The patient was returned to supine position on the hospital bed, placed in a sling, extubated, awakened and taken to the recovery room stable condition.      Plan the patient:     I will admit him overnight to ensure there was good pain control.  He will stay in the sling for 2 weeks until suture removal then I will place him in a hinged elbow brace and begin range-of-motion exercises.  X-rays at each follow-up out of splint, three views of the left elbow.  Multimodal pain management limiting narcotics.    Santosh Zavala MD

## 2023-08-07 NOTE — ANESTHESIA PREPROCEDURE EVALUATION
[facility]  Anesthesia Pre-Operative Evaluation         Patient Name/: Darrian Velez, 1955  MRN: [id number]    SUBJECTIVE:     Pre-operative evaluation for Procedure(s) (LRB):  ORIF, FRACTURE, OLECRANON, radial head ORIF vs arthroplasty, non-trauma room (Left)     2023    Darrian Velez is a 68 y.o. male w/ a significant PMHx of HTN, HLD and kidney stones. Pt suffered from a left proximal ulna and radial head fracture for which he was seen at [facility] ED on 23. Pt then was seen in the ED on  with complaints of swelling and color change to his left hand from a tight splint.     Patient now presents for the above procedure(s).    ________________________________________  Results for orders placed during the hospital encounter of 05/15/23    Echo    Interpretation Summary  · The left ventricle is normal in size with concentric remodeling and normal systolic function.  · The estimated ejection fraction is 65%.  · Indeterminate left ventricular diastolic function.  · With normal right ventricular systolic function.  · The estimated PA systolic pressure is 26 mmHg.  · Intermediate central venous pressure (8 mmHg).    ________________________________________    Prev airway: None documented.    LDA: None documented.       Drips: None documented.      Patient Active Problem List   Diagnosis    Hypertension    ALLERGIC RHINITIS    Hx of colonic polyps    Personal history of kidney stones    Hyperlipidemia    Ventral hernia, recurrent    Personal history of colonic polyps    Colon cancer screening    Closed fracture of left proximal radius and ulna       Review of patient's allergies indicates:   Allergen Reactions    No known allergies        Current Inpatient Medications:       No current facility-administered medications on file prior to encounter.     Current Outpatient Medications on File Prior to Encounter   Medication Sig Dispense Refill    atorvastatin  (LIPITOR) 10 MG tablet Take 1 tablet (10 mg total) by mouth every evening. 90 tablet 0    fenofibrate micronized (LOFIBRA) 134 MG Cap Take 1 capsule (134 mg total) by mouth daily with breakfast. 90 capsule 3    losartan-hydrochlorothiazide 100-12.5 mg (HYZAAR) 100-12.5 mg Tab Take 1 tablet by mouth once daily. 90 tablet 0    oxyCODONE-acetaminophen (PERCOCET) 7.5-325 mg per tablet Take 1 tablet by mouth every 4 (four) hours as needed for Pain. 11 tablet 0    potassium chloride SA (K-DUR,KLOR-CON) 20 MEQ tablet Take 1 tablet (20 mEq total) by mouth once daily. 90 tablet 0    methocarbamoL (ROBAXIN) 500 MG Tab Take 2 tablets (1,000 mg total) by mouth 3 (three) times daily. for 7 days 42 tablet 0       Past Surgical History:   Procedure Laterality Date    COLONOSCOPY N/A 7/22/2020    Procedure: COLONOSCOPY;  Surgeon: Donato Shetty MD;  Location: [facility];  Service: Endoscopy;  Laterality: N/A;    HERNIA REPAIR      umbilical hernia repair with mesh    VASECTOMY         Social History:  Tobacco Use: Low Risk  (8/4/2023)    Patient History     Smoking Tobacco Use: Never     Smokeless Tobacco Use: Never     Passive Exposure: Not on file       Alcohol Use: Not on file       OBJECTIVE:     Vital Signs Range:  BMI Readings from Last 1 Encounters:   08/04/23 38.98 kg/m²               Significant Labs:        Component Value Date/Time    WBC 10.90 07/29/2023 1955    HGB 16.0 07/29/2023 1955    HCT 45.5 07/29/2023 1955     07/29/2023 1955     07/29/2023 1955    K 4.1 07/29/2023 1955     07/29/2023 1955    CO2 20 (L) 07/29/2023 1955     (H) 07/29/2023 1955    BUN 14 07/29/2023 1955    CREATININE 1.0 07/29/2023 1955    MG 2.0 04/11/2007 0520    CALCIUM 10.2 07/29/2023 1955    ALBUMIN 4.2 06/02/2023 0710    PROT 7.5 06/02/2023 0710    ALKPHOS 92 06/02/2023 0710    BILITOT 0.5 06/02/2023 0710    AST 27 06/02/2023 0710    ALT 34 06/02/2023 0710    HGBA1C 5.7 (H) 06/02/2023 0710         Please see Results Review for additional labs.     Diagnostic Studies: No relevant studies.    EKG:   Results for orders placed or performed during the hospital encounter of 10/15/13   EKG 12-LEAD    Collection Time: 10/15/13  3:20 PM    Narrative    Test Reason : 272.4  Blood Pressure : / mmHG  Vent. Rate : 093 BPM     Atrial Rate : 093 BPM     P-R Int : 146 ms          QRS Dur : 066 ms      QT Int : 334 ms       P-R-T Axes : 053 021 055 degrees     QTc Int : 415 ms    Normal sinus rhythm  Nonspecific ST and T wave abnormality  Otherwise normal ECG  When compared with ECG of 21-DEC-2009 10:18,  No significant change was found  Confirmed by Kevin Quinones MD ([id number]) on 10/16/2013 6:28:52 PM    Referred By: INDIRA WILKS           Overread By: Kevin Quinones MD       ECHO:  See subjective, if available.      ASSESSMENT/PLAN:           Pre-op Assessment    I have reviewed the Patient Summary Reports.    I have reviewed the NPO Status.   I have reviewed the Medications.     Review of Systems  Anesthesia Hx:  No problems with previous Anesthesia  History of prior surgery of interest to airway management or planning: Previous anesthesia: General Denies Family Hx of Anesthesia complications.   Denies Personal Hx of Anesthesia complications.   Social:  Non-Smoker    Hematology/Oncology:  Hematology Normal   Oncology Normal     EENT/Dental:EENT/Dental Normal   Cardiovascular:  Cardiovascular Normal Hypertension     Pulmonary:  Pulmonary Normal    Renal/:  Renal/ Normal     Hepatic/GI:  Hepatic/GI Normal    Musculoskeletal:  Musculoskeletal Normal    Neurological:  Neurology Normal    Endocrine:  Endocrine Normal    Dermatological:  Skin Normal    Psych:  Psychiatric Normal           Physical Exam  General: Well nourished, Cooperative, Alert and Oriented    Airway:  Mallampati: II / I  Mouth Opening: Normal  TM Distance: Normal  Tongue: Normal  Neck ROM: Normal ROM    Dental:  Intact    Chest/Lungs:  Clear to  auscultation, Normal Respiratory Rate    Heart:  Rate: Normal  Rhythm: Regular Rhythm  Sounds: Normal        Anesthesia Plan  Type of Anesthesia, risks & benefits discussed:    Anesthesia Type: Gen ETT, Regional  Intra-op Monitoring Plan: Standard ASA Monitors  Post Op Pain Control Plan: multimodal analgesia, IV/PO Opioids PRN and peripheral nerve block  Induction:  IV  Airway Plan: Direct and Video, Post-Induction  Informed Consent: Informed consent signed with the Patient and all parties understand the risks and agree with anesthesia plan.  All questions answered.   ASA Score: 2  Day of Surgery Review of History & Physical: H&P Update referred to the surgeon/provider.I have interviewed and examined the patient. I have reviewed the patient's H&P dated:     Ready For Surgery From Anesthesia Perspective.     .

## 2023-08-07 NOTE — PROGRESS NOTES
Dr. Rascon and staff at bedside to do block prior to left arm surgery.  Pt reports decreased pain after block 5/10 and fingers not tingling now.   Voiced understanding to questions answered by Dr. Zavala this am.  Rolled to OR per stretcher without complaint or distress.

## 2023-08-07 NOTE — TRANSFER OF CARE
"Anesthesia Transfer of Care Note    Patient: Darrian Velez    Procedure(s) Performed: Procedure(s) (LRB):  ORIF, FRACTURE, OLECRANON, (Left)  REPLACEMENT, RADIUS, HEAD (Left)    Patient location: PACU    Anesthesia Type: general    Transport from OR: Transported from OR on 6-10 L/min O2 by face mask with adequate spontaneous ventilation    Post pain: adequate analgesia    Post assessment: no apparent anesthetic complications    Post vital signs: stable    Level of consciousness: awake and alert    Nausea/Vomiting: no nausea/vomiting    Complications: none    Transfer of care protocol was followed      Last vitals:   Visit Vitals  BP (!) 155/84 (BP Location: Right arm, Patient Position: Lying)   Pulse 88   Temp 36.1 °C (97 °F) (Temporal)   Resp 16   Ht 5' 7" (1.702 m)   Wt 112.9 kg (248 lb 14.4 oz)   SpO2 96%   BMI 38.98 kg/m²     "

## 2023-08-07 NOTE — NURSING TRANSFER
Nursing Transfer Note      8/7/2023   5:50 PM    Nurse giving handoff:nilo rn   Nurse receiving handoff:5th floor rn     Reason patient is being transferred: post procedure    Transfer To: 510      Transfer via stretcher    Transfer with na    Transported by transport    Medicines sent: na    Any special needs or follow-up needed: na    Patient belongings transferred with patient: No    Chart send with patient: Yes    Notified: spouse    Patient reassessed at: 8/7/23 @0696

## 2023-08-07 NOTE — PLAN OF CARE
Patient transferred back to PACU after doctors decided to admit him. Lesley landis dc. Report given to Theo in PACU. VSS stable. On room air. IV in place. Family returned to waiting area.

## 2023-08-07 NOTE — INTERVAL H&P NOTE
The patient has been examined and the H&P has been reviewed:    I concur with the findings and no changes have occurred since H&P was written.  Left transolecranon radial head fracture.  To OR for ORIF proximal ulna with likely radial head replacement.  The risks, benefits and alternatives to surgery were discussed with the patient at great length.  These include bleeding, infection, vessel/nerve damage, pain, numbness, tingling, complex regional pain syndrome, hardware/surgical failure, need for further surgery, malunion, nonunion, instability/dislocation, stiffness, DVT, PE, arthritis and death.  Patient states an understanding and wishes to proceed with surgery.   All questions were answered.  No guarantees were implied or stated.  Informed consent was obtained.        Active Hospital Problems    Diagnosis  POA    *Closed fracture of left proximal radius and ulna [S52.002A, S52.102A]  Yes      Resolved Hospital Problems   No resolved problems to display.

## 2023-08-08 VITALS
BODY MASS INDEX: 39.06 KG/M2 | HEART RATE: 80 BPM | RESPIRATION RATE: 18 BRPM | DIASTOLIC BLOOD PRESSURE: 65 MMHG | WEIGHT: 248.88 LBS | SYSTOLIC BLOOD PRESSURE: 117 MMHG | HEIGHT: 67 IN | TEMPERATURE: 98 F | OXYGEN SATURATION: 92 %

## 2023-08-08 PROCEDURE — 63600175 PHARM REV CODE 636 W HCPCS: Performed by: STUDENT IN AN ORGANIZED HEALTH CARE EDUCATION/TRAINING PROGRAM

## 2023-08-08 PROCEDURE — 25000003 PHARM REV CODE 250: Performed by: STUDENT IN AN ORGANIZED HEALTH CARE EDUCATION/TRAINING PROGRAM

## 2023-08-08 RX ADMIN — CEFAZOLIN 2 G: 2 INJECTION, POWDER, FOR SOLUTION INTRAMUSCULAR; INTRAVENOUS at 09:08

## 2023-08-08 RX ADMIN — ACETAMINOPHEN 650 MG: 325 TABLET ORAL at 01:08

## 2023-08-08 RX ADMIN — METHOCARBAMOL 500 MG: 500 TABLET ORAL at 09:08

## 2023-08-08 RX ADMIN — ACETAMINOPHEN 650 MG: 325 TABLET ORAL at 05:08

## 2023-08-08 RX ADMIN — CEFAZOLIN 2 G: 2 INJECTION, POWDER, FOR SOLUTION INTRAMUSCULAR; INTRAVENOUS at 12:08

## 2023-08-08 RX ADMIN — ASPIRIN 81 MG: 81 TABLET, COATED ORAL at 09:08

## 2023-08-08 RX ADMIN — CELECOXIB 200 MG: 200 CAPSULE ORAL at 09:08

## 2023-08-08 RX ADMIN — LOSARTAN POTASSIUM AND HYDROCHLOROTHIAZIDE 1 TABLET: 100; 12.5 TABLET, FILM COATED ORAL at 09:08

## 2023-08-08 NOTE — PLAN OF CARE
[facility]  Discharge Assessment    Primary Care Provider: Balwinder Alvarado MD     Discharge Assessment (most recent)       BRIEF DISCHARGE ASSESSMENT - 08/08/23 1051          Discharge Planning    Assessment Type Discharge Planning Assessment     Support Systems Spouse/significant other     Equipment Currently Used at Home none     Current Living Arrangements home     Patient/Family Anticipates Transition to home with family     Patient/Family Anticipated Services at Transition none     DME Needed Upon Discharge  none     Discharge Plan A Home with family                     Spoke with patient, wife and mother-in-law at bedside to complete d/c planning assessment. Patient lives with his spouse in a single story home. Will d/c to his mother-in-laws home as wife has been staying with her recently following an illness. Home is single story. One step to enter. Verified PCP, Pharmacy and health insurance. Patient to d/c home today pending bedside delivery and discharge teaching.    Suzie Hurley RNCM  Case Management  [facility]  [phone]

## 2023-08-08 NOTE — ASSESSMENT & PLAN NOTE
68 y.o. male POD1 s/p L olecranon ORIF and radial head replacement    Pain control: MM  PT/OT: NWB LUE, posterior slab splint to stay in place  DVT PPx:  FCDs at all times when not ambulating  Abx: postop Ancef complete  Labs: none  Drain: none  [place]: dc'd yesterday, urinating without issue    Dispo: Ok for dc today

## 2023-08-08 NOTE — SUBJECTIVE & OBJECTIVE
"Principal Problem:Closed fracture of left proximal radius and ulna    Principal Orthopedic Problem: same as above s/p ORIF olecranon and radial head arthroplasty    Interval History: NAEO. VSS. Pain well controlled. No complaints. Lesley matos'indy yesterday. Plan for DC today.    Review of patient's allergies indicates:   Allergen Reactions    No known allergies        Current Facility-Administered Medications   Medication    0.9%  NaCl infusion    acetaminophen tablet 650 mg    aspirin EC tablet 81 mg    ceFAZolin 2 g in dextrose 5 % in water (D5W) 50 mL IVPB (MB+)    celecoxib capsule 200 mg    losartan-hydrochlorothiazide 100-12.5 mg per tablet 1 tablet    methocarbamoL tablet 500 mg    oxyCODONE immediate release tablet 5 mg     Objective:     Vital Signs (Most Recent):  Temp: 98 °F (36.7 °C) (08/08/23 0727)  Pulse: 80 (08/08/23 0727)  Resp: 18 (08/08/23 0727)  BP: 123/63 (08/08/23 0727)  SpO2: (!) 94 % (08/08/23 0727) Vital Signs (24h Range):  Temp:  [97 °F (36.1 °C)-98.8 °F (37.1 °C)] 98 °F (36.7 °C)  Pulse:  [] 80  Resp:  [9-33] 18  SpO2:  [92 %-98 %] 94 %  BP: (109-167)/(58-93) 123/63     Weight: 112.9 kg (248 lb 14.4 oz)  Height: 5' 7" (170.2 cm)  Body mass index is 38.98 kg/m².      Intake/Output Summary (Last 24 hours) at 8/8/2023 0917  Last data filed at 8/7/2023 1700  Gross per 24 hour   Intake 1000 ml   Output 904 ml   Net 96 ml        Ortho/SPM Exam  LUE:   Posterior slab splint c/d/I  Sling in place  SILT  Axillary/AIN/PIN/Radial/Median/Ulnar Nerves assessed in isolation without deficit     Significant Labs: All pertinent labs within the past 24 hours have been reviewed.    Significant Imaging: I have reviewed and interpreted all pertinent imaging results/findings.  "

## 2023-08-08 NOTE — PLAN OF CARE
[facility]  Discharge Final Note    Primary Care Provider: Balwinder Alvarado MD    Expected Discharge Date: 8/8/2023    Final Discharge Note (most recent)       Final Note - 08/08/23 1454          Final Note    Assessment Type Final Discharge Note     Anticipated Discharge Disposition Home or Self Care     What phone number can be called within the next 1-3 days to see how you are doing after discharge? --   [phone]    Hospital Resources/Appts/Education Provided Appointments scheduled and added to AVS                     Important Message from Medicare           Future Appointments   Date Time Provider Department Center   8/21/2023 10:00 AM Julia Lanier PA-C [facility] ORTHO [address] [place]   9/18/2023 10:30 AM Julia Lanier PA-C [facility] ORTHO [address] [place]   11/27/2023  7:00 AM JHOANA RAZO [facility] [place]   11/27/2023  9:30 AM LAB, [facility] [place]   12/7/2023  2:00 PM Balwinder Alvarado MD [facility]     Patient discharged home to care of family on 8/8/23.    Suzie Hurley RNCM  Case Management  [facility]  [phone]

## 2023-08-08 NOTE — NURSING
Nurses Note -- 4 Eyes      8/7/2023   7:01 PM      Skin assessed during: Admit      [x] No Altered Skin Integrity Present    []Prevention Measures Documented      [] Yes- Altered Skin Integrity Present or Discovered   [] LDA Added if Not in Epic (Describe Wound)   [] New Altered Skin Integrity was Present on Admit and Documented in LDA   [] Wound Image Taken    Wound Care Consulted? No    Attending Nurse:  govind chavez lpn     Second RN/Staff Member:  ghada prettyrn

## 2023-08-08 NOTE — HOSPITAL COURSE
On 8/7, the patient arrived to the [facility] for proper pre-operative management.  Upon completion of pre-operative preparation, the patient was taken back to the operative theatre. L olecranon ORIF and radial head replacement was performed without complication and the patient was transported to the post anesthesia care unit in stable condition.  After appropriate recovery from the anaesthetic agents used during the surgery, the patient was then transported to the hospital inpatient floor.  The interim of the hospital stay from arrival on the floor up to discharge has been uncomplicated. The patient has tolerated regular diet.  The patient's pain has been controlled using a multimodal approach. Currently, the patient's pain is well controlled on an oral regimen.  The patient has been voiding without difficulty. Currently, the patient's progress is sufficient to allow the them to be discharged to home safely.  The patient agrees with this assessment and desires a discharge today.

## 2023-08-08 NOTE — ANESTHESIA POSTPROCEDURE EVALUATION
Anesthesia Post Evaluation    Patient: Darrian Velez    Procedure(s) Performed: Procedure(s) (LRB):  ORIF, FRACTURE, OLECRANON, (Left)  REPLACEMENT, RADIUS, HEAD (Left)    Final Anesthesia Type: general      Patient location during evaluation: PACU  Patient participation: Yes- Able to Participate  Level of consciousness: awake and alert  Post-procedure vital signs: reviewed and stable  Pain control: Pain has been treated.  Airway patency: patent    PONV status: PONV absent or treated.  Anesthetic complications: no      Cardiovascular status: hemodynamically stable  Respiratory status: spontaneous ventilation  Hydration status: euvolemic  Follow-up not needed.          Vitals Value Taken Time   /70 08/07/23 1732   Temp 36.6 °C (97.9 °F) 08/07/23 1350   Pulse 100 08/07/23 1745   Resp 9 08/07/23 1745   SpO2 96 % 08/07/23 1745         Event Time   Out of Recovery 12:29:00         Pain/Kim Score: Pain Rating Prior to Med Admin: 6 (8/8/2023  5:48 AM)  Kim Score: 10 (8/7/2023  1:50 PM)

## 2023-08-08 NOTE — NURSING
Pt received discharge instructions. Verbalized understanding of all instructions. PIV removed, no redness/swelling. Awaiting wheelchair for transport.

## 2023-08-08 NOTE — HPI
Lives at home with wife  Occupation:    Prior to injury  Independent community ambulator, gait aids   Denies history of stroke, heart attack, cancer, blood clot, diabetes  Denies tobacco use  Denied chronic pain medication use prior to injury        HPI     Patient is a 68 year old male who presented to the ED on 07/30/23 with right forearm pain after fall from standing.   RADS: segmental proximal ulna fracture with questionable intra-articular extension along with a radial head fracture and possible avulsion fracture of the radial tuberosity  Patient has been treated in a splint.

## 2023-08-08 NOTE — DISCHARGE SUMMARY
[facility]  Orthopedics  Discharge Summary      Patient Name: Darrian Velez  MRN: [id number]  Admission Date: 8/7/2023  Hospital Length of Stay: 0 days  Discharge Date and Time:  08/08/2023 9:23 AM  Attending Physician: Santosh Zavala MD   Discharging Provider: Santosh Spence MD  Primary Care Provider: Balwinder Alvarado MD    HPI:   Lives at home with wife  Occupation:    Prior to injury  Independent community ambulator, gait aids   Denies history of stroke, heart attack, cancer, blood clot, diabetes  Denies tobacco use  Denied chronic pain medication use prior to injury        HPI     Patient is a 68 year old male who presented to the ED on 07/30/23 with right forearm pain after fall from standing.   RADS: segmental proximal ulna fracture with questionable intra-articular extension along with a radial head fracture and possible avulsion fracture of the radial tuberosity  Patient has been treated in a splint.       Procedure(s) (LRB):  ORIF, FRACTURE, OLECRANON, (Left)  REPLACEMENT, RADIUS, HEAD (Left)      Hospital Course:  On 8/7, the patient arrived to the [facility] for proper pre-operative management.  Upon completion of pre-operative preparation, the patient was taken back to the operative theatre. L olecranon ORIF and radial head replacement was performed without complication and the patient was transported to the post anesthesia care unit in stable condition.  After appropriate recovery from the anaesthetic agents used during the surgery, the patient was then transported to the hospital inpatient floor.  The interim of the hospital stay from arrival on the floor up to discharge has been uncomplicated. The patient has tolerated regular diet.  The patient's pain has been controlled using a multimodal approach. Currently, the patient's pain is well controlled on an oral regimen.  The patient has been voiding without difficulty. Currently, the  patient's progress is sufficient to allow the them to be discharged to home safely.  The patient agrees with this assessment and desires a discharge today.       Goals of Care Treatment Preferences:  Code Status: Full Code          Significant Diagnostic Studies: No pertinent studies.    Pending Diagnostic Studies:     None        Final Active Diagnoses:    Diagnosis Date Noted POA    PRINCIPAL PROBLEM:  Closed fracture of left proximal radius and ulna [S52.002A, S52.102A] 07/30/2023 Yes    Hypertension [I10]  Yes      Problems Resolved During this Admission:      Discharged Condition: good    Disposition: Home or Self Care    Follow Up:    Patient Instructions:      Notify your health care provider if you experience any of the following:  increased confusion or weakness     Notify your health care provider if you experience any of the following:  persistent dizziness, light-headedness, or visual disturbances     Notify your health care provider if you experience any of the following:  worsening rash     Notify your health care provider if you experience any of the following:  severe persistent headache     Notify your health care provider if you experience any of the following:  difficulty breathing or increased cough     Notify your health care provider if you experience any of the following:  redness, tenderness, or signs of infection (pain, swelling, redness, odor or green/yellow discharge around incision site)     Notify your health care provider if you experience any of the following:  severe uncontrolled pain     Notify your health care provider if you experience any of the following:  persistent nausea and vomiting or diarrhea     Notify your health care provider if you experience any of the following:  temperature >100.4     Notify your health care provider if you experience any of the following:  increased confusion or weakness     Notify your health care provider if you experience any of the following:   persistent dizziness, light-headedness, or visual disturbances     Notify your health care provider if you experience any of the following:  worsening rash     Notify your health care provider if you experience any of the following:  severe persistent headache     Notify your health care provider if you experience any of the following:  difficulty breathing or increased cough     Notify your health care provider if you experience any of the following:  redness, tenderness, or signs of infection (pain, swelling, redness, odor or green/yellow discharge around incision site)     Notify your health care provider if you experience any of the following:  severe uncontrolled pain     Notify your health care provider if you experience any of the following:  persistent nausea and vomiting or diarrhea     Notify your health care provider if you experience any of the following:  temperature >100.4     Medications:  Reconciled Home Medications:      Medication List      CONTINUE taking these medications    acetaminophen 500 MG tablet  Commonly known as: TYLENOL  Take 2 tablets (1,000 mg total) by mouth every 8 (eight) hours.     atorvastatin 10 MG tablet  Commonly known as: LIPITOR  Take 1 tablet (10 mg total) by mouth every evening.     fenofibrate micronized 134 MG Cap  Commonly known as: LOFIBRA  Take 1 capsule (134 mg total) by mouth daily with breakfast.     losartan-hydrochlorothiazide 100-12.5 mg 100-12.5 mg Tab  Commonly known as: HYZAAR  Take 1 tablet by mouth once daily.     * methocarbamoL 500 MG Tab  Commonly known as: ROBAXIN  Take 1 tablet (500 mg total) by mouth 3 (three) times daily. for 14 days     oxyCODONE 5 MG immediate release tablet  Commonly known as: ROXICODONE  Take 1 tablet (5 mg total) by mouth every 4 (four) hours as needed for Pain.     oxyCODONE-acetaminophen 7.5-325 mg per tablet  Commonly known as: PERCOCET  Take 1 tablet by mouth every 4 (four) hours as needed for Pain.     potassium chloride SA  20 MEQ tablet  Commonly known as: K-DUR,KLOR-CON  Take 1 tablet (20 mEq total) by mouth once daily.         * This list has 1 medication(s) that are the same as other medications prescribed for you. Read the directions carefully, and ask your doctor or other care provider to review them with you.            ASK your doctor about these medications    * methocarbamoL 500 MG Tab  Commonly known as: ROBAXIN  Take 2 tablets (1,000 mg total) by mouth 3 (three) times daily. for 7 days  Ask about: Should I take this medication?         * This list has 1 medication(s) that are the same as other medications prescribed for you. Read the directions carefully, and ask your doctor or other care provider to review them with you.                Santosh Spence MD  Orthopedics  [facility] - Surgery

## 2023-08-21 ENCOUNTER — HOSPITAL ENCOUNTER (OUTPATIENT)
Dept: RADIOLOGY | Facility: HOSPITAL | Age: 68
Discharge: HOME | End: 2023-08-21
Attending: PHYSICIAN ASSISTANT
Payer: COMMERCIAL

## 2023-08-21 ENCOUNTER — OFFICE VISIT (OUTPATIENT)
Dept: ORTHOPEDICS | Facility: CLINIC | Age: 68
End: 2023-08-21
Payer: COMMERCIAL

## 2023-08-21 VITALS — BODY MASS INDEX: 39.06 KG/M2 | HEIGHT: 67 IN | WEIGHT: 248.88 LBS

## 2023-08-21 DIAGNOSIS — S52.102A CLOSED FRACTURE OF PROXIMAL END OF LEFT RADIUS AND ULNA, INITIAL ENCOUNTER: Primary | ICD-10-CM

## 2023-08-21 DIAGNOSIS — S52.102A CLOSED FRACTURE OF PROXIMAL END OF LEFT RADIUS AND ULNA, INITIAL ENCOUNTER: ICD-10-CM

## 2023-08-21 DIAGNOSIS — S52.002A CLOSED FRACTURE OF PROXIMAL END OF LEFT RADIUS AND ULNA, INITIAL ENCOUNTER: Primary | ICD-10-CM

## 2023-08-21 DIAGNOSIS — S52.002A CLOSED FRACTURE OF PROXIMAL END OF LEFT RADIUS AND ULNA, INITIAL ENCOUNTER: ICD-10-CM

## 2023-08-21 PROCEDURE — 73080 XR ELBOW COMPLETE 3 VIEW LEFT: ICD-10-PCS | Mod: 26,LT,, | Performed by: RADIOLOGY

## 2023-08-21 PROCEDURE — 73080 X-RAY EXAM OF ELBOW: CPT | Mod: 26,LT,, | Performed by: RADIOLOGY

## 2023-08-21 PROCEDURE — 99024 POSTOP FOLLOW-UP VISIT: CPT | Mod: S$GLB,,, | Performed by: PHYSICIAN ASSISTANT

## 2023-08-21 PROCEDURE — 99999 PR PBB SHADOW E&M-EST. PATIENT-LVL III: CPT | Mod: PBBFAC,,, | Performed by: PHYSICIAN ASSISTANT

## 2023-08-21 PROCEDURE — 99999 PR PBB SHADOW E&M-EST. PATIENT-LVL III: ICD-10-PCS | Mod: PBBFAC,,, | Performed by: PHYSICIAN ASSISTANT

## 2023-08-21 PROCEDURE — 99024 PR POST-OP FOLLOW-UP VISIT: ICD-10-PCS | Mod: S$GLB,,, | Performed by: PHYSICIAN ASSISTANT

## 2023-08-21 PROCEDURE — 73080 X-RAY EXAM OF ELBOW: CPT | Mod: TC,LT

## 2023-08-21 NOTE — PROGRESS NOTES
Patient presented to cast room for removal of a plaster cylinder posterior splint of the left arm. Splint was removed, skin and incisions intact, no abnormalities noted.

## 2023-09-18 ENCOUNTER — HOSPITAL ENCOUNTER (OUTPATIENT)
Dept: RADIOLOGY | Facility: HOSPITAL | Age: 68
Discharge: HOME | End: 2023-09-18
Attending: PHYSICIAN ASSISTANT
Payer: COMMERCIAL

## 2023-09-18 ENCOUNTER — OFFICE VISIT (OUTPATIENT)
Dept: ORTHOPEDICS | Facility: CLINIC | Age: 68
End: 2023-09-18
Attending: ORTHOPAEDIC SURGERY
Payer: COMMERCIAL

## 2023-09-18 VITALS — WEIGHT: 248.88 LBS | BODY MASS INDEX: 39.06 KG/M2 | HEIGHT: 67 IN

## 2023-09-18 DIAGNOSIS — S52.102A CLOSED FRACTURE OF PROXIMAL END OF LEFT RADIUS AND ULNA, INITIAL ENCOUNTER: ICD-10-CM

## 2023-09-18 DIAGNOSIS — S52.102A CLOSED FRACTURE OF PROXIMAL END OF LEFT RADIUS AND ULNA, INITIAL ENCOUNTER: Primary | ICD-10-CM

## 2023-09-18 DIAGNOSIS — S52.002A CLOSED FRACTURE OF PROXIMAL END OF LEFT RADIUS AND ULNA, INITIAL ENCOUNTER: Primary | ICD-10-CM

## 2023-09-18 DIAGNOSIS — S52.002A CLOSED FRACTURE OF PROXIMAL END OF LEFT RADIUS AND ULNA, INITIAL ENCOUNTER: ICD-10-CM

## 2023-09-18 PROCEDURE — 99024 POSTOP FOLLOW-UP VISIT: CPT | Mod: S$GLB,,, | Performed by: PHYSICIAN ASSISTANT

## 2023-09-18 PROCEDURE — 93970 EXTREMITY STUDY: CPT | Mod: TC

## 2023-09-18 PROCEDURE — 99999 PR PBB SHADOW E&M-EST. PATIENT-LVL III: CPT | Mod: PBBFAC,,, | Performed by: PHYSICIAN ASSISTANT

## 2023-09-18 PROCEDURE — 93970 US UPPER EXTREMITY VEINS BILATERAL: ICD-10-PCS | Mod: 26,,, | Performed by: RADIOLOGY

## 2023-09-18 PROCEDURE — 73080 X-RAY EXAM OF ELBOW: CPT | Mod: TC,LT

## 2023-09-18 PROCEDURE — 99999 PR PBB SHADOW E&M-EST. PATIENT-LVL III: ICD-10-PCS | Mod: PBBFAC,,, | Performed by: PHYSICIAN ASSISTANT

## 2023-09-18 PROCEDURE — 73080 X-RAY EXAM OF ELBOW: CPT | Mod: 26,LT,, | Performed by: RADIOLOGY

## 2023-09-18 PROCEDURE — 99024 PR POST-OP FOLLOW-UP VISIT: ICD-10-PCS | Mod: S$GLB,,, | Performed by: PHYSICIAN ASSISTANT

## 2023-09-18 PROCEDURE — 73080 XR ELBOW COMPLETE 3 VIEW LEFT: ICD-10-PCS | Mod: 26,LT,, | Performed by: RADIOLOGY

## 2023-09-18 PROCEDURE — 93970 EXTREMITY STUDY: CPT | Mod: 26,,, | Performed by: RADIOLOGY

## 2023-09-18 RX ORDER — ACETAMINOPHEN 500 MG
1000 TABLET ORAL EVERY 8 HOURS
Qty: 180 TABLET | Refills: 0 | Status: SHIPPED | OUTPATIENT
Start: 2023-09-18 | End: 2023-10-18

## 2023-09-18 RX ORDER — METHOCARBAMOL 500 MG/1
500 TABLET, FILM COATED ORAL 3 TIMES DAILY
Qty: 42 TABLET | Refills: 0 | Status: SHIPPED | OUTPATIENT
Start: 2023-09-18 | End: 2023-10-02

## 2023-09-19 ENCOUNTER — CLINICAL SUPPORT (OUTPATIENT)
Dept: REHABILITATION | Facility: HOSPITAL | Age: 68
End: 2023-09-19
Payer: COMMERCIAL

## 2023-09-19 DIAGNOSIS — S52.102A CLOSED FRACTURE OF PROXIMAL END OF LEFT RADIUS AND ULNA, INITIAL ENCOUNTER: ICD-10-CM

## 2023-09-19 DIAGNOSIS — S52.002A CLOSED FRACTURE OF PROXIMAL END OF LEFT RADIUS AND ULNA, INITIAL ENCOUNTER: ICD-10-CM

## 2023-09-19 NOTE — PROGRESS NOTES
Principal Orthopedic Problem: left proximal radius and ulnar fracture      Relevant Medical History: according to chart     PMHX:  hypertension, hyperlipidemia     Lives at home with wife  Occupation:    Prior to injury  Independent community ambulator, gait aids   Denies history of stroke, heart attack, cancer, blood clot, diabetes  Denies tobacco use  Denied chronic pain medication use prior to injury        HPI     Patient is a 68 year old male who presented to the ED on 07/30/23 with right forearm pain after fall from standing.   RADS: segmental proximal ulna fracture with questionable intra-articular extension along with a radial head fracture and possible avulsion fracture of the radial tuberosity  Patient has been treated in a splint.   08/07/23: :   Open reduction internal fixation complex left olecranon/proximal ulna - 83250   Open treatment of left radial head fracture with excision and radial head replacement     Mr. Velez is here today for a post-operative visit    Interval History:  he reports that he is doing ok.   he is at home. he is not participating in PT/OT.   Pain is controlled.  he is  taking pain medication.    he denies fever, chills, and sweats .     Physical exam:    Patient arrives to exam room: walked in .  Patient is  accompanied    Dressing taken down.  Incision is clean, dry and intact.  Sutures removed without difficulty.   Healing well no signs of breakdown or infection.    RADS: All pertinent images were reviewed by myself:   Postoperative changes of internal fixation of the proximal ulna fracture identified.  Postoperative changes of the radial head replacement.  The position and alignment is satisfactory.    Assessment:  Post-op visit ( 2 weeks)    Plan:  Current care, treatment plan, precautions, activity level/ modifications, limitations, rehabilitation exercises and proposed future treatment were discussed with the patient. We discussed the  need to monitor for changes in symptoms and condition and report them to the physician.  Discussed importance of compliance with all appointments and follow up examinations.     WOUND CARE :  - The patient was advised to keep the incision clean and dry for the next 24 hours after which he may wash the area with antibacterial soap in the shower. Will not submerge until the incision is completely healed  -Patient was advised to monitor wound closely and multiple times daily for any problems. Call clinic immediately or report to ED for immediate medical attention for any complications including reopening of wound, drainage, purulence, redness, streaking, odor, pain out of proportion, fever, chills, etc.       ACTIVITY:   - hinged elbow brace and begin range-of-motion exercises  -range of motion as tolerated light      -PT/OT, hold, Patient is responsible to establish and continue care      PAIN MEDICATION:   - Multimodal pain control  - Pain medication: refill was not needed  - Pain medication refill policy provided to patient for review, yes.    - Patient was informed a multi-modal approach is used to treat their pain. With the goal to get off of narcotic pain medication and discontinue as soon as possible.   - ice and elevation to reduce pain and swelling     DVT PROPHYLAXIS:   - Ambulatory    FOLLOW UP:   - Patient will follow up in the clinic in 4 weeks, sooner if any concerns.  - X-ray of his elbow is needed.  OOB  - Pending healing at time consider d/c brace       If there are any questions prior to scheduled follow up, the patient was instructed to contact the office

## 2023-09-19 NOTE — PROGRESS NOTES
Principal Orthopedic Problem: left proximal radius and ulnar fracture      Relevant Medical History: according to chart     PMHX:  hypertension, hyperlipidemia     Lives at home with wife  Occupation:    Prior to injury  Independent community ambulator, gait aids   Denies history of stroke, heart attack, cancer, blood clot, diabetes  Denies tobacco use  Denied chronic pain medication use prior to injury        HPI     Patient is a 68 year old male who presented to the ED on 07/30/23 with right forearm pain after fall from standing.   RADS: segmental proximal ulna fracture with questionable intra-articular extension along with a radial head fracture and possible avulsion fracture of the radial tuberosity  Patient has been treated in a splint.   08/07/23: :   Open reduction internal fixation complex left olecranon/proximal ulna - 40483   Open treatment of left radial head fracture with excision and radial head replacement     Mr. Velez is here today for a post-operative visit    Interval History:  he reports that he is doing ok. Main complaint is of swelling and decreased motion in his hand due to swelling.   he is at home. he is not participating in PT/OT.   Pain is controlled.  he is  taking pain medication.    he denies fever, chills, and sweats .     Physical exam:    Patient arrives to exam room: walked in .brace in place, remove for exam  Patient is  accompanied     Incision is clean, dry and intact.    Healing well no signs of breakdown or infection. Moderate swelling of arm and hand  Range of motion elbow     RADS: All pertinent images were reviewed by myself:   Postoperative changes of internal fixation of the proximal ulna fracture identified.  Radial head replacement noted as before.  The position alignment is satisfactory and unchanged as compared to the previous study    Assessment:  Post-op visit ( 6 weeks)    Plan:  Current care, treatment plan, precautions,  activity level/ modifications, limitations, rehabilitation exercises and proposed future treatment were discussed with the patient. We discussed the need to monitor for changes in symptoms and condition and report them to the physician.  Discussed importance of compliance with all appointments and follow up examinations.     WOUND CARE :  - You may use vitamin E (break the capsule open), aloe, scar cream (mederma) or hand lotion to rub the scar.  You must rub across the scar and in the line of the scar.  The goal is to make the scar not tender and make the scar not adhere (stick to) the tissues below the scar.  There may be some mild discomfort with this initially.  That is okay.         ACTIVITY:   - hinged elbow brace and begin range-of-motion exercises, begin to wean brace  -range of motion as tolerated light      -PT/OT, [facility] , Patient is responsible to establish and continue care      PAIN MEDICATION:   - Multimodal pain control  - Pain medication: refill was not needed  - Pain medication refill policy provided to patient for review, yes.    - Patient was informed a multi-modal approach is used to treat their pain. With the goal to get off of narcotic pain medication and discontinue as soon as possible.   - ice and elevation to reduce pain and swelling     DVT PROPHYLAXIS:   - Ambulatory    OTHER:  Encouraged edema control and massage  Order placed for DVT ultrasound     FOLLOW UP:   - Patient will follow up in the clinic in 6 weeks, sooner if any concerns.  - X-ray of his elbow is needed.  OOB  - Pending healing at time consider d/c brace       If there are any questions prior to scheduled follow up, the patient was instructed to contact the office

## 2023-09-20 ENCOUNTER — CLINICAL SUPPORT (OUTPATIENT)
Dept: REHABILITATION | Facility: HOSPITAL | Age: 68
End: 2023-09-20
Payer: COMMERCIAL

## 2023-09-20 DIAGNOSIS — M79.622 PAIN OF LEFT UPPER ARM: ICD-10-CM

## 2023-09-20 DIAGNOSIS — M25.60 STIFFNESS DUE TO IMMOBILITY: ICD-10-CM

## 2023-09-20 DIAGNOSIS — R53.1 WEAKNESS: ICD-10-CM

## 2023-09-20 DIAGNOSIS — Z74.09 STIFFNESS DUE TO IMMOBILITY: ICD-10-CM

## 2023-09-20 PROCEDURE — 97165 OT EVAL LOW COMPLEX 30 MIN: CPT | Mod: PN

## 2023-09-20 PROCEDURE — 97110 THERAPEUTIC EXERCISES: CPT | Mod: PN

## 2023-09-20 NOTE — PATIENT INSTRUCTIONS
"[facility], OCCUPATIONAL THERAPY  HOME EXERCISE PROGRAM     Complete the following two massages for 5 minutes, 1-2x/day:                                                       Complete the following exercises for 2/15 repetitions, 3-5x/day:     AROM: Elbow Flexion / Extension        Bend and straighten elbow in 3 different positions:   thumb up, palm up, palm down.      AROM: Supination / Pronation   With your elbow by your side, turn your   palm up then turn your palm down.      AROM: Wrist Flexion / Extension               Bend your wrist forward and back as far as possible.          AROM: Wrist Radial / Ulnar Deviation  Bend your wrist from side to side as far as possible.    AROM: Isolated MCP Flexion / Extension ("Wave")   Bend only your large, bottom knuckles. Hold 3 seconds.   Keep the tips of your fingers straight. Straighten fingers.      AROM: Isolated IPJ Flexion / Extension ("Hook")   Bend only your middle and end knuckles. Hold 3 seconds.   Straighten your fingers.       AROM: MCP and PIP Flexion / Extension ("Straight Fist")  Bend your bottom and middle knuckles, keeping the tips of your fingers straight.   Try to touch the pads of your fingers on your palm. Hold 3 seconds.   Straighten your fingers.       AROM: Composite Flexion / Extension ("Full Fist")  Bend every joint in your hand into a fist. Hold 3 seconds.   Straighten your fingers.         AROM: Composte Extension ("Finger Lifts")  Lift your finger off of the table one at a time. Hold 3 seconds.   Relax your finger.      AROM: Abduction / Adduction  With hand flat on table, spread all fingers apart,   then bring them together as close as possible.                        AROM: Opposition   Touch tip of thumb to nail tip of each  finger in turn, making an "O" shape.                              AROM: Composite Flesion ("Pinky Slides")  Touch thumb to tip of small finger. Slide thumb down   small finger into palm.   "

## 2023-09-20 NOTE — PLAN OF CARE
[facility] Occupational Therapy  Initial Evaluation     Name: Darrian Velez  Clinic Number: [id number]    Therapy Diagnosis:   Encounter Diagnoses   Name Primary?    Pain of left upper arm     Weakness     Stiffness due to immobility      Physician: Julia Lanier PA-C    Physician Orders: Eval and Treat  Medical Diagnosis: S52.002A,S52.102A (ICD-10-CM) - Closed fracture of proximal end of left radius and ulna, initial encounter  Surgical Procedure and Date: 08/07/23: :   Open reduction internal fixation complex left olecranon/proximal ulna - 10155   Open treatment of left radial head fracture with excision and radial head replacement / Date of Injury: 7/30/2023  Evaluation Date: 9/20/2023  Insurance Authorization Period Expiration: 12/31/2023  Plan of Care Certification Period: 12/15/2023  Date of Return to MD: 10/23/2023  Visit # / Visits authorized: 1 / 1  FOTO: 1/3    Precautions:  Standard    Time In: 7:00  Time Out: 7:45  Total Appointment Time (timed & untimed codes): 45 minutes    Subjective     History of Current Condition/Mechanism of Injury: Darrian reports: s/p fall resulting in multiple fractures in left arm on 7/30/2023. Pt underwent an Open reduction internal fixation complex left olecranon/proximal ulna, Open treatment of left radial head fracture with excision and radial head replacement 08/07/23 done by . pt presents to clinic this date 6 weeks 2 days post op with hinged elbow brace intact. Pt presents with decreased range of motion and weakness throughout left upper extremity, moderate edema throughout Left upper extremity most significant in hand, incision is healed no signs or symptoms of infection. Pt with reports of moderate pain and numbness and tingling all of which limit him functionally.     Involved Side: Left  Dominant Side: Right  Imaging: Postoperative changes of internal fixation of the proximal ulna fracture identified.  Radial head replacement  noted as before.  The position alignment is satisfactory and unchanged as compared to the previous study    Prior Therapy: none    Pain:  Functional Pain Scale Rating 0-10:   4/10 on average  3/10 at best  5/10 at worst  Location: left elbow and hand  Description: Tingling  Aggravating Factors: Night Time, Extension, Flexing, and Lifting  Easing Factors: pain medication    Occupation:  production control/planner/computer work  Working presently: employed  Duties: Independent    Functional Limitations/Social History:    Previous functional status includes: Independent with all ADLs.     Current Functional Status   Home/Living environment: lives with their family      Limitation of Functional Status as follows:   ADLs/IADLs:     - Feeding: Independent, difficulty with cutting    - Bathing: Independent    - Dressing/Grooming: difficulty with belt, tying shoes, fasteners    - Home Management: unable to perform all home management tasks    - Driving: unable to drive    Patient's Goals for Therapy: to decrease swelling, pain and increase functional use    Past Medical History/Physical Systems Review:   Darrian Velez  has a past medical history of ALLERGIC RHINITIS, colonic polyps, Hyperlipidemia, Hypertension, MRSA infection, and Personal history of kidney stones.    Darrian Velez  has a past surgical history that includes Vasectomy; Hernia repair; Colonoscopy (N/A, 7/22/2020); Open reduction and internal fixation (ORIF) of fracture of olecranon process of ulna (Left, 8/7/2023); and Replacement of head of radius (Left, 8/7/2023).    Darrian has a current medication list which includes the following prescription(s): acetaminophen, atorvastatin, fenofibrate micronized, losartan-hydrochlorothiazide 100-12.5 mg, methocarbamol, oxycodone-acetaminophen, and potassium chloride sa.    Review of patient's allergies indicates:   Allergen Reactions    No known allergies         Objective     Observation/Appearance: Pt to  clinic 6 weeks 2 days with elbow hinged brace intact, 9 inch incision healed no signs and symptoms of infection    Sensation Test: intact    Edema. Measured in centimeters.   9/20/2023 9/20/2023    Left Right   2in. Above elbow 35 32   2in. Below elbow 32 30   Wrist Crease 21 19   MCPs 24 21     Range of Motion/Strength:  Elbow and Wrist ROM. Measured in degrees.   9/20/2023 9/20/2023      Left Right     Elbow Extension/Flexion 30/110 0/140     Supination/Pronation 30/90 90/90     Wrist Ext/Flex 40/20 74/70     Wrist RD/UD 10/14 20/30       Hand ROM. Measured in degrees.   9/20/2023 9/20/2023      Left Right            Index: MP  40 70                PIP     50 95                DIP 15 55            Long:  MP 45 75                PIP 50 100                DIP 25 75            Ring:   MP 45 75                PIP 55 90                DIP 20 75            Small:  MP 30 80                 PIP 55 80                 DIP 35 80            Thumb: MP 50 65                  IP 55 70         Rad ABD 45 45         Pal ABD 60 60            Opposition 1inch to tip of SF, able to touch tip of RF Base of SF        Strength (Dynamometer Rung II) and Pinch Strength (Pinch Gauge)  Measured in pounds.   9/20/2023 9/20/2023    Left Right   Elbow Flexed NT NT   Elbow Extended NT NT   Key Pinch NT NT   3pt Pinch NT NT   2pt Pinch NT NT     Intake Outcome Measure for FOTO Elbow Survey    Therapist reviewed FOTO scores for Darrian Velez on 9/20/2023.   FOTO documents entered into "[employer]" - see Media section.    Intake Score: 87%         Treatment     Total Treatment time (time-based codes) separate from Evaluation: 8 minutes    Darrian received therapeutic activities for 8 minutes including:  -TGE's, thumb range of motion, wrist range of motion, elbow range of motion     Patient Education and Home Exercises      Education provided:   -role of OT, goals for OT, scheduling/cancellations, insurance limitations with patient.  -Additional  Education provided: on current condition and home exercise program     Written Home Exercises Provided: yes.  Exercises were reviewed and Darrian was able to demonstrate them prior to the end of the session.    Darrian demonstrated good  understanding of the education provided.     Pt was advised to perform these exercises free of pain, and to stop performing them if pain occurs.    See EMR under Patient Instructions for exercises provided 9/20/2023.    Assessment     Darrian Velez is a 68 y.o. male referred to outpatient occupational therapy and presents with a medical diagnosis of Left radial and ulna fracture.  s/p fall resulting in multiple fractures in left arm on 7/30/2023. Pt underwent an Open reduction internal fixation complex left olecranon/proximal ulna, Open treatment of left radial head fracture with excision and radial head replacement 08/07/23 done by . pt presents to clinic this date 6 weeks 2 days post op with hinged elbow brace intact. Pt presents with decreased range of motion and weakness throughout left upper extremity, moderate edema throughout Left upper extremity most significant in hand, incision is healed no signs or symptoms of infection. Pt with reports of moderate pain and numbness and tingling all of which limit him functionally.     Assessment of Occupational Performance   Performance Deficits    Physical:  Joint Mobility  Muscle Power/Strength  Muscle Endurance  Skin Integrity/Scar Formation  Edema   Strength  Pinch Strength  Gross Motor Coordination  Fine Motor Coordination  Muscle Tone  Pain    Cognitive:  No Deficits    Psychosocial:    No Deficits     Following medical record review it is determined that pt will benefit from occupational therapy services in order to maximize pain free and/or functional use of left upper extremity . The following goals were discussed with the patient and patient is in agreement with them as to be addressed in the treatment plan. The  patient's rehab potential is Good.     Anticipated barriers to occupational therapy: none    Plan of care discussed with patient: Yes  Patient's spiritual, cultural and educational needs considered and patient is agreeable to the plan of care and goals as stated below:     Medical Necessity is demonstrated by the following  Occupational Profile/History  Co-morbidities and personal factors that may impact the plan of care [x] LOW: Brief chart review  [] MODERATE: Expanded chart review   [] HIGH: Extensive chart review    Moderate / High Support Documentation: N/A     Examination  Performance deficits relating to physical, cognitive or psychosocial skills that result in activity limitations and/or participation restrictions  [x] LOW: addressing 1-3 Performance deficits  [] MODERATE: 3-5 Performance deficits  [] HIGH: 5+ Performance deficits (please support below)    Moderate / High Support Documentation: N/A     Treatment Options [x] LOW: Limited options  [] MODERATE: Several options  [] HIGH: Multiple options      Decision Making/ Complexity Score: low       The following goals were discussed with the patient and patient is in agreement with them as to be addressed in the treatment plan.     Short term Goals:  1) Initiate HEP  2) Pt will increase AROM of Left elbow, wrist and hand by 15 degrees in order to assist with activities of daily living's by 4 weeks.  3) Pt will reduce edema by 1 cm in affected upper extremity by 4 weeks.  4) Pt will reduce pain to less than 4/10 by 4 weeks.  5) Pt will increase functional  strength by 5# in order to A in opening containers for med management or home management tasks by 4 weeks.   6) Patient will be able to achieve less than or equal to 70% on the FOTO, demonstrating overall improved functional ability with upper extremity. (Self-care category)    Long Term Goals:  Goals to be met by discharge:  1) Independent with home exercise program   2) Pt will increase Left upper  extremity range of motion to within functional limits grossly in order to increase functional use for grasp with home management or work related tasks by d/c.   3) Pt will decrease edema to trace or none to increase functional ROM by d/c.   4) Pt will decrease pain to trace or none while completing light home management tasks or work related tasks by d/c.   5) Patient will be able to achieve less than or equal to 25% on the FOTO, demonstrating overall improved functional ability with upper extremity.  (Self-care category)      Plan     Certification Period/Plan of care expiration: 9/20/2023 to 12/15/2023.    Outpatient Occupational Therapy 2 times weekly for 12 weeks to include the following interventions: Manual therapy/joint mobilizations, Modalities for pain management, Therapeutic exercises/activities., Strengthening, Edema Control, Scar Management, Joint Protection, and Energy Conservation.    ACE Luu

## 2023-09-22 ENCOUNTER — CLINICAL SUPPORT (OUTPATIENT)
Dept: REHABILITATION | Facility: HOSPITAL | Age: 68
End: 2023-09-22
Payer: COMMERCIAL

## 2023-09-22 DIAGNOSIS — Z74.09 STIFFNESS DUE TO IMMOBILITY: ICD-10-CM

## 2023-09-22 DIAGNOSIS — M25.60 STIFFNESS DUE TO IMMOBILITY: ICD-10-CM

## 2023-09-22 DIAGNOSIS — M79.622 PAIN OF LEFT UPPER ARM: Primary | ICD-10-CM

## 2023-09-22 DIAGNOSIS — R53.1 WEAKNESS: ICD-10-CM

## 2023-09-22 PROCEDURE — 97140 MANUAL THERAPY 1/> REGIONS: CPT | Mod: PN

## 2023-09-22 PROCEDURE — 97110 THERAPEUTIC EXERCISES: CPT | Mod: PN

## 2023-09-22 PROCEDURE — 97530 THERAPEUTIC ACTIVITIES: CPT | Mod: PN

## 2023-09-22 NOTE — PROGRESS NOTES
"  Occupational Therapy Daily Treatment Note     Name: Darrian Velez  Clinic Number: [id number]    Therapy Diagnosis:   Encounter Diagnoses   Name Primary?    Pain of left upper arm Yes    Weakness     Stiffness due to immobility      Physician: Julia Lanier PA-C    Visit Date: 9/22/2023  Physician Orders: Eval and Treat  Medical Diagnosis: S52.002A,S52.102A (ICD-10-CM) - Closed fracture of proximal end of left radius and ulna, initial encounter  Surgical Procedure and Date: 08/07/23: :   Open reduction internal fixation complex left olecranon/proximal ulna - 82371   Open treatment of left radial head fracture with excision and radial head replacement / Date of Injury: 7/30/2023  Evaluation Date: 9/20/2023  Insurance Authorization Period Expiration: 12/31/2023  Plan of Care Certification Period: 12/15/2023  Date of Return to MD: 10/23/2023  Visit # / Visits authorized: 1 / 1  FOTO: 1/3     Precautions:  Standard     Time In: 10:20 am   Time Out: 1105 am   Total Appointment Time (timed & untimed codes): 45 minutes    Subjective     Pt reports: "Its ok pretty sore though after using it and the exercises and still swollen"  he was compliant with home exercise program given last session.   Response to previous treatment:increased soreness and swelling per pt report   Functional change: none noted    Pain: 4/10  Location: left elbow      Objective      Observation/Appearance: Pt to clinic 6 weeks 2 days with elbow hinged brace intact, 9 inch incision healed no signs and symptoms of infection     Sensation Test: intact     Edema. Measured in centimeters.    9/20/2023 9/20/2023     Left Right   2in. Above elbow 35 32   2in. Below elbow 32 30   Wrist Crease 21 19   MCPs 24 21      Range of Motion/Strength:  Elbow and Wrist ROM. Measured in degrees.    9/20/2023 9/20/2023         Left Right       Elbow Extension/Flexion 30/110 0/140       Supination/Pronation 30/90 90/90       Wrist Ext/Flex 40/20 74/70     "   Wrist RD/UD 10/14 20/30          Hand ROM. Measured in degrees.    9/20/2023 9/20/2023         Left Right                   Index: MP  40 70                  PIP     50 95                  DIP 15 55                   Long:  MP 45 75                  PIP 50 100                  DIP 25 75                   Ring:   MP 45 75                  PIP 55 90                  DIP 20 75                   Small:  MP 30 80                   PIP 55 80                   DIP 35 80                   Thumb: MP 50 65                    IP 55 70           Rad ABD 45 45           Pal ABD 60 60              Opposition 1inch to tip of SF, able to touch tip of RF Base of SF           Strength (Dynamometer Rung II) and Pinch Strength (Pinch Gauge)  Measured in pounds.    9/20/2023 9/20/2023     Left Right   Elbow Flexed NT NT   Elbow Extended NT NT   Key Pinch NT NT   3pt Pinch NT NT   2pt Pinch NT NT      Darrian received the following supervised modalities after being cleared for contradictions for 10 minutes:   -MHP to L x 10 minutes in order to increase extensibility of tissues during MT.       Darrian received the following manual therapy techniques for 15 minutes:   -Pt received retrograde massage as well as scar massage to decrease edema and stiffness for increased ROM. STM performed to decreased stiffness in surrounding musculature.   -Cupping and STM to forearm and elbow scar for edema reduction and AAROM to elbow.    Darrian received therapeutic exercises for 15 minutes including:  -Elbow 3 ways supine and seated   - Wrist 3 ways supine  - Hand AROM tendon glides x 10 each   -    Darrian participated in dynamic functional therapeutic activities to improve functional performance for 15 minutes, including:  -Wrist wheel 2 ways  - Dextraciser unable  -Unable to do pom pom pro/sup just did in hand translation.   - [place] carry 3 laps 1 #  -Isospheres 2 min palm down        Home Exercises and Education Provided     Education provided:   - HEP  in place   - Progress towards goals     Written Home Exercises Provided: Patient instructed to cont prior HEP.  Exercises were reviewed and Darrian was able to demonstrate them prior to the end of the session.  Darrian demonstrated good  understanding of the HEP provided.   .   See EMR under Patient Instructions for exercises provided prior visit.        Assessment     Pt would continue to benefit from skilled OT. He did well for first after session. Main focus on AROM of the elbow, wrist and hand. Edema reduction techs today. OT meant to give edema sleeve to pt but forgot this session. He responded well to MT and AAROM at elbow. Increased elbow ROM to -25 today. He did have difficulty with some tasks specifically forearm rotation. Continue to progress via Protocol.     Darrian is progressing well towards his goals and there are no updates to goals at this time. Pt prognosis is Good.     Pt will continue to benefit from skilled outpatient occupational therapy to address the deficits listed in the problem list on initial evaluation provide pt/family education and to maximize pt's level of independence in the home and community environment.     Anticipated barriers to occupational therapy: Severity of injury, hardware involved, stiffness and swelling,     Pt's spiritual, cultural and educational needs considered and pt agreeable to plan of care and goals.    Goals:  Short term Goals:  1) Initiate HEP  2) Pt will increase AROM of Left elbow, wrist and hand by 15 degrees in order to assist with activities of daily living's by 4 weeks.  3) Pt will reduce edema by 1 cm in affected upper extremity by 4 weeks.  4) Pt will reduce pain to less than 4/10 by 4 weeks.  5) Pt will increase functional  strength by 5# in order to A in opening containers for med management or home management tasks by 4 weeks.   6) Patient will be able to achieve less than or equal to 70% on the FOTO, demonstrating overall improved functional ability with  upper extremity. (Self-care category)     Long Term Goals:  Goals to be met by discharge:  1) Independent with home exercise program   2) Pt will increase Left upper extremity range of motion to within functional limits grossly in order to increase functional use for grasp with home management or work related tasks by d/c.   3) Pt will decrease edema to trace or none to increase functional ROM by d/c.   4) Pt will decrease pain to trace or none while completing light home management tasks or work related tasks by d/c.   5) Patient will be able to achieve less than or equal to 25% on the FOTO, demonstrating overall improved functional ability with upper extremity.  (Self-care category)       Plan   Continue per initial POC  Updates/Grading for next session: Progress as tolerated via protocol.       Gaston Aguillon, OT

## 2023-09-26 NOTE — PROGRESS NOTES
"  Occupational Therapy Daily Treatment Note     Name: Darrian Velez  Clinic Number: [id number]    Therapy Diagnosis:   Encounter Diagnoses   Name Primary?    Pain of left upper arm Yes    Weakness     Stiffness due to immobility      Physician: Julia Lanier PA-C    Visit Date: 9/27/2023  Physician Orders: Eval and Treat  Medical Diagnosis: S52.002A,S52.102A (ICD-10-CM) - Closed fracture of proximal end of left radius and ulna, initial encounter  Surgical Procedure and Date: 08/07/23: :   Open reduction internal fixation complex left olecranon/proximal ulna - 61467   Open treatment of left radial head fracture with excision and radial head replacement / Date of Injury: 7/30/2023  Evaluation Date: 9/20/2023  Insurance Authorization Period Expiration: 12/31/2023  Plan of Care Certification Period: 12/15/2023  Date of Return to MD: 10/23/2023  Visit # / Visits authorized: 2 / 1  FOTO: 1/3     Precautions:  Standard     Time In: 7:00 am   Time Out: 7:45 am   Total Appointment Time (timed & untimed codes): 45 minutes    Subjective     Pt reports: "The elbow feels great, it's this hand that's still swollen."  he was compliant with home exercise program given last session.   Response to previous treatment:moderate pain in hand  Functional change: improved range of motion, and edema throughout left upper extremity     Pain: 5/10  Location: left hand     Objective      Observation/Appearance: Pt to clinic 7 weeks 2 days with elbow hinged brace intact, 9 inch incision healed no signs and symptoms of infection     Sensation Test: intact     Edema. Measured in centimeters.    9/20/2023 9/20/2023 9/27/2023     Left Right Left   2in. Above elbow 35 32 33.5   2in. Below elbow 32 30 31   Wrist Crease 21 19 20   MCPs 24 21 23      Range of Motion/Strength:  Elbow and Wrist ROM. Measured in degrees.    9/20/2023 9/20/2023 9/27/203       Left Right Left      Elbow Extension/Flexion 30/110 0/140  20/120   "   Supination/Pronation 30/90 90/90 50 /90     Wrist Ext/Flex 40/20 74/70 50/50      Wrist RD/UD 10/14 20/30  20/15        Hand ROM. Measured in degrees.    9/20/2023 9/20/2023 9/27/2023        Left Right  Left                 Index: MP  40 70  55                 PIP     50 95  75                DIP 15 55  25                 Long:  MP 45 75 60                 PIP 50 100  70                DIP 25 75  25                 Ring:   MP 45 75  55                PIP 55 90  75                DIP 20 75  20                 Small:  MP 30 80  40                 PIP 55 80  70                 DIP 35 80  40                 Thumb: MP 50 65 50                   IP 55 70  55         Rad ABD 45 45  45         Pal ABD 60 60  60            Opposition 1inch to tip of SF, able to touch tip of RF Base of SF  .5cm to tip of SF         Strength (Dynamometer Rung II) and Pinch Strength (Pinch Gauge)  Measured in pounds.    9/20/2023 9/20/2023     Left Right   Elbow Flexed NT NT   Elbow Extended NT NT   Key Pinch NT NT   3pt Pinch NT NT   2pt Pinch NT NT      Darrian received the following supervised modalities after being cleared for contradictions for 10 minutes:   -Fluidotherapy: To Left upper extremity for 10 min, continuous air, 110 deg, air speed 100 to decrease pain, edema & scar tissue and increased tissue extensibility.    Darrian received the following manual therapy techniques for 15 minutes:   -Pt received retrograde massage as well as scar massage to decrease edema and stiffness for increased ROM. STM performed to decreased stiffness in surrounding musculature.     Darrian received therapeutic exercises for 15 minutes including:  -Elbow 3 ways 2/15  -Wrist 3 flexion/extension   -TGE's x 10 each   -pinky slides 10x  -finger spreads 10x    Darrian participated in dynamic functional therapeutic activities to improve functional performance for 15 minutes, including:  -Wrist wheel 2 ways 2 minutes each  -dexerciser 2 minutes   -farmers carry 3  laps 1 #  -Isospheres 2 min palm down  -light grippers 10x each    Home Exercises and Education Provided     Education provided:   - HEP in place   - Progress towards goals     Written Home Exercises Provided: Patient instructed to cont prior HEP.  Exercises were reviewed and Darrian was able to demonstrate them prior to the end of the session.  Darrian demonstrated good  understanding of the HEP provided.   .   See EMR under Patient Instructions for exercises provided prior visit.      Assessment     Pt with good participation this date. Pain moderate in hand however seems to be due more to selling and discomfort versus pain. Pt demonstrating overall improved range of motion and edema throughout left upper extremity since initial evaluation. Issued compression sleeve for left hand and wrist. Pt able to complete all exercises in a pain free range of motion and with good technique. Pt motivated and meeting post op landmarks.     Darrian is progressing well towards his goals and there are no updates to goals at this time. Pt prognosis is Good.     Pt will continue to benefit from skilled outpatient occupational therapy to address the deficits listed in the problem list on initial evaluation provide pt/family education and to maximize pt's level of independence in the home and community environment.     Anticipated barriers to occupational therapy: Severity of injury, hardware involved, stiffness and swelling,     Pt's spiritual, cultural and educational needs considered and pt agreeable to plan of care and goals.    Goals:  Short term Goals:  1) Initiate home exercise program -met, ongoing  2) Pt will increase AROM of Left elbow, wrist and hand by 15 degrees in order to assist with activities of daily living's by 4 weeks. -met  3) Pt will reduce edema by 1 cm in affected upper extremity by 4 weeks. -met  4) Pt will reduce pain to less than 4/10 by 4 weeks. -Not met, progressing  5) Pt will increase functional  strength by  5# in order to A in opening containers for med management or home management tasks by 4 weeks. -Not met, progressing  6) Patient will be able to achieve less than or equal to 70% on the FOTO, demonstrating overall improved functional ability with upper extremity. (Self-care category) -Not met, progressing     Long Term Goals:  Goals to be met by discharge:  1) Independent with home exercise program -Not met, progressing  2) Pt will increase Left upper extremity range of motion to within functional limits grossly in order to increase functional use for grasp with home management or work related tasks by d/c. -Not met, progressing  3) Pt will decrease edema to trace or none to increase functional ROM by d/c. -Not met, progressing  4) Pt will decrease pain to trace or none while completing light home management tasks or work related tasks by d/c. -Not met, progressing  5) Patient will be able to achieve less than or equal to 25% on the FOTO, demonstrating overall improved functional ability with upper extremity.  (Self-care category) -Not met, progressing     Plan   Continue per initial POC  Updates/Grading for next session: Progress as tolerated via protocol.       ACE Luu

## 2023-09-27 ENCOUNTER — CLINICAL SUPPORT (OUTPATIENT)
Dept: REHABILITATION | Facility: HOSPITAL | Age: 68
End: 2023-09-27
Payer: COMMERCIAL

## 2023-09-27 DIAGNOSIS — Z74.09 STIFFNESS DUE TO IMMOBILITY: ICD-10-CM

## 2023-09-27 DIAGNOSIS — M25.60 STIFFNESS DUE TO IMMOBILITY: ICD-10-CM

## 2023-09-27 DIAGNOSIS — R53.1 WEAKNESS: ICD-10-CM

## 2023-09-27 DIAGNOSIS — M79.622 PAIN OF LEFT UPPER ARM: Primary | ICD-10-CM

## 2023-09-27 PROCEDURE — 97530 THERAPEUTIC ACTIVITIES: CPT | Mod: PN

## 2023-09-27 PROCEDURE — 97140 MANUAL THERAPY 1/> REGIONS: CPT | Mod: PN

## 2023-09-27 PROCEDURE — 97022 WHIRLPOOL THERAPY: CPT | Mod: PN

## 2023-09-27 PROCEDURE — 97110 THERAPEUTIC EXERCISES: CPT | Mod: PN

## 2023-09-28 NOTE — PROGRESS NOTES
"  Occupational Therapy Daily Treatment Note     Name: Darrian Velez  Clinic Number: [id number]    Therapy Diagnosis:   Encounter Diagnoses   Name Primary?    Pain of left upper arm Yes    Weakness     Stiffness due to immobility        Physician: Julia Lanier PA-C    Visit Date: 9/29/2023  Physician Orders: Eval and Treat  Medical Diagnosis: S52.002A,S52.102A (ICD-10-CM) - Closed fracture of proximal end of left radius and ulna, initial encounter  Surgical Procedure and Date: 08/07/23: :   Open reduction internal fixation complex left olecranon/proximal ulna - 97685   Open treatment of left radial head fracture with excision and radial head replacement / Date of Injury: 7/30/2023  Evaluation Date: 9/20/2023  Insurance Authorization Period Expiration: 12/31/2023  Plan of Care Certification Period: 12/15/2023  Date of Return to MD: 10/23/2023  Visit # / Visits authorized: 4 / 11  FOTO: 1/3     Precautions:  Standard     Time In: 11:30 am  Time Out: 12:15 pm  Total Appointment Time (timed & untimed codes): 45 minutes    Subjective     Pt reports: "My elbow doesn't hurt at all, but I'm still having that swelling in my hand."  he was compliant with home exercise program given last session.   Response to previous treatment:moderate pain in hand  Functional change: improved range of motion, and edema throughout left upper extremity     Pain: 3/10  Location: left hand     Objective      Observation/Appearance: Pt to clinic 7 weeks 2 days with elbow hinged brace intact, 9 inch incision healed no signs and symptoms of infection     Sensation Test: intact     Edema. Measured in centimeters.    9/20/2023 9/20/2023 9/27/2023     Left Right Left   2in. Above elbow 35 32 33.5   2in. Below elbow 32 30 31   Wrist Crease 21 19 20   MCPs 24 21 23      Range of Motion/Strength:  Elbow and Wrist ROM. Measured in degrees.    9/20/2023 9/20/2023 9/27/203       Left Right Left      Elbow Extension/Flexion 30/110 0/140  " 20/120     Supination/Pronation 30/90 90/90 50 /90     Wrist Ext/Flex 40/20 74/70 50/50      Wrist RD/UD 10/14 20/30  20/15        Hand ROM. Measured in degrees.    9/20/2023 9/20/2023 9/27/2023        Left Right  Left                 Index: MP  40 70  55                 PIP     50 95  75                DIP 15 55  25                 Long:  MP 45 75 60                 PIP 50 100  70                DIP 25 75  25                 Ring:   MP 45 75  55                PIP 55 90  75                DIP 20 75  20                 Small:  MP 30 80  40                 PIP 55 80  70                 DIP 35 80  40                 Thumb: MP 50 65 50                   IP 55 70  55         Rad ABD 45 45  45         Pal ABD 60 60  60            Opposition 1inch to tip of SF, able to touch tip of RF Base of SF  .5cm to tip of SF         Strength (Dynamometer Rung II) and Pinch Strength (Pinch Gauge)  Measured in pounds.    9/20/2023 9/20/2023     Left Right   Elbow Flexed NT NT   Elbow Extended NT NT   Key Pinch NT NT   3pt Pinch NT NT   2pt Pinch NT NT      Darrian received the following supervised modalities after being cleared for contradictions for 10 minutes:   -Patient tolerates MHP x 10  min in order to decrease pain and increase soft tissue extensibility in preparation for ROM, concurrent with assessment of deficits       Darrian received the following manual therapy techniques for 15 minutes:   -Pt received retrograde massage as well as scar massage to decrease edema and stiffness for increased ROM. STM performed to decreased stiffness in surrounding musculature.     Darrian received therapeutic exercises for 15 minutes including:  -Elbow 3 ways 2/15 1#  -Wrist 3 flexion/extension with  squeeze   -TGE's x 10 each with ADD squeeze   -pinky slides 10x  -finger spreads 10x    Darrian participated in dynamic functional therapeutic activities to improve functional performance for 15 minutes, including:  -Wrist wheel 2 ways 2 minutes  each  -dexerciser 2 minutes   -farmers carry 3 laps 1 #  -Isospheres 2 min palm down  -finger<>palm translation with small poms   - Pink tputty  strengthening (+HEP)    Home Exercises and Education Provided     Education provided:   - HEP in place   - Progress towards goals     Written Home Exercises Provided: Patient instructed to cont prior HEP.  Exercises were reviewed and Darrian was able to demonstrate them prior to the end of the session.  Darrian demonstrated good  understanding of the HEP provided.   .   See EMR under Patient Instructions for exercises provided prior visit.      Assessment     Patient is currently 7w4d p/o. Patient still having a lot of swelling and discomfort limiting he ability to make a composite fist. Patient provided with edema glove to assist with edema reduction. He continues to demo improvement in overall ROM at the elbow, wrist, and digits. No notable pain in the elbow or at the scar. Per protocol, slow progressive strengthening is indicated at this stage of recovery. He tolerates all established exs well with added light resistance to these. Also, patient give pink tputty for  strengthening HEP to promote carryover of strength and ROM gains. Patient would continue to benefit from skilled OT services. Continue skilled OT POC and progress per protocol as tolerated.     Darrian is progressing well towards his goals and there are no updates to goals at this time. Pt prognosis is Good.     Pt will continue to benefit from skilled outpatient occupational therapy to address the deficits listed in the problem list on initial evaluation provide pt/family education and to maximize pt's level of independence in the home and community environment.     Anticipated barriers to occupational therapy: Severity of injury, hardware involved, stiffness and swelling,     Pt's spiritual, cultural and educational needs considered and pt agreeable to plan of care and goals.    Goals:  Short term Goals:  1)  Initiate home exercise program Met, 9/29/2023  2) Pt will increase AROM of Left elbow, wrist and hand by 15 degrees in order to assist with activities of daily living's by 4 weeks. -met  3) Pt will reduce edema by 1 cm in affected upper extremity by 4 weeks. Met, 9/29/2023  4) Pt will reduce pain to less than 4/10 by 4 weeks.Met, 9/29/2023  5) Pt will increase functional  strength by 5# in order to A in opening containers for med management or home management tasks by 4 weeks.Not met 9/29/2023, continue progressing   6) Patient will be able to achieve less than or equal to 70% on the FOTO, demonstrating overall improved functional ability with upper extremity. (Self-care category) Not met 9/29/2023, continue progressing      Long Term Goals:  Goals to be met by discharge:  1) Independent with home exercise program Not met 9/29/2023, continue progressing   2) Pt will increase Left upper extremity range of motion to within functional limits grossly in order to increase functional use for grasp with home management or work related tasks by d/c. Not met 9/29/2023, continue progressing   3) Pt will decrease edema to trace or none to increase functional ROM by d/c. Not met 9/29/2023, continue progressing   4) Pt will decrease pain to trace or none while completing light home management tasks or work related tasks by d/c.Not met 9/29/2023, continue progressing   5) Patient will be able to achieve less than or equal to 25% on the FOTO, demonstrating overall improved functional ability with upper extremity.  (Self-care category) Not met 9/29/2023, continue progressing      Plan   Continue per initial POC  Updates/Grading for next session: Progress as tolerated via protocol. Continue light progressive strengthening       Shavonne Monroe, OT

## 2023-09-29 ENCOUNTER — CLINICAL SUPPORT (OUTPATIENT)
Dept: REHABILITATION | Facility: HOSPITAL | Age: 68
End: 2023-09-29
Payer: COMMERCIAL

## 2023-09-29 DIAGNOSIS — M25.60 STIFFNESS DUE TO IMMOBILITY: ICD-10-CM

## 2023-09-29 DIAGNOSIS — R53.1 WEAKNESS: ICD-10-CM

## 2023-09-29 DIAGNOSIS — Z74.09 STIFFNESS DUE TO IMMOBILITY: ICD-10-CM

## 2023-09-29 DIAGNOSIS — M79.622 PAIN OF LEFT UPPER ARM: Primary | ICD-10-CM

## 2023-09-29 PROCEDURE — 97530 THERAPEUTIC ACTIVITIES: CPT | Mod: PN

## 2023-09-29 PROCEDURE — 97140 MANUAL THERAPY 1/> REGIONS: CPT | Mod: PN

## 2023-09-29 PROCEDURE — 97110 THERAPEUTIC EXERCISES: CPT | Mod: PN

## 2023-09-29 NOTE — PATIENT INSTRUCTIONS
[facility]  OCCUPATIONAL THERAPY  HOME EXERCISE PROGRAM     Complete the following strengthening program 1x/day.                       _                        Shavonne Monroe

## 2023-10-03 NOTE — PROGRESS NOTES
"  Occupational Therapy Daily Treatment Note     Name: Darrian Velez  Clinic Number: [id number]    Therapy Diagnosis:   Encounter Diagnoses   Name Primary?    Pain of left upper arm Yes    Weakness     Stiffness due to immobility      Physician: Julia Lanier PA-C    Visit Date: 10/4/2023  Physician Orders: Eval and Treat  Medical Diagnosis: S52.002A,S52.102A (ICD-10-CM) - Closed fracture of proximal end of left radius and ulna, initial encounter  Surgical Procedure and Date: 08/07/23: :   Open reduction internal fixation complex left olecranon/proximal ulna - 76869   Open treatment of left radial head fracture with excision and radial head replacement / Date of Injury: 7/30/2023  Evaluation Date: 9/20/2023  Insurance Authorization Period Expiration: 12/31/2023  Plan of Care Certification Period: 12/15/2023  Date of Return to MD: 10/23/2023  Visit # / Visits authorized: 4(+1) / 11  FOTO: 2/3     Precautions:  Standard     Time In: 7:00 am  Time Out: 7:50 am  Total Appointment Time (timed & untimed codes): 50 minutes    Subjective     Pt reports: "that glove has really been helping with the swelling."  he was compliant with home exercise program given last session.   Response to previous treatment:low pain  Functional change: improved range of motion, and edema throughout left upper extremity, improved FOTO score    Pain: 2/10  Location: left hand     Objective      Observation/Appearance: Pt to clinic 8 weeks 2 days with elbow hinged brace intact, 9 inch incision healed no signs and symptoms of infection     Sensation Test: intact     Edema. Measured in centimeters.    9/20/2023 9/20/2023 9/27/2023     Left Right Left   2in. Above elbow 35 32 33.5   2in. Below elbow 32 30 31   Wrist Crease 21 19 20   MCPs 24 21 23      Range of Motion/Strength:  Elbow and Wrist ROM. Measured in degrees.    9/20/2023 9/20/2023 9/27/203       Left Right Left      Elbow Extension/Flexion 30/110 0/140  20/120   "   Supination/Pronation 30/90 90/90 50 /90     Wrist Ext/Flex 40/20 74/70 50/50      Wrist RD/UD 10/14 20/30  20/15        Hand ROM. Measured in degrees.    9/20/2023 9/20/2023 9/27/2023        Left Right  Left                 Index: MP  40 70  55                 PIP     50 95  75                DIP 15 55  25                 Long:  MP 45 75 60                 PIP 50 100  70                DIP 25 75  25                 Ring:   MP 45 75  55                PIP 55 90  75                DIP 20 75  20                 Small:  MP 30 80  40                 PIP 55 80  70                 DIP 35 80  40                 Thumb: MP 50 65 50                   IP 55 70  55         Rad ABD 45 45  45         Pal ABD 60 60  60            Opposition 1inch to tip of SF, able to touch tip of RF Base of SF  .5cm to tip of SF         Strength (Dynamometer Rung II) and Pinch Strength (Pinch Gauge)  Measured in pounds.    10/4/2023 10/4/2023     Left Right   Elbow Flexed 20 75   Elbow Extended 15 65   Key Pinch 9 19   3pt Pinch 7 20   2pt Pinch 6 14      Darrian received the following supervised modalities after being cleared for contradictions for 10 minutes:   -Fluidotherapy: To Left upper extremity  for 10 min, continuous air, 110 deg, air speed 100 to decrease pain, edema & scar tissue and increased tissue extensibility.    Darrian received the following manual therapy techniques for 15 minutes:   -Pt received retrograde massage as well as scar massage to decrease edema and stiffness for increased ROM. STM performed to decreased stiffness in surrounding musculature.     Darrian received therapeutic exercises for 15 minutes including:  -Elbow 3 ways 2/15 1#  -pronation/supination 2/15  -Wrist 3 flexion/extension with  squeeze   -TGE's x 10 each with ADD squeeze   -pinky slides 10x  -finger spreads 10x    Darrian participated in dynamic functional therapeutic activities to improve functional performance for 10 minutes, including:  -dexerciser 2  minutes   -Isospheres 2 min palm up  -finger<>palm translation with 9 hole peg 3x  -pom pom pickup green clothespin 1 container  -pom pom pickup brown gripper 1st setting 1 container    Home Exercises and Education Provided     Education provided:   - HEP in place   - Progress towards goals     Written Home Exercises Provided: Patient instructed to cont prior HEP.  Exercises were reviewed and Darrian was able to demonstrate them prior to the end of the session.  Darrian demonstrated good  understanding of the HEP provided.     See EMR under Patient Instructions for exercises provided prior visit.      Assessment     Patient is currently 8w2d p/o. Patient still having a some swelling and discomfort limiting him with composite flexion however significantly improved. Pt reports compliance with edema glove.  Assessed  and pinch strength today. Pt able to complete all exercises in a pain free range of motion and with good technique. Pt very motivated.     Darrian is progressing well towards his goals and there are no updates to goals at this time. Pt prognosis is Good.     Pt will continue to benefit from skilled outpatient occupational therapy to address the deficits listed in the problem list on initial evaluation provide pt/family education and to maximize pt's level of independence in the home and community environment.     Anticipated barriers to occupational therapy: Severity of injury, hardware involved, stiffness and swelling,     Pt's spiritual, cultural and educational needs considered and pt agreeable to plan of care and goals.    Goals:  Short term Goals:  1) Initiate home exercise program Met, 10/4/2023  2) Pt will increase AROM of Left elbow, wrist and hand by 15 degrees in order to assist with activities of daily living's by 4 weeks. -met  3) Pt will reduce edema by 1 cm in affected upper extremity by 4 weeks. Met, 10/4/2023  4) Pt will reduce pain to less than 4/10 by 4 weeks.Met, 10/4/2023  5) Pt will  increase functional  strength by 5# in order to A in opening containers for med management or home management tasks by 4 weeks.Not met 10/4/2023, continue progressing   6) Patient will be able to achieve less than or equal to 70% on the FOTO, demonstrating overall improved functional ability with upper extremity. (Self-care category) met     Long Term Goals:  Goals to be met by discharge:  1) Independent with home exercise program Not met 10/4/2023, continue progressing   2) Pt will increase Left upper extremity range of motion to within functional limits grossly in order to increase functional use for grasp with home management or work related tasks by d/c. Not met 10/4/2023, continue progressing   3) Pt will decrease edema to trace or none to increase functional ROM by d/c. Not met 10/4/2023, continue progressing   4) Pt will decrease pain to trace or none while completing light home management tasks or work related tasks by d/c.Not met 10/4/2023, continue progressing   5) Patient will be able to achieve less than or equal to 25% on the FOTO, demonstrating overall improved functional ability with upper extremity.  (Self-care category) Not met 10/4/2023, continue progressing      Plan   Continue per initial POC  Updates/Grading for next session: Progress as tolerated via protocol. Continue light progressive strengthening       ACE Luu

## 2023-10-04 ENCOUNTER — CLINICAL SUPPORT (OUTPATIENT)
Dept: REHABILITATION | Facility: HOSPITAL | Age: 68
End: 2023-10-04
Payer: COMMERCIAL

## 2023-10-04 DIAGNOSIS — Z74.09 STIFFNESS DUE TO IMMOBILITY: ICD-10-CM

## 2023-10-04 DIAGNOSIS — M79.622 PAIN OF LEFT UPPER ARM: Primary | ICD-10-CM

## 2023-10-04 DIAGNOSIS — R53.1 WEAKNESS: ICD-10-CM

## 2023-10-04 DIAGNOSIS — M25.60 STIFFNESS DUE TO IMMOBILITY: ICD-10-CM

## 2023-10-04 PROCEDURE — 97140 MANUAL THERAPY 1/> REGIONS: CPT | Mod: PN

## 2023-10-04 PROCEDURE — 97022 WHIRLPOOL THERAPY: CPT | Mod: PN

## 2023-10-04 PROCEDURE — 97110 THERAPEUTIC EXERCISES: CPT | Mod: PN

## 2023-10-04 PROCEDURE — 97530 THERAPEUTIC ACTIVITIES: CPT | Mod: PN

## 2023-10-05 NOTE — PROGRESS NOTES
"  Occupational Therapy Daily Treatment Note     Name: Darrian Velez  Clinic Number: [id number]    Therapy Diagnosis:   Encounter Diagnoses   Name Primary?    Pain of left upper arm Yes    Weakness     Stiffness due to immobility        Physician: Julia Lanier PA-C    Visit Date: 10/6/2023  Physician Orders: Eval and Treat  Medical Diagnosis: S52.002A,S52.102A (ICD-10-CM) - Closed fracture of proximal end of left radius and ulna, initial encounter  Surgical Procedure and Date: 08/07/23: :   Open reduction internal fixation complex left olecranon/proximal ulna - 58564   Open treatment of left radial head fracture with excision and radial head replacement / Date of Injury: 7/30/2023  Evaluation Date: 9/20/2023  Insurance Authorization Period Expiration: 12/31/2023  Plan of Care Certification Period: 12/15/2023  Date of Return to MD: 10/23/2023  Visit # / Visits authorized: 5(+1) / 11  FOTO: 2/3     Precautions:  Standard     Time In: 10:30 am  Time Out: 11:15 am  Total Appointment Time (timed & untimed codes): 45 minutes    Subjective     Pt reports: "I've had a little pain here, but it's mainly when I wake up in the morning."  he was compliant with home exercise program given last session.   Response to previous treatment:low pain  Functional change: improved range of motion, and edema throughout left upper extremity, improved FOTO score    Pain: 2/10  Location: left hand     Objective      Observation/Appearance: Pt to clinic 8 weeks 2 days with elbow hinged brace intact, 9 inch incision healed no signs and symptoms of infection     Sensation Test: intact     Edema. Measured in centimeters.    9/20/2023 9/20/2023 9/27/2023     Left Right Left   2in. Above elbow 35 32 33.5   2in. Below elbow 32 30 31   Wrist Crease 21 19 20   MCPs 24 21 23      Range of Motion/Strength:  Elbow and Wrist ROM. Measured in degrees.    9/20/2023 9/20/2023 9/27/203       Left Right Left      Elbow Extension/Flexion 30/110 " 0/140  20/120     Supination/Pronation 30/90 90/90 50 /90     Wrist Ext/Flex 40/20 74/70 50/50      Wrist RD/UD 10/14 20/30  20/15        Hand ROM. Measured in degrees.    9/20/2023 9/20/2023 9/27/2023        Left Right  Left                 Index: MP  40 70  55                 PIP     50 95  75                DIP 15 55  25                 Long:  MP 45 75 60                 PIP 50 100  70                DIP 25 75  25                 Ring:   MP 45 75  55                PIP 55 90  75                DIP 20 75  20                 Small:  MP 30 80  40                 PIP 55 80  70                 DIP 35 80  40                 Thumb: MP 50 65 50                   IP 55 70  55         Rad ABD 45 45  45         Pal ABD 60 60  60            Opposition 1inch to tip of SF, able to touch tip of RF Base of SF  .5cm to tip of SF         Strength (Dynamometer Rung II) and Pinch Strength (Pinch Gauge)  Measured in pounds.    10/4/2023 10/4/2023     Left Right   Elbow Flexed 20 75   Elbow Extended 15 65   Key Pinch 9 19   3pt Pinch 7 20   2pt Pinch 6 14       Treatment      Darrian received the following supervised modalities after being cleared for contradictions for 10 minutes:   -Fluidotherapy: To Left upper extremity  for 10 min, continuous air, 110 deg, air speed 100 to decrease pain, edema & scar tissue and increased tissue extensibility.    Darrian received the following manual therapy techniques for 15 minutes:   -Pt received retrograde massage as well as scar massage to decrease edema and stiffness for increased ROM. STM performed to decreased stiffness in surrounding musculature.     Darrian received therapeutic exercises for 20 minutes including:  -Therapist A PROM  - TGEs in fluido  -Elbow 3 ways 2/15 2#  -pronation/supination 2/15 with hammer   -Wrist 3 flexion/extension 2# eccentric motions   -pinky slides 10x  -finger spreads 10x    Darrian participated in dynamic functional therapeutic activities to improve functional  performance for 10 minutes, including:  -wrist maze 2 minutes   -flexbar emphasis on slow, resisted supination    Home Exercises and Education Provided     Education provided:   - HEP in place   - Progress towards goals     Written Home Exercises Provided: Patient instructed to cont prior HEP.  Exercises were reviewed and Darrian was able to demonstrate them prior to the end of the session.  Darrian demonstrated good  understanding of the HEP provided.     See EMR under Patient Instructions for exercises provided prior visit.      Assessment     Patient is 8w4d p/o. States that he has been using the edema glove given at previous tx session, but still having some ongoing swelling, however he is no longer having the sharp/tingling pain t/o the hand. Still having some tightness in the hand 2/2 edema. He states that pain has recently began this week located at the volar elbow crease with occasional clicking - more so in the morning time. Pain intensifies with resisted elbow flexion and resisted supination. At 8 weeks, patient should be initiating slow progressive strengthening. He tolerates all established exs with a slight addition to fa/elbow ROM. I encourage the use of flexion glove and cryo for ongoing edema reduction techniques. Patient tolerated session well today, no true pain or fatigue noted following tx session today. Patient demos -5/125 deg elbow ext/flex and nearly a full composite fist following. Patient would benefit from ongoing skilled ot services. Continue skilled OT POC and progress per protocol as tolerated.     Darrian is progressing well towards his goals and there are no updates to goals at this time. Pt prognosis is Good.     Pt will continue to benefit from skilled outpatient occupational therapy to address the deficits listed in the problem list on initial evaluation provide pt/family education and to maximize pt's level of independence in the home and community environment.     Anticipated barriers to  occupational therapy: Severity of injury, hardware involved, stiffness and swelling,     Pt's spiritual, cultural and educational needs considered and pt agreeable to plan of care and goals.    Goals:  Short term Goals:  1) Initiate home exercise program Met, 10/6/2023  2) Pt will increase AROM of Left elbow, wrist and hand by 15 degrees in order to assist with activities of daily living's by 4 weeks. Met, 10/6/2023  3) Pt will reduce edema by 1 cm in affected upper extremity by 4 weeks. Met, 10/6/2023  4) Pt will reduce pain to less than 4/10 by 4 weeks.Met, 10/6/2023  5) Pt will increase functional  strength by 5# in order to A in opening containers for med management or home management tasks by 4 weeks.Not met 10/6/2023, continue progressing   6) Patient will be able to achieve less than or equal to 70% on the FOTO, demonstrating overall improved functional ability with upper extremity. (Self-care category) Met, 10/6/2023  Long Term Goals:  Goals to be met by discharge:  1) Independent with home exercise program Not met 10/6/2023, continue progressing   2) Pt will increase Left upper extremity range of motion to within functional limits grossly in order to increase functional use for grasp with home management or work related tasks by d/c. Not met 10/6/2023, continue progressing   3) Pt will decrease edema to trace or none to increase functional ROM by d/c. Not met 10/6/2023, continue progressing   4) Pt will decrease pain to trace or none while completing light home management tasks or work related tasks by d/c.Not met 10/6/2023, continue progressing   5) Patient will be able to achieve less than or equal to 25% on the FOTO, demonstrating overall improved functional ability with upper extremity.  (Self-care category) Not met 10/6/2023, continue progressing      Plan   Continue per initial POC  Updates/Grading for next session: Progress as tolerated via protocol. Continue light progressive strengthening        Shavonne Monroe, OT

## 2023-10-06 ENCOUNTER — CLINICAL SUPPORT (OUTPATIENT)
Dept: REHABILITATION | Facility: HOSPITAL | Age: 68
End: 2023-10-06
Payer: COMMERCIAL

## 2023-10-06 DIAGNOSIS — M25.60 STIFFNESS DUE TO IMMOBILITY: ICD-10-CM

## 2023-10-06 DIAGNOSIS — Z74.09 STIFFNESS DUE TO IMMOBILITY: ICD-10-CM

## 2023-10-06 DIAGNOSIS — R53.1 WEAKNESS: ICD-10-CM

## 2023-10-06 DIAGNOSIS — M79.622 PAIN OF LEFT UPPER ARM: Primary | ICD-10-CM

## 2023-10-06 PROCEDURE — 97140 MANUAL THERAPY 1/> REGIONS: CPT | Mod: PN

## 2023-10-06 PROCEDURE — 97022 WHIRLPOOL THERAPY: CPT | Mod: PN

## 2023-10-06 PROCEDURE — 97530 THERAPEUTIC ACTIVITIES: CPT | Mod: PN

## 2023-10-06 PROCEDURE — 97110 THERAPEUTIC EXERCISES: CPT | Mod: PN

## 2023-10-11 ENCOUNTER — CLINICAL SUPPORT (OUTPATIENT)
Dept: REHABILITATION | Facility: HOSPITAL | Age: 68
End: 2023-10-11
Payer: COMMERCIAL

## 2023-10-11 DIAGNOSIS — M79.622 PAIN OF LEFT UPPER ARM: Primary | ICD-10-CM

## 2023-10-11 DIAGNOSIS — Z74.09 STIFFNESS DUE TO IMMOBILITY: ICD-10-CM

## 2023-10-11 DIAGNOSIS — R53.1 WEAKNESS: ICD-10-CM

## 2023-10-11 DIAGNOSIS — M25.60 STIFFNESS DUE TO IMMOBILITY: ICD-10-CM

## 2023-10-11 PROCEDURE — 97140 MANUAL THERAPY 1/> REGIONS: CPT | Mod: PN

## 2023-10-11 PROCEDURE — 97022 WHIRLPOOL THERAPY: CPT | Mod: PN

## 2023-10-11 PROCEDURE — 97530 THERAPEUTIC ACTIVITIES: CPT | Mod: PN

## 2023-10-11 PROCEDURE — 97110 THERAPEUTIC EXERCISES: CPT | Mod: PN

## 2023-10-11 NOTE — PROGRESS NOTES
"  Occupational Therapy Daily Treatment Note     Name: Darrian Velez  Clinic Number: [id number]    Therapy Diagnosis:   Encounter Diagnoses   Name Primary?    Pain of left upper arm Yes    Weakness     Stiffness due to immobility      Physician: Julia Lanier PA-C    Visit Date: 10/11/2023  Physician Orders: Eval and Treat  Medical Diagnosis: S52.002A,S52.102A (ICD-10-CM) - Closed fracture of proximal end of left radius and ulna, initial encounter  Surgical Procedure and Date: 08/07/23: :   Open reduction internal fixation complex left olecranon/proximal ulna - 85140   Open treatment of left radial head fracture with excision and radial head replacement / Date of Injury: 7/30/2023  Evaluation Date: 9/20/2023  Insurance Authorization Period Expiration: 12/31/2023  Plan of Care Certification Period: 12/15/2023  Date of Return to MD: 10/23/2023  Visit # / Visits authorized: 6(+1) / 11  FOTO: 2/3     Precautions:  Standard     Time In: 7:00 am  Time Out: 7:55 am  Total Appointment Time (timed & untimed codes): 55 minutes    Subjective     Pt reports: "This glove is kind of loose now."  he was compliant with home exercise program given last session.   Response to previous treatment:low pain  Functional change: improved range of motion, and edema throughout left upper extremity    Pain: 1-2/10  Location: left hand     Objective      Observation/Appearance: Pt to clinic 9 weeks 2 days with elbow hinged brace intact, 9 inch incision healed no signs and symptoms of infection     Sensation Test: intact     Edema. Measured in centimeters.    9/20/2023 9/20/2023 9/27/2023 10/11/2023     Left Right Left Left   2in. Above elbow 35 32 33.5 33.5   2in. Below elbow 32 30 31 31   Wrist Crease 21 19 20 20   MCPs 24 21 23 22.5      Range of Motion/Strength:  Elbow and Wrist ROM. Measured in degrees.    9/20/2023 9/20/2023  9/27/203 10/11/2023      Left Right Left   Left   Elbow Extension/Flexion 30/110 0/140  20/120 10/125 "    Supination/Pronation 30/90 90/90 50 /90 55/90    Wrist Ext/Flex 40/20 74/70 50/50  60/50    Wrist RD/UD 10/14 20/30  20/15      20/30      Hand ROM. Measured in degrees.    9/20/2023 9/20/2023 9/27/2023  10/11/2023      Left Right  Left  Left               Index: MP  40 70  55  65               PIP     50 95  75 85               DIP 15 55  25 35                Long:  MP 45 75 60  65               PIP 50 100  70 85               DIP 25 75  25 50                Ring:   MP 45 75  55 75               PIP 55 90  75 90              DIP 20 75  20 40                Small:  MP 30 80  40 80                PIP 55 80  70 80               DIP 35 80  40 60                Thumb: MP 50 65 50  65                 IP 55 70  55 60       Rad ABD 45 45  45 45        Pal ABD 60 60  60  60          Opposition 1inch to tip of SF, able to touch tip of RF Base of SF  .5cm to tip of SF Dip of SF        Strength (Dynamometer Rung II) and Pinch Strength (Pinch Gauge)  Measured in pounds.    10/4/2023 10/4/2023 10/11/2023     Left Right Left   Elbow Flexed 20 75 25   Elbow Extended 15 65 20   Key Pinch 9 19 10   3pt Pinch 7 20 7   2pt Pinch 6 14 6       Treatment      Darrian received the following supervised modalities after being cleared for contradictions for 10 minutes:   -Fluidotherapy: To Left upper extremity  for 10 min, continuous air, 110 deg, air speed 100 to decrease pain, edema & scar tissue and increased tissue extensibility.    Darrian received the following manual therapy techniques for 15 minutes:   -Pt received retrograde massage as well as scar massage to decrease edema and stiffness for increased ROM. STM performed to decreased stiffness in surrounding musculature.     Darrian received therapeutic exercises for 30 minutes including:  -Therapist A PROM  - TGEs in fluido  -Elbow 3 ways 2/15 2#  -pronation/supination 2/15 with hammer   -Wrist 3 flexion/extension 2# eccentric motions   -pinky slides 10x  -finger spreads 10x    Darrian  participated in dynamic functional therapeutic activities to improve functional performance for 10 minutes, including:  -pompom pickup hand gripper second setting 1 container  -pom pom pickup green clothespin 1 container  -flexbar emphasis on slow, resisted supination    Home Exercises and Education Provided     Education provided:   - HEP in place   - Progress towards goals     Written Home Exercises Provided: Patient instructed to cont prior HEP.  Exercises were reviewed and Darrian was able to demonstrate them prior to the end of the session.  Darrian demonstrated good  understanding of the HEP provided.     See EMR under Patient Instructions for exercises provided prior visit.      Assessment   Pt with good participation this date. Pain report remains low and is mostly in hand due to swelling.  Pt reports compliance with edema glove however states the large is loose now. Issued medium which gave pt more compression. Pt's range of motion, strength and edema continue to improve. Pt tolerated progression of treatment well, completing all exercises in a pain free range of motion and with good technique. Pt very motivated and meeting post op landmarks.     Darrian is progressing well towards his goals and there are no updates to goals at this time. Pt prognosis is Good.     Pt will continue to benefit from skilled outpatient occupational therapy to address the deficits listed in the problem list on initial evaluation provide pt/family education and to maximize pt's level of independence in the home and community environment.     Anticipated barriers to occupational therapy: Severity of injury, hardware involved, stiffness and swelling,     Pt's spiritual, cultural and educational needs considered and pt agreeable to plan of care and goals.    Goals:  Short term Goals:  1) Initiate home exercise program Met, 10/11/2023  2) Pt will increase AROM of Left elbow, wrist and hand by 15 degrees in order to assist with activities of  daily living's by 4 weeks. Met, 10/11/2023  3) Pt will reduce edema by 1 cm in affected upper extremity by 4 weeks. Met, 10/11/2023  4) Pt will reduce pain to less than 4/10 by 4 weeks.Met, 10/11/2023  5) Pt will increase functional  strength by 5# in order to A in opening containers for med management or home management tasks by 4 weeks.met  6) Patient will be able to achieve less than or equal to 70% on the FOTO, demonstrating overall improved functional ability with upper extremity. (Self-care category) Met, 10/11/2023  Long Term Goals:  Goals to be met by discharge:  1) Independent with home exercise program Not met 10/11/2023, continue progressing   2) Pt will increase Left upper extremity range of motion to within functional limits grossly in order to increase functional use for grasp with home management or work related tasks by d/c. Not met 10/11/2023, continue progressing   3) Pt will decrease edema to trace or none to increase functional ROM by d/c. Not met 10/11/2023, continue progressing   4) Pt will decrease pain to trace or none while completing light home management tasks or work related tasks by d/c.Not met 10/11/2023, continue progressing   5) Patient will be able to achieve less than or equal to 25% on the FOTO, demonstrating overall improved functional ability with upper extremity.  (Self-care category) Not met 10/11/2023, continue progressing      Plan   Continue per initial POC  Updates/Grading for next session: Progress as tolerated via protocol. Continue light progressive strengthening       ACE Luu

## 2023-10-12 NOTE — PROGRESS NOTES
"  Occupational Therapy Daily Treatment Note     Name: Darrian Velez  Clinic Number: [id number]    Therapy Diagnosis:   Encounter Diagnoses   Name Primary?    Pain of left upper arm Yes    Weakness     Stiffness due to immobility        Physician: Julia Lanier PA-C    Visit Date: 10/13/2023  Physician Orders: Eval and Treat  Medical Diagnosis: S52.002A,S52.102A (ICD-10-CM) - Closed fracture of proximal end of left radius and ulna, initial encounter  Surgical Procedure and Date: 08/07/23: :   Open reduction internal fixation complex left olecranon/proximal ulna - 49296   Open treatment of left radial head fracture with excision and radial head replacement / Date of Injury: 7/30/2023  Evaluation Date: 9/20/2023  Insurance Authorization Period Expiration: 12/31/2023  Plan of Care Certification Period: 12/15/2023  Date of Return to MD: 10/23/2023  Visit # / Visits authorized: 7(+1) / 11  FOTO: 2/3     Precautions:  Standard     Time In: 11:20 am   Time Out: 12:05 pm  Total Appointment Time (timed & untimed codes): 45 minutes    Subjective     Pt reports: "She gave me a medium glove last time, but its still swollen, so I'm going to talk to them when I go to the doctor."   he was compliant with home exercise program given last session.   Response to previous treatment:low pain  Functional change: improved range of motion, and edema throughout left upper extremity    Pain: 2/10  Location: left hand     Objective      Observation/Appearance: Pt to clinic 9 weeks 2 days with elbow hinged brace intact, 9 inch incision healed no signs and symptoms of infection     Sensation Test: intact     Edema. Measured in centimeters.    9/20/2023 9/20/2023 9/27/2023 10/11/2023     Left Right Left Left   2in. Above elbow 35 32 33.5 33.5   2in. Below elbow 32 30 31 31   Wrist Crease 21 19 20 20   MCPs 24 21 23 22.5      Range of Motion/Strength:  Elbow and Wrist ROM. Measured in degrees.    9/20/2023 9/20/2023 9/27/203 " 10/11/2023      Left Right Left   Left   Elbow Extension/Flexion 30/110 0/140  20/120 10/125    Supination/Pronation 30/90 90/90 50 /90 55/90    Wrist Ext/Flex 40/20 74/70 50/50  60/50    Wrist RD/UD 10/14 20/30  20/15      20/30      Hand ROM. Measured in degrees.    9/20/2023 9/20/2023 9/27/2023  10/11/2023      Left Right  Left  Left               Index: MP  40 70  55  65               PIP     50 95  75 85               DIP 15 55  25 35                Long:  MP 45 75 60  65               PIP 50 100  70 85               DIP 25 75  25 50                Ring:   MP 45 75  55 75               PIP 55 90  75 90              DIP 20 75  20 40                Small:  MP 30 80  40 80                PIP 55 80  70 80               DIP 35 80  40 60                Thumb: MP 50 65 50  65                 IP 55 70  55 60       Rad ABD 45 45  45 45        Pal ABD 60 60  60  60          Opposition 1inch to tip of SF, able to touch tip of RF Base of SF  .5cm to tip of SF Dip of SF        Strength (Dynamometer Rung II) and Pinch Strength (Pinch Gauge)  Measured in pounds.    10/4/2023 10/4/2023 10/11/2023     Left Right Left   Elbow Flexed 20 75 25   Elbow Extended 15 65 20   Key Pinch 9 19 10   3pt Pinch 7 20 7   2pt Pinch 6 14 6       Treatment      Darrian received the following supervised modalities after being cleared for contradictions for 10 minutes:   -Patient tolerates MHP x 10 min in order to decrease pain and increase soft tissue extensibility in preparation for ROM, concurrent with assessment of deficits       Darrian received the following manual therapy techniques for 15 minutes:   -Pt received retrograde massage as well as scar massage to decrease edema and stiffness for increased ROM. STM performed to decreased stiffness in surrounding musculature.   Ktape to the dorsal hand     Darrian received therapeutic exercises for 20 minutes including:  -Therapist A PROM  -Elbow 3 ways 2/15 2#  -pronation/supination 2/15 with hammer    -Wrist 3 flexion/extension Gtband eccentric motions   -pinky slides 10x  -finger spreads 10x  -pro/sup with red tband    Darrian participated in dynamic functional therapeutic activities to improve functional performance for 10 minutes, including:  -flexbar emphasis on slow, resisted supination  -pink tputty    -   -intrinsic scrape   -jar turning   -bottle twist   -key turning  -true balance x 2 min  -Wrist dextraciser x 2 min     Home Exercises and Education Provided     Education provided:   - HEP in place   - Progress towards goals     Written Home Exercises Provided: Patient instructed to cont prior HEP.  Exercises were reviewed and Darrian was able to demonstrate them prior to the end of the session.  Darrian demonstrated good  understanding of the HEP provided.     See EMR under Patient Instructions for exercises provided prior visit.      Assessment     Patient tolerated session well today. Main complaint is the swelling in his hand which leads to tightness, but no true pain. Lately he has also been having some discomfort/tightness in the elbow scar as well, but this does not inhibit his performance in B/IADLs. He does note that he has a better ability to open jars and medication bottles. Patient continues to demo good performance in PREs with some mild fatigue following, but no true pain. Most difficulty is noted with resisted supination at the dorsal elbow. Also enforced PREs with emphasis on fxnl  and turning motions. Following tx session, I provide Ktape to the dorsal hand with 50% stretch over the interossei to create a lift for lymphatic drainage to decrease edema. Patient would benefit from ongoing skilled OT services. Continue skilled OT POC and progress per protocol as tolerated.     Darrian is progressing well towards his goals and there are no updates to goals at this time. Pt prognosis is Good.     Pt will continue to benefit from skilled outpatient occupational therapy to address the deficits  listed in the problem list on initial evaluation provide pt/family education and to maximize pt's level of independence in the home and community environment.     Anticipated barriers to occupational therapy: Severity of injury, hardware involved, stiffness and swelling,     Pt's spiritual, cultural and educational needs considered and pt agreeable to plan of care and goals.    Goals:  Short term Goals:  1) Initiate home exercise program Met, 10/13/2023  2) Pt will increase AROM of Left elbow, wrist and hand by 15 degrees in order to assist with activities of daily living's by 4 weeks. Met, 10/13/2023  3) Pt will reduce edema by 1 cm in affected upper extremity by 4 weeks. Met, 10/13/2023  4) Pt will reduce pain to less than 4/10 by 4 weeks.Met, 10/13/2023  5) Pt will increase functional  strength by 5# in order to A in opening containers for med management or home management tasks by 4 weeks.met  6) Patient will be able to achieve less than or equal to 70% on the FOTO, demonstrating overall improved functional ability with upper extremity. (Self-care category) Met, 10/13/2023  Long Term Goals:  Goals to be met by discharge:  1) Independent with home exercise program Not met 10/13/2023, continue progressing   2) Pt will increase Left upper extremity range of motion to within functional limits grossly in order to increase functional use for grasp with home management or work related tasks by d/c. Not met 10/13/2023, continue progressing   3) Pt will decrease edema to trace or none to increase functional ROM by d/c. Not met 10/13/2023, continue progressing   4) Pt will decrease pain to trace or none while completing light home management tasks or work related tasks by d/c.Not met 10/13/2023, continue progressing   5) Patient will be able to achieve less than or equal to 25% on the FOTO, demonstrating overall improved functional ability with upper extremity.  (Self-care category) Not met 10/13/2023, continue  progressing      Plan   Continue per initial POC  Updates/Grading for next session: Progress as tolerated via protocol. Continue light progressive strengthening       Shavonne Monroe, OT

## 2023-10-13 ENCOUNTER — CLINICAL SUPPORT (OUTPATIENT)
Dept: REHABILITATION | Facility: HOSPITAL | Age: 68
End: 2023-10-13
Payer: COMMERCIAL

## 2023-10-13 DIAGNOSIS — M25.60 STIFFNESS DUE TO IMMOBILITY: ICD-10-CM

## 2023-10-13 DIAGNOSIS — M79.622 PAIN OF LEFT UPPER ARM: Primary | ICD-10-CM

## 2023-10-13 DIAGNOSIS — R53.1 WEAKNESS: ICD-10-CM

## 2023-10-13 DIAGNOSIS — Z74.09 STIFFNESS DUE TO IMMOBILITY: ICD-10-CM

## 2023-10-13 PROCEDURE — 97530 THERAPEUTIC ACTIVITIES: CPT | Mod: PN

## 2023-10-13 PROCEDURE — 97110 THERAPEUTIC EXERCISES: CPT | Mod: PN

## 2023-10-13 PROCEDURE — 97140 MANUAL THERAPY 1/> REGIONS: CPT | Mod: PN

## 2023-10-17 NOTE — PROGRESS NOTES
"  Occupational Therapy Daily Treatment Note     Name: Darrian Velez  Clinic Number: [id number]    Therapy Diagnosis:   Encounter Diagnoses   Name Primary?    Pain of left upper arm Yes    Weakness     Stiffness due to immobility      Physician: Julia Lanier PA-C    Visit Date: 10/18/2023  Physician Orders: Eval and Treat  Medical Diagnosis: S52.002A,S52.102A (ICD-10-CM) - Closed fracture of proximal end of left radius and ulna, initial encounter  Surgical Procedure and Date: 08/07/23: :   Open reduction internal fixation complex left olecranon/proximal ulna - 53269   Open treatment of left radial head fracture with excision and radial head replacement / Date of Injury: 7/30/2023  Evaluation Date: 9/20/2023  Insurance Authorization Period Expiration: 12/31/2023  Plan of Care Certification Period: 12/15/2023  Date of Return to MD: 10/23/2023  Visit # / Visits authorized: 8(+1) / 11  FOTO: 2/3     Precautions:  Standard     Time In: 7:00  Time Out: 7:45  Total Appointment Time (timed & untimed codes): 45 minutes    Subjective     Pt reports: "I have the numbness and tingling is back in this hand. My elbow isn't popping anymore though"  he was compliant with home exercise program given last session.   Response to previous treatment:low pain  Functional change: good endurance with exercises    Pain: 2/10  Location: left hand     Objective      Sensation Test: intact     Edema. Measured in centimeters.    9/20/2023 9/20/2023 9/27/2023 10/11/2023     Left Right Left Left   2in. Above elbow 35 32 33.5 33.5   2in. Below elbow 32 30 31 31   Wrist Crease 21 19 20 20   MCPs 24 21 23 22.5      Range of Motion/Strength:  Elbow and Wrist ROM. Measured in degrees.    9/20/2023 9/20/2023  9/27/203 10/11/2023      Left Right Left   Left   Elbow Extension/Flexion 30/110 0/140  20/120 10/125    Supination/Pronation 30/90 90/90 50 /90 55/90    Wrist Ext/Flex 40/20 74/70 50/50  60/50    Wrist RD/UD 10/14 20/30  20/15      " 20/30      Hand ROM. Measured in degrees.    9/20/2023 9/20/2023 9/27/2023  10/11/2023      Left Right  Left  Left               Index: MP  40 70  55  65               PIP     50 95  75 85               DIP 15 55  25 35                Long:  MP 45 75 60  65               PIP 50 100  70 85               DIP 25 75  25 50                Ring:   MP 45 75  55 75               PIP 55 90  75 90              DIP 20 75  20 40                Small:  MP 30 80  40 80                PIP 55 80  70 80               DIP 35 80  40 60                Thumb: MP 50 65 50  65                 IP 55 70  55 60       Rad ABD 45 45  45 45        Pal ABD 60 60  60  60          Opposition 1inch to tip of SF, able to touch tip of RF Base of SF  .5cm to tip of SF Dip of SF        Strength (Dynamometer Rung II) and Pinch Strength (Pinch Gauge)  Measured in pounds.    10/4/2023 10/4/2023 10/11/2023     Left Right Left   Elbow Flexed 20 75 25   Elbow Extended 15 65 20   Key Pinch 9 19 10   3pt Pinch 7 20 7   2pt Pinch 6 14 6       Treatment      Darrian received the following supervised modalities after being cleared for contradictions for 10 minutes:   - Fluidotherapy: To Left hand for 10 min, continuous air, 110 deg, air speed 100 to decrease pain, edema & scar tissue and increased tissue extensibility.     Darrian received the following manual therapy techniques for 15 minutes:   -Pt received retrograde massage as well as scar massage to decrease edema and stiffness for increased ROM. STM performed to decreased stiffness in surrounding musculature.     Darrian received therapeutic exercises for 20 minutes including:  -Therapist A PROM  -Elbow 3 ways 2/15 2#  -pronation/supination 2/15 with hammer   -Wrist 3 flexion/extension 2# 2/15  -pinky slides 10x  -finger spreads with blue band 10x  -pro/sup with hammer    Darrian participated in dynamic functional therapeutic activities to improve functional performance for 10 minutes, including:  -flexbar emphasis  on slow, resisted supination  -pink tputty    -   -intrinsic scrape   -jar turning   -bottle twist   -key turning  -pom pom  with thumb and pinky 1 container  -pom pom  pinky scoops 1 container   -Wrist dextraciser x 2 min     Home Exercises and Education Provided     Education provided:   - HEP in place   - Progress towards goals     Written Home Exercises Provided: Patient instructed to cont prior HEP.  Exercises were reviewed and Darrian was able to demonstrate them prior to the end of the session.  Darrian demonstrated good  understanding of the HEP provided.     See EMR under Patient Instructions for exercises provided prior visit.      Assessment   Pt with good participation this date. Pain free throughout today's session. Continues to report some numbness and tingling in left hand and reporting compliance with edema glove. Pt reports decreased clicking in elbow the past week. Pt was able to perform all exercises in a pain free range of motion and with good technique. Finger opposition within normal limits. Pt motivated.     Darrian is progressing well towards his goals and there are no updates to goals at this time. Pt prognosis is Good.     Pt will continue to benefit from skilled outpatient occupational therapy to address the deficits listed in the problem list on initial evaluation provide pt/family education and to maximize pt's level of independence in the home and community environment.     Anticipated barriers to occupational therapy: Severity of injury, hardware involved, stiffness and swelling,     Pt's spiritual, cultural and educational needs considered and pt agreeable to plan of care and goals.    Goals:  Short term Goals:  1) Initiate home exercise program Met, 10/18/2023  2) Pt will increase AROM of Left elbow, wrist and hand by 15 degrees in order to assist with activities of daily living's by 4 weeks. Met, 10/18/2023  3) Pt will reduce edema by 1 cm in affected upper extremity by 4  weeks. Met, 10/18/2023  4) Pt will reduce pain to less than 4/10 by 4 weeks.Met, 10/18/2023  5) Pt will increase functional  strength by 5# in order to A in opening containers for med management or home management tasks by 4 weeks.met  6) Patient will be able to achieve less than or equal to 70% on the FOTO, demonstrating overall improved functional ability with upper extremity. (Self-care category) Met, 10/18/2023  Long Term Goals:  Goals to be met by discharge:  1) Independent with home exercise program Not met 10/18/2023, continue progressing   2) Pt will increase Left upper extremity range of motion to within functional limits grossly in order to increase functional use for grasp with home management or work related tasks by d/c. Not met 10/18/2023, continue progressing   3) Pt will decrease edema to trace or none to increase functional ROM by d/c. Not met 10/18/2023, continue progressing   4) Pt will decrease pain to trace or none while completing light home management tasks or work related tasks by d/c.Not met 10/18/2023, continue progressing   5) Patient will be able to achieve less than or equal to 25% on the FOTO, demonstrating overall improved functional ability with upper extremity.  (Self-care category) Not met 10/18/2023, continue progressing      Plan   Continue per initial POC  Updates/Grading for next session: Progress as tolerated via protocol. Continue light progressive strengthening. Pt to see MD Monday, reassess next session for RTD Monday.       ACE Luu

## 2023-10-18 ENCOUNTER — CLINICAL SUPPORT (OUTPATIENT)
Dept: REHABILITATION | Facility: HOSPITAL | Age: 68
End: 2023-10-18
Payer: COMMERCIAL

## 2023-10-18 DIAGNOSIS — R53.1 WEAKNESS: ICD-10-CM

## 2023-10-18 DIAGNOSIS — Z74.09 STIFFNESS DUE TO IMMOBILITY: ICD-10-CM

## 2023-10-18 DIAGNOSIS — M79.622 PAIN OF LEFT UPPER ARM: Primary | ICD-10-CM

## 2023-10-18 DIAGNOSIS — M25.60 STIFFNESS DUE TO IMMOBILITY: ICD-10-CM

## 2023-10-18 PROCEDURE — 97110 THERAPEUTIC EXERCISES: CPT | Mod: PN

## 2023-10-18 PROCEDURE — 97530 THERAPEUTIC ACTIVITIES: CPT | Mod: PN

## 2023-10-18 PROCEDURE — 97022 WHIRLPOOL THERAPY: CPT | Mod: PN

## 2023-10-18 PROCEDURE — 97140 MANUAL THERAPY 1/> REGIONS: CPT | Mod: PN

## 2023-10-19 NOTE — PROGRESS NOTES
"  Occupational Therapy Daily Treatment Note     Name: Darrian Velez  Clinic Number: [id number]    Therapy Diagnosis:   Encounter Diagnoses   Name Primary?    Pain of left upper arm Yes    Weakness     Stiffness due to immobility      Physician: Julia Lanier PA-C    Visit Date: 10/20/2023  Physician Orders: Eval and Treat  Medical Diagnosis: S52.002A,S52.102A (ICD-10-CM) - Closed fracture of proximal end of left radius and ulna, initial encounter  Surgical Procedure and Date: 08/07/23: :   Open reduction internal fixation complex left olecranon/proximal ulna - 18809   Open treatment of left radial head fracture with excision and radial head replacement / Date of Injury: 7/30/2023  Evaluation Date: 9/20/2023  Insurance Authorization Period Expiration: 12/31/2023  Plan of Care Certification Period: 12/15/2023  Date of Return to MD: 10/23/2023  Visit # / Visits authorized: 10 / 11  FOTO: 2/3     Precautions:  Standard     Time In: 11:15 am  Time Out: 12:00 pm  Total Appointment Time (timed & untimed codes): 45 minutes    Subjective     Pt reports: "I'm really not in pain, its just the numbness."   he was compliant with home exercise program given last session.   Response to previous treatment:low pain  Functional change: good endurance with exercises    Pain: 1/10  Location: left hand     Objective      Sensation Test: intact     Edema. Measured in centimeters.    9/20/2023 9/20/2023 9/27/2023 10/11/2023     Left Right Left Left   2in. Above elbow 35 32 33.5 33.5   2in. Below elbow 32 30 31 31   Wrist Crease 21 19 20 20   MCPs 24 21 23 22.5      Range of Motion/Strength:  Elbow and Wrist ROM. Measured in degrees.    9/20/2023 9/20/2023  9/27/203 10/11/2023      Left Right Left   Left   Elbow Extension/Flexion 30/110 0/140  20/120 10/125    Supination/Pronation 30/90 90/90 50 /90 55/90    Wrist Ext/Flex 40/20 74/70 50/50  60/50    Wrist RD/UD 10/14 20/30  20/15      20/30      Hand ROM. Measured in " degrees.    9/20/2023 9/20/2023 9/27/2023  10/11/2023      Left Right  Left  Left               Index: MP  40 70  55  65               PIP     50 95  75 85               DIP 15 55  25 35                Long:  MP 45 75 60  65               PIP 50 100  70 85               DIP 25 75  25 50                Ring:   MP 45 75  55 75               PIP 55 90  75 90              DIP 20 75  20 40                Small:  MP 30 80  40 80                PIP 55 80  70 80               DIP 35 80  40 60                Thumb: MP 50 65 50  65                 IP 55 70  55 60       Rad ABD 45 45  45 45        Pal ABD 60 60  60  60          Opposition 1inch to tip of SF, able to touch tip of RF Base of SF  .5cm to tip of SF Dip of SF        Strength (Dynamometer Rung II) and Pinch Strength (Pinch Gauge)  Measured in pounds.    10/4/2023 10/4/2023 10/11/2023     Left Right Left   Elbow Flexed 20 75 25   Elbow Extended 15 65 20   Key Pinch 9 19 10   3pt Pinch 7 20 7   2pt Pinch 6 14 6       Treatment      Darrian received the following supervised modalities after being cleared for contradictions for 10 minutes:   -Patient tolerates MHP x 10 min in order to decrease pain and increase soft tissue extensibility in preparation for ROM, concurrent with assessment of deficits       Darrian received the following manual therapy techniques for 10 minutes:   -Pt received retrograde massage as well as scar massage to decrease edema and stiffness for increased ROM. STM performed to decreased stiffness in surrounding musculature.   -Following tx session, I provide Ktape to the dorsal hand with 50% stretch over the interossei to create a lift for lymphatic drainage to decrease edema.      Darrian received therapeutic exercises for 10 minutes including:  -Therapist A PROM  -assessment of deficits  -ROM in contrast bath  -ed on edema mgmt  -TGEs     Neuromuscular re-education activities to improve Balance, Coordination, Kinesthetic, Sense, Proprioception,  "Posture, and edema for 25 minutes. The following activities were included:  --contrast bath x 10 min to reduce upper limb pain, hypersensitivity, soft tissue inflammation and edema, and muscle spasm and joint stiffness.              -1 min warm water               -30 sec cold water              -alternate 6 times               -hand ROM during contrast baths   -stress loading program   -scrubbing x 2 min   -vibration x 2 min   -brushing x 2 min   -median nerve gliding           Home Exercises and Education Provided     Education provided:   - HEP in place   - Progress towards goals     Written Home Exercises Provided: Patient instructed to cont prior HEP.  Exercises were reviewed and Darrian was able to demonstrate them prior to the end of the session.  Darrian demonstrated good  understanding of the HEP provided.     See EMR under Patient Instructions for exercises provided prior visit.      Assessment   Upon arrival, patient has no c/o pain in the elbow or distal LUE. He demos good ROM and strength in the LUE, however limited in the hand 2/2 edema. His main complaint is the ongoing swelling of his L hand and numbness and tingling of the median nerve distribution. It is likely that the significant edema has led to compression of the median nerve at the carpal tunnel which is exacerbating these symptoms. He reports compliance with all HEP edema management strategies including ice, compression, RGM, elevation, and pumping exs. Tx session today focused on new edema management strategies as well as neuromuscular reeducation for sensory retraining and decreasing sensory disturbances. Today, I introduce contrast bath contrast bath to the RUE to reduce upper limb pain, hypersensitivity, soft tissue inflammation and edema, and muscle spasm and joint stiffness. This was tolerated well. I also introduce sensory stimulation/stress loading program to the median nerve distribution for sensory re-training. T/o this, he notes that "it " "doesn't feel much of anything but numbness." Patient returns to MD next week to discuss current statis and POC -specifically to discuss additional strategies for edema management. Following tx session, I provide Ktape to the dorsal hand with 50% stretch over the interossei to create a lift for lymphatic drainage to decrease edema. Patient would benefit from ongoing skilled OT services. Continue skilled OT POC and progress per protocol as tolerated.     Darrian is progressing well towards his goals and there are no updates to goals at this time. Pt prognosis is Good.     Pt will continue to benefit from skilled outpatient occupational therapy to address the deficits listed in the problem list on initial evaluation provide pt/family education and to maximize pt's level of independence in the home and community environment.     Anticipated barriers to occupational therapy: Severity of injury, hardware involved, stiffness and swelling,     Pt's spiritual, cultural and educational needs considered and pt agreeable to plan of care and goals.    Goals:  Short term Goals:  1) Initiate home exercise program Met, 10/20/2023  2) Pt will increase AROM of Left elbow, wrist and hand by 15 degrees in order to assist with activities of daily living's by 4 weeks. Met, 10/20/2023  3) Pt will reduce edema by 1 cm in affected upper extremity by 4 weeks. Met, 10/20/2023  4) Pt will reduce pain to less than 4/10 by 4 weeks.Met, 10/20/2023  5) Pt will increase functional  strength by 5# in order to A in opening containers for med management or home management tasks by 4 weeks.met  6) Patient will be able to achieve less than or equal to 70% on the FOTO, demonstrating overall improved functional ability with upper extremity. (Self-care category) Met, 10/20/2023  Long Term Goals:  Goals to be met by discharge:  1) Independent with home exercise program Not met 10/20/2023, continue progressing   2) Pt will increase Left upper extremity " range of motion to within functional limits grossly in order to increase functional use for grasp with home management or work related tasks by d/c. Not met 10/20/2023, continue progressing   3) Pt will decrease edema to trace or none to increase functional ROM by d/c. Not met 10/20/2023, continue progressing   4) Pt will decrease pain to trace or none while completing light home management tasks or work related tasks by d/c.Not met 10/20/2023, continue progressing   5) Patient will be able to achieve less than or equal to 25% on the FOTO, demonstrating overall improved functional ability with upper extremity.  (Self-care category) Not met 10/20/2023, continue progressing      Plan   Continue per initial POC  Updates/Grading for next session: Progress as tolerated via protocol. Continue light progressive strengthening. Pt to see MD Monday, reassess next session for RTD Monday.       Shavonne Monroe, OT

## 2023-10-20 ENCOUNTER — CLINICAL SUPPORT (OUTPATIENT)
Dept: REHABILITATION | Facility: HOSPITAL | Age: 68
End: 2023-10-20
Payer: COMMERCIAL

## 2023-10-20 DIAGNOSIS — Z74.09 STIFFNESS DUE TO IMMOBILITY: ICD-10-CM

## 2023-10-20 DIAGNOSIS — R53.1 WEAKNESS: ICD-10-CM

## 2023-10-20 DIAGNOSIS — M25.60 STIFFNESS DUE TO IMMOBILITY: ICD-10-CM

## 2023-10-20 DIAGNOSIS — M79.622 PAIN OF LEFT UPPER ARM: Primary | ICD-10-CM

## 2023-10-20 PROCEDURE — 97110 THERAPEUTIC EXERCISES: CPT | Mod: PN

## 2023-10-20 PROCEDURE — 97140 MANUAL THERAPY 1/> REGIONS: CPT | Mod: PN

## 2023-10-20 PROCEDURE — 97112 NEUROMUSCULAR REEDUCATION: CPT | Mod: PN

## 2023-10-23 ENCOUNTER — OFFICE VISIT (OUTPATIENT)
Dept: ORTHOPEDICS | Facility: CLINIC | Age: 68
End: 2023-10-23
Payer: COMMERCIAL

## 2023-10-23 ENCOUNTER — HOSPITAL ENCOUNTER (OUTPATIENT)
Dept: RADIOLOGY | Facility: HOSPITAL | Age: 68
Discharge: HOME | End: 2023-10-23
Attending: PHYSICIAN ASSISTANT
Payer: COMMERCIAL

## 2023-10-23 VITALS — HEIGHT: 67 IN | WEIGHT: 248.88 LBS | BODY MASS INDEX: 39.06 KG/M2

## 2023-10-23 DIAGNOSIS — S52.002D CLOSED FRACTURE OF PROXIMAL END OF LEFT RADIUS AND ULNA WITH ROUTINE HEALING, SUBSEQUENT ENCOUNTER: Primary | ICD-10-CM

## 2023-10-23 DIAGNOSIS — S52.102D CLOSED FRACTURE OF PROXIMAL END OF LEFT RADIUS AND ULNA WITH ROUTINE HEALING, SUBSEQUENT ENCOUNTER: Primary | ICD-10-CM

## 2023-10-23 DIAGNOSIS — S52.102D CLOSED FRACTURE OF PROXIMAL END OF LEFT RADIUS AND ULNA WITH ROUTINE HEALING, SUBSEQUENT ENCOUNTER: ICD-10-CM

## 2023-10-23 DIAGNOSIS — S52.002D CLOSED FRACTURE OF PROXIMAL END OF LEFT RADIUS AND ULNA WITH ROUTINE HEALING, SUBSEQUENT ENCOUNTER: ICD-10-CM

## 2023-10-23 PROCEDURE — 99999 PR PBB SHADOW E&M-EST. PATIENT-LVL III: CPT | Mod: PBBFAC,,, | Performed by: PHYSICIAN ASSISTANT

## 2023-10-23 PROCEDURE — 99024 PR POST-OP FOLLOW-UP VISIT: ICD-10-PCS | Mod: S$GLB,,, | Performed by: PHYSICIAN ASSISTANT

## 2023-10-23 PROCEDURE — 73080 X-RAY EXAM OF ELBOW: CPT | Mod: TC,LT

## 2023-10-23 PROCEDURE — 73080 XR ELBOW COMPLETE 3 VIEW LEFT: ICD-10-PCS | Mod: 26,LT,, | Performed by: RADIOLOGY

## 2023-10-23 PROCEDURE — 73080 X-RAY EXAM OF ELBOW: CPT | Mod: 26,LT,, | Performed by: RADIOLOGY

## 2023-10-23 PROCEDURE — 99024 POSTOP FOLLOW-UP VISIT: CPT | Mod: S$GLB,,, | Performed by: PHYSICIAN ASSISTANT

## 2023-10-23 PROCEDURE — 99999 PR PBB SHADOW E&M-EST. PATIENT-LVL III: ICD-10-PCS | Mod: PBBFAC,,, | Performed by: PHYSICIAN ASSISTANT

## 2023-10-23 RX ORDER — MELOXICAM 15 MG/1
15 TABLET ORAL DAILY
Qty: 30 TABLET | Refills: 0 | Status: SHIPPED | OUTPATIENT
Start: 2023-10-23 | End: 2023-11-22

## 2023-10-23 NOTE — PROGRESS NOTES
Principal Orthopedic Problem: left proximal radius and ulnar fracture      Relevant Medical History: according to chart     PMHX:  hypertension, hyperlipidemia     Lives at home with wife  Occupation:    Prior to injury  Independent community ambulator, gait aids   Denies history of stroke, heart attack, cancer, blood clot, diabetes  Denies tobacco use  Denied chronic pain medication use prior to injury        HPI     Patient is a 68 year old male who presented to the ED on 07/30/23 with right forearm pain after fall from standing.   RADS: segmental proximal ulna fracture with questionable intra-articular extension along with a radial head fracture and possible avulsion fracture of the radial tuberosity  Patient has been treated in a splint.   08/07/23: :   Open reduction internal fixation complex left olecranon/proximal ulna - 73864   Open treatment of left radial head fracture with excision and radial head replacement     Mr. Velez is here today for a post-operative visit    Interval History:  he reports that he is doing ok. Main complaint is of swelling and decreased motion in his hand due to swelling. Decreased sensation to all fingers   he is at home. he is participating in PT/OT. Using compression sleeve, elevating icing.   Pain is controlled.  he is  taking pain medication.    he denies fever, chills, and sweats .     Physical exam:    Patient arrives to exam room: walked in . Patient is  accompanied     Incision is clean, dry and intact.    Healing well no signs of breakdown or infection. Moderate swelling of arm and hand  Range of motion elbow     RADS: All pertinent images were reviewed by myself:   Decreased postop soft tissue swelling posterior olecranon.  Evidence of anterior posterior elbow joint effusion persist status post ORIF therapy for ulnar fracture and radial head replacement surgery.  Position alignment stable satisfactory, no hardware failure,  juxta-articular remaining bone fragments superior anterior, lateral radial humeral joint region.     Assessment:  Post-op visit     Plan:  Current care, treatment plan, precautions, activity level/ modifications, limitations, rehabilitation exercises and proposed future treatment were discussed with the patient. We discussed the need to monitor for changes in symptoms and condition and report them to the physician.  Discussed importance of compliance with all appointments and follow up examinations.     WOUND CARE :  - You may use vitamin E (break the capsule open), aloe, scar cream (mederma) or hand lotion to rub the scar.  You must rub across the scar and in the line of the scar.  The goal is to make the scar not tender and make the scar not adhere (stick to) the tissues below the scar.  There may be some mild discomfort with this initially.  That is okay.         ACTIVITY:   -  range-of-motion exercises,  -range of motion as tolerated light      -PT/OT, [facility] , Patient is responsible to establish and continue care      PAIN MEDICATION:   - Multimodal pain control  - Pain medication: refill was not needed  - Pain medication refill policy provided to patient for review, yes.    - Patient was informed a multi-modal approach is used to treat their pain. With the goal to get off of narcotic pain medication and discontinue as soon as possible.   - ice and elevation to reduce pain and swelling     DVT PROPHYLAXIS:   - Ambulatory    OTHER:  Encouraged edema control and massage  Order placed for EMG    FOLLOW UP:   - Patient will follow up in the clinic in 6 weeks, sooner if any concerns.  - X-ray of his elbow is needed.  OOB        If there are any questions prior to scheduled follow up, the patient was instructed to contact the office

## 2023-10-24 NOTE — PROGRESS NOTES
"  Occupational Therapy Daily Treatment Note     Name: Darrian Urban  Clinic Number: [id number]    Therapy Diagnosis:   Encounter Diagnoses   Name Primary?    Pain of left upper arm Yes    Weakness     Stiffness due to immobility      Physician: Julia Lanier PA-C    Visit Date: 10/25/2023  Physician Orders: Eval and Treat  Medical Diagnosis: S52.002A,S52.102A (ICD-10-CM) - Closed fracture of proximal end of left radius and ulna, initial encounter  Surgical Procedure and Date: 08/07/23: :   Open reduction internal fixation complex left olecranon/proximal ulna - 94756   Open treatment of left radial head fracture with excision and radial head replacement / Date of Injury: 7/30/2023  Evaluation Date: 9/20/2023  Insurance Authorization Period Expiration: 12/31/2023  Plan of Care Certification Period: 12/15/2023  Date of Return to MD: none scheduled;  EMG on 11/7/2023  Visit # / Visits authorized: 10 / 11  FOTO: 3/3     Precautions:  Standard     Time In: 7:00 am  Time Out: 7:45 pm  Total Appointment Time (timed & untimed codes): 45 minutes    Subjective     Pt reports: "I saw the doctor, the elbow is doing great it's just this swelling in the hand that's the problem now  he was compliant with home exercise program given last session.   Response to previous treatment:moderate pain  Functional change: improved range of motion, strength, FOTO score and edema    Pain: 5/10  Location: left hand     Objective      Sensation Test: intact     Edema. Measured in centimeters.    9/20/2023 9/20/2023 9/27/2023 10/11/2023 10/25/2033     Left Right Left Left Left   2in. Above elbow 35 32 33.5 33.5 33   2in. Below elbow 32 30 31 31 30   Wrist Crease 21 19 20 20 19.3   MCPs 24 21 23 22.5 22      Range of Motion/Strength:  Elbow and Wrist ROM. Measured in degrees.    9/20/2023 9/20/2023  9/27/203 10/11/2023  10/25/2023     Left Right Left   Left Left   Elbow Extension/Flexion 30/110 0/140  20/120 10/125  5/135 " "  Supination/Pronation 30/90 90/90 50 /90 55/90  60/90   Wrist Ext/Flex 40/20 74/70 50/50  60/50  60/50   Wrist RD/UD 10/14 20/30  20/15      20/30       20/30      Hand ROM. Measured in degrees.    9/20/2023 9/20/2023 9/27/2023  10/11/2023  10/25/2023     Left Right  Left  Left Left                Index: MP  40 70  55  65  65              PIP     50 95  75 85  85              DIP 15 55  25 35  55                Long:  MP 45 75 60  65  65              PIP 50 100  70 85  90              DIP 25 75  25 50  60                Ring:   MP 45 75  55 75  75              PIP 55 90  75 90 90              DIP 20 75  20 40  40                Small:  MP 30 80  40 80  80               PIP 55 80  70 80 80               DIP 35 80  40 60  60                Thumb: MP 50 65 50  65  65                IP 55 70  55 60 60       Rad ABD 45 45  45 45  45       Pal ABD 60 60  60  60 60          Opposition 1inch to tip of SF, able to touch tip of RF Base of SF  .5cm to tip of SF Dip of SF  DIP of SF       Strength (Dynamometer Rung II) and Pinch Strength (Pinch Gauge)  Measured in pounds.    10/4/2023 10/4/2023 10/11/2023 10/25/2023     Left Right Left Left   Elbow Flexed 20 75 25 30   Elbow Extended 15 65 20 30   Cox Pinch 9 19 10 12   3pt Pinch 7 20 7 7   2pt Pinch 6 14 6 7       Treatment      Darrian received the following supervised modalities after being cleared for contradictions for 5 minutes:   -Patient tolerates MHP x 5 min in order to decrease pain and increase soft tissue extensibility in preparation for ROM, concurrent with assessment of deficits       Darrian received the following manual therapy techniques for 10 minutes:   -Pt received retrograde massage as well as scar massage to decrease edema and stiffness for increased ROM. STM performed to decreased stiffness in surrounding musculature.     Darrian received therapeutic exercises for 20 minutes including:  -prayer stretch 3/30"  -Therapist A PROM  -Elbow 3 ways 2/15 " 2#  -pronation/supination 2/15 with golf club  -Wrist 3 flexion/extension 2# 2/15  -pinky slides 10x  -finger spreads with blue band 10x    Darrian participated in dynamic functional therapeutic activities to improve functional performance for 10 minutes, including:  -green flexbar emphasis on slow, resisted supination  -green tputty    - 2 minutes  -green ball wrist flicks 1 minute  -pom pom  with brown hand gripper 2nd setting  -9 hole peg test 3 x     Home Exercises and Education Provided     Education provided:   - HEP in place   - Progress towards goals   - issued green putty    Written Home Exercises Provided: Patient instructed to cont prior HEP.  Exercises were reviewed and Darrian was able to demonstrate them prior to the end of the session.  Darrian demonstrated good  understanding of the HEP provided.     See EMR under Patient Instructions for exercises provided prior visit.      Assessment   Pt with good participation this date. Pt followed up with MD and reports no complaints or restrictions in elbow. His biggest complaint continues to be swelling and pain in left hand. Pt going for NCS on 11/7. Objective measures taken, pt with overall improved range of motion and strength in left upper extremity. Pt able to complete all exercises in a pain free range of motion. Pt with improved FOTO score indicating increased functional use left upper extremity with self care tasks.     Darrian is progressing well towards his goals and there are no updates to goals at this time. Pt prognosis is Good.     Pt will continue to benefit from skilled outpatient occupational therapy to address the deficits listed in the problem list on initial evaluation provide pt/family education and to maximize pt's level of independence in the home and community environment.     Anticipated barriers to occupational therapy: Severity of injury, hardware involved, stiffness and swelling,     Pt's spiritual, cultural and educational needs  considered and pt agreeable to plan of care and goals.    Goals:  Short term Goals:  1) Initiate home exercise program Met, 10/25/2023  2) Pt will increase AROM of Left elbow, wrist and hand by 15 degrees in order to assist with activities of daily living's by 4 weeks. Met, 10/25/2023  3) Pt will reduce edema by 1 cm in affected upper extremity by 4 weeks. Met, 10/25/2023  4) Pt will reduce pain to less than 4/10 by 4 weeks.Met, 10/25/2023  5) Pt will increase functional  strength by 5# in order to A in opening containers for med management or home management tasks by 4 weeks.met  6) Patient will be able to achieve less than or equal to 70% on the FOTO, demonstrating overall improved functional ability with upper extremity. (Self-care category) Met, 10/25/2023  Long Term Goals:  Goals to be met by discharge:  1) Independent with home exercise program Not met 10/25/2023, continue progressing   2) Pt will increase Left upper extremity range of motion to within functional limits grossly in order to increase functional use for grasp with home management or work related tasks by d/c. Not met 10/25/2023, continue progressing   3) Pt will decrease edema to trace or none to increase functional ROM by d/c. Not met 10/25/2023, continue progressing   4) Pt will decrease pain to trace or none while completing light home management tasks or work related tasks by d/c.Not met 10/25/2023, continue progressing   5) Patient will be able to achieve less than or equal to 25% on the FOTO, demonstrating overall improved functional ability with upper extremity.  (Self-care category) Not met 10/25/2023, continue progressing      Plan   Continue per initial POC  Updates/Grading for next session: Progress as tolerated via protocol. Continue light progressive strengthening. Awaiting NCS results and will progress as able      ACE Luu

## 2023-10-25 ENCOUNTER — CLINICAL SUPPORT (OUTPATIENT)
Dept: REHABILITATION | Facility: HOSPITAL | Age: 68
End: 2023-10-25
Payer: COMMERCIAL

## 2023-10-25 DIAGNOSIS — M25.60 STIFFNESS DUE TO IMMOBILITY: ICD-10-CM

## 2023-10-25 DIAGNOSIS — M79.622 PAIN OF LEFT UPPER ARM: Primary | ICD-10-CM

## 2023-10-25 DIAGNOSIS — Z74.09 STIFFNESS DUE TO IMMOBILITY: ICD-10-CM

## 2023-10-25 DIAGNOSIS — R53.1 WEAKNESS: ICD-10-CM

## 2023-10-25 PROCEDURE — 97140 MANUAL THERAPY 1/> REGIONS: CPT | Mod: PN

## 2023-10-25 PROCEDURE — 97110 THERAPEUTIC EXERCISES: CPT | Mod: PN

## 2023-10-25 PROCEDURE — 97530 THERAPEUTIC ACTIVITIES: CPT | Mod: PN

## 2023-10-26 NOTE — PROGRESS NOTES
"  Occupational Therapy Daily Treatment Note     Name: Darrian Urban  Clinic Number: [id number]    Therapy Diagnosis:   Encounter Diagnoses   Name Primary?    Pain of left upper arm Yes    Weakness     Stiffness due to immobility      Physician: Julia Lanier PA-C    Visit Date: 10/27/2023  Physician Orders: Eval and Treat  Medical Diagnosis: S52.002A,S52.102A (ICD-10-CM) - Closed fracture of proximal end of left radius and ulna, initial encounter  Surgical Procedure and Date: 08/07/23: :   Open reduction internal fixation complex left olecranon/proximal ulna - 67516   Open treatment of left radial head fracture with excision and radial head replacement / Date of Injury: 7/30/2023  Evaluation Date: 9/20/2023  Insurance Authorization Period Expiration: 12/31/2023  Plan of Care Certification Period: 12/15/2023  Date of Return to MD: none scheduled;  EMG on 11/7/2023  Visit # / Visits authorized: 12 / 11  FOTO: 3/3     Precautions:  Standard     Time In: 11:25 am   Time Out: 12:10 pm  Total Appointment Time (timed & untimed codes): 45 minutes    Subjective     Pt reports: "The earliest they could get me in for the nerve study was November 7, but I'm on an antiinflammatory now too."  he was compliant with home exercise program given last session.   Response to previous treatment:moderate pain  Functional change: improved range of motion, strength, FOTO score and edema    Pain: 0/10  Location: left hand     Objective      Sensation Test: intact     Edema. Measured in centimeters.    9/20/2023 9/20/2023 9/27/2023 10/11/2023 10/25/2033     Left Right Left Left Left   2in. Above elbow 35 32 33.5 33.5 33   2in. Below elbow 32 30 31 31 30   Wrist Crease 21 19 20 20 19.3   MCPs 24 21 23 22.5 22      Range of Motion/Strength:  Elbow and Wrist ROM. Measured in degrees.    9/20/2023 9/20/2023  9/27/203 10/11/2023  10/25/2023     Left Right Left   Left Left   Elbow Extension/Flexion 30/110 0/140  20/120 10/125  5/135 "   Supination/Pronation 30/90 90/90 50 /90 55/90  60/90   Wrist Ext/Flex 40/20 74/70 50/50  60/50  60/50   Wrist RD/UD 10/14 20/30  20/15      20/30       20/30      Hand ROM. Measured in degrees.    9/20/2023 9/20/2023 9/27/2023  10/11/2023  10/25/2023     Left Right  Left  Left Left                Index: MP  40 70  55  65  65              PIP     50 95  75 85  85              DIP 15 55  25 35  55                Long:  MP 45 75 60  65  65              PIP 50 100  70 85  90              DIP 25 75  25 50  60                Ring:   MP 45 75  55 75  75              PIP 55 90  75 90 90              DIP 20 75  20 40  40                Small:  MP 30 80  40 80  80               PIP 55 80  70 80 80               DIP 35 80  40 60  60                Thumb: MP 50 65 50  65  65                IP 55 70  55 60 60       Rad ABD 45 45  45 45  45       Pal ABD 60 60  60  60 60          Opposition 1inch to tip of SF, able to touch tip of RF Base of SF  .5cm to tip of SF Dip of SF  DIP of SF       Strength (Dynamometer Rung II) and Pinch Strength (Pinch Gauge)  Measured in pounds.    10/4/2023 10/4/2023 10/11/2023 10/25/2023     Left Right Left Left   Elbow Flexed 20 75 25 30   Elbow Extended 15 65 20 30   Cox Pinch 9 19 10 12   3pt Pinch 7 20 7 7   2pt Pinch 6 14 6 7       Treatment      Darrian received the following supervised modalities after being cleared for contradictions for 5 minutes:   -Patient tolerates MHP x 5 min in order to decrease pain and increase soft tissue extensibility in preparation for ROM, concurrent with assessment of deficits       Darrian received the following manual therapy techniques for 15 minutes:   -I provide gentle RGS/STM to L hand in order to increase soft tissue extensibility, decrease pain, and tenderness/hypersensitivity in the stated area, and promote scar tissue remodeling.      Darrian participated in dynamic functional therapeutic activities to improve functional performance for 15 minutes,  including:  -finger<>palm translation with poms  -finger<>palm translation with marbles   -yellow tweb    -WB   - and twist  -pink tputty   -bead search   -pinch and roll into balls   -key turn    Neuromuscular re-education activities to improve Balance, Sense, and Proprioception for 15 minutes. The following activities were included:  -contrast bath x 10 min to reduce upper limb pain, hypersensitivity, soft tissue inflammation and edema, and muscle spasm and joint stiffness.              -1 min warm water               -30 sec cold water              -alternate 6 times               -hand ROM during contrast baths   -median nerve glides   -vibration to thenar eminence    Home Exercises and Education Provided     Education provided:   - HEP in place   - Progress towards goals   - issued green putty    Written Home Exercises Provided: Patient instructed to cont prior HEP.  Exercises were reviewed and Darrian was able to demonstrate them prior to the end of the session.  Darrian demonstrated good  understanding of the HEP provided.     See EMR under Patient Instructions for exercises provided prior visit.      Assessment     Patient states that his main complaint is the swelling leading to the numbness in his hand. He reports that the numbness inhibits him from performing activities such as turning a key and activities involving fine motor prehension. Patient goes in for nerve conduction study on Nov 7.Ongoing focus today on edema reduction and introduction of in hand manipulation skills today. We start today's session with contrast bath to reduce upper limb pain, hypersensitivity, soft tissue inflammation and edema, and muscle spasm and joint stiffness. He does well with all FMC/finger dexterity activities introduced today. He does occasionally drop items with pincer grasp 2/2 numbness in the median nerve distribution. No pain noted today with any activities. Continue skilled OT POC and progress per protocol as  toleratedVictoria Colmenares is progressing well towards his goals and there are no updates to goals at this time. Pt prognosis is Good.     Pt will continue to benefit from skilled outpatient occupational therapy to address the deficits listed in the problem list on initial evaluation provide pt/family education and to maximize pt's level of independence in the home and community environment.     Anticipated barriers to occupational therapy: Severity of injury, hardware involved, stiffness and swelling,     Pt's spiritual, cultural and educational needs considered and pt agreeable to plan of care and goals.    Goals:  Short term Goals:  1) Initiate home exercise program Met, 10/27/2023  2) Pt will increase AROM of Left elbow, wrist and hand by 15 degrees in order to assist with activities of daily living's by 4 weeks. Met, 10/27/2023  3) Pt will reduce edema by 1 cm in affected upper extremity by 4 weeks. Met, 10/27/2023  4) Pt will reduce pain to less than 4/10 by 4 weeks.Met, 10/27/2023  5) Pt will increase functional  strength by 5# in order to A in opening containers for med management or home management tasks by 4 weeks.met  6) Patient will be able to achieve less than or equal to 70% on the FOTO, demonstrating overall improved functional ability with upper extremity. (Self-care category) Met, 10/27/2023    Long Term Goals:  Goals to be met by discharge:  1) Independent with home exercise program Not met 10/27/2023, continue progressing   2) Pt will increase Left upper extremity range of motion to within functional limits grossly in order to increase functional use for grasp with home management or work related tasks by d/c. Not met 10/27/2023, continue progressing   3) Pt will decrease edema to trace or none to increase functional ROM by d/c. Not met 10/27/2023, continue progressing   4) Pt will decrease pain to trace or none while completing light home management tasks or work related tasks by d/c.Not met  10/27/2023, continue progressing   5) Patient will be able to achieve less than or equal to 25% on the FOTO, demonstrating overall improved functional ability with upper extremity.  (Self-care category) Not met 10/27/2023, continue progressing      Plan   Continue per initial POC  Updates/Grading for next session: Progress as tolerated via protocol. Continue light progressive strengthening. Awaiting NCS results and will progress as able    Shavonne Monroe OT

## 2023-10-27 ENCOUNTER — CLINICAL SUPPORT (OUTPATIENT)
Dept: REHABILITATION | Facility: HOSPITAL | Age: 68
End: 2023-10-27
Payer: COMMERCIAL

## 2023-10-27 DIAGNOSIS — M79.622 PAIN OF LEFT UPPER ARM: Primary | ICD-10-CM

## 2023-10-27 DIAGNOSIS — Z74.09 STIFFNESS DUE TO IMMOBILITY: ICD-10-CM

## 2023-10-27 DIAGNOSIS — R53.1 WEAKNESS: ICD-10-CM

## 2023-10-27 DIAGNOSIS — M25.60 STIFFNESS DUE TO IMMOBILITY: ICD-10-CM

## 2023-10-27 PROCEDURE — 97112 NEUROMUSCULAR REEDUCATION: CPT | Mod: PN

## 2023-10-27 PROCEDURE — 97140 MANUAL THERAPY 1/> REGIONS: CPT | Mod: PN

## 2023-10-27 PROCEDURE — 97530 THERAPEUTIC ACTIVITIES: CPT | Mod: PN

## 2023-10-31 NOTE — PROGRESS NOTES
"  Occupational Therapy Daily Treatment Note     Name: Darrian Velez  Clinic Number: [id number]    Therapy Diagnosis:   Encounter Diagnoses   Name Primary?    Pain of left upper arm Yes    Weakness     Stiffness due to immobility      Physician: Julia Lanier PA-C    Visit Date: 11/1/2023  Physician Orders: Eval and Treat  Medical Diagnosis: S52.002A,S52.102A (ICD-10-CM) - Closed fracture of proximal end of left radius and ulna, initial encounter  Surgical Procedure and Date: 08/07/23: :   Open reduction internal fixation complex left olecranon/proximal ulna - 22411   Open treatment of left radial head fracture with excision and radial head replacement / Date of Injury: 7/30/2023  Evaluation Date: 9/20/2023  Insurance Authorization Period Expiration: 12/31/2023  Plan of Care Certification Period: 12/15/2023  Date of Return to MD: none scheduled;  EMG on 11/7/2023  Visit # / Visits authorized: 13 / 11  FOTO: 3/3     Precautions:  Standard     Time In: 7:00 am   Time Out: 7:45 pm  Total Appointment Time (timed & untimed codes): 45 minutes    Subjective     Pt reports: "the numbness and the swelling comes and goes."  he was compliant with home exercise program given last session.   Response to previous treatment:no pain, just numbness  Functional change: improved range of motion, strength, FOTO score and edema    Pain: 0/10  Location: left hand     Objective      Sensation Test: intact     Edema. Measured in centimeters.    9/20/2023 9/20/2023 9/27/2023 10/11/2023 10/25/2033 11/1/2023     Left Right Left Left Left Left   2in. Above elbow 35 32 33.5 33.5 33 32.5   2in. Below elbow 32 30 31 31 30 30   Wrist Crease 21 19 20 20 19.3 19.3   MCPs 24 21 23 22.5 22 22.5      Range of Motion/Strength:  Elbow and Wrist ROM. Measured in degrees.    9/20/2023 9/20/2023  9/27/203 10/11/2023  10/25/2023 11/1/2023     Left Right Left   Left Left Left   Elbow Extension/Flexion 30/110 0/140  20/120 10/125  5/135 -5/140 "   Supination/Pronation 30/90 90/90 50 /90 55/90  60/90 65/90   Wrist Ext/Flex 40/20 74/70 50/50  60/50  60/50 60/50   Wrist RD/UD 10/14 20/30  20/15      20/30       20/30       20/30      Hand ROM. Measured in degrees.    9/20/2023 9/20/2023 9/27/2023  10/11/2023  10/25/2023 11/1/2023     Left Right  Left  Left Left Left                 Index: MP  40 70  55  65  65 65              PIP     50 95  75 85  85 85              DIP 15 55  25 35  55 55                 Long:  MP 45 75 60  65  65 70              PIP 50 100  70 85  90 90              DIP 25 75  25 50  60 60                 Ring:   MP 45 75  55 75  75 75              PIP 55 90  75 90 90 90              DIP 20 75  20 40  40 50                 Small:  MP 30 80  40 80  80 80               PIP 55 80  70 80 80 90               DIP 35 80  40 60  60 65                 Thumb: MP 50 65 50  65  65 65                IP 55 70  55 60 60 65       Rad ABD 45 45  45 45  45 45       Pal ABD 60 60  60  60 60 60          Opposition 1inch to tip of SF, able to touch tip of RF Base of SF  .5cm to tip of SF Dip of SF  DIP of SF Dip of SF       Strength (Dynamometer Rung II) and Pinch Strength (Pinch Gauge)  Measured in pounds.    10/4/2023 10/4/2023 10/11/2023 10/25/2023 11/1/2023     Left Right Left Left Left   Elbow Flexed 20 75 25 30 30   Elbow Extended 15 65 20 30 25   Cox Pinch 9 19 10 12 11   3pt Pinch 7 20 7 7 9   2pt Pinch 6 14 6 7 8       Treatment      Darrian received the following supervised modalities after being cleared for contradictions for 10 minutes:   -Fluidotherapy: To Left hand for 10 min, continuous air, 110 deg, air speed 100 to decrease pain, edema & scar tissue and increased tissue extensibility.    Darrian received the following manual therapy techniques for 10 minutes:   -I provide gentle RGS/STM to L hand in order to increase soft tissue extensibility, decrease pain, and tenderness/hypersensitivity in the stated area, and promote scar tissue remodeling.       Darrian participated in dynamic functional therapeutic activities to improve functional performance for 15 minutes, including:  -finger<>palm translation with poms  -finger<>palm translation with marbles   -yellow tweb    -WB   - and twist  -pink tputty   -bead search   -pinch and roll into balls   -key turn    Neuromuscular re-education activities to improve Balance, Sense, and Proprioception for 10 minutes. The following activities were included:  -contrast bath x 10 min to reduce upper limb pain, hypersensitivity, soft tissue inflammation and edema, and muscle spasm and joint stiffness.              -1 min warm water               -30 sec cold water              -alternate 6 times               -hand ROM during contrast baths   -median nerve glides     Home Exercises and Education Provided     Education provided:   - HEP in place   - Progress towards goals   - issued green putty    Written Home Exercises Provided: Patient instructed to cont prior HEP.  Exercises were reviewed and Darrian was able to demonstrate them prior to the end of the session.  Darrian demonstrated good  understanding of the HEP provided.     See EMR under Patient Instructions for exercises provided prior visit.      Assessment   Pt with good participation this date. He reports he feels like his range of motion, stiffness and pain in his elbow have resolved and all are now within normal limits grossly. His biggest complaint is left hand weakness and numbness. Pt to go for NCS next week. Overall range of motion, strength and edema have improved in left upper extremity however still presents with some residual deficits as compared to unaffected right. Pt Independent with home exercise program, use of edema glove, home contrast baths.     Darrian is progressing well towards his goals and there are no updates to goals at this time. Pt prognosis is Good.     Pt will continue to benefit from skilled outpatient occupational therapy to address the  deficits listed in the problem list on initial evaluation provide pt/family education and to maximize pt's level of independence in the home and community environment.     Anticipated barriers to occupational therapy: Severity of injury, hardware involved, stiffness and swelling,     Pt's spiritual, cultural and educational needs considered and pt agreeable to plan of care and goals.    Goals:  Short term Goals:  1) Initiate home exercise program Met, 11/1/2023  2) Pt will increase AROM of Left elbow, wrist and hand by 15 degrees in order to assist with activities of daily living's by 4 weeks. Met, 11/1/2023  3) Pt will reduce edema by 1 cm in affected upper extremity by 4 weeks. Met, 11/1/2023  4) Pt will reduce pain to less than 4/10 by 4 weeks.Met, 11/1/2023  5) Pt will increase functional  strength by 5# in order to A in opening containers for med management or home management tasks by 4 weeks.met  6) Patient will be able to achieve less than or equal to 70% on the FOTO, demonstrating overall improved functional ability with upper extremity. (Self-care category) Met, 11/1/2023    Long Term Goals:  Goals to be met by discharge:  1) Independent with home exercise program Not met 11/1/2023, continue progressing   2) Pt will increase Left upper extremity range of motion to within functional limits grossly in order to increase functional use for grasp with home management or work related tasks by d/c. Not met 11/1/2023, continue progressing   3) Pt will decrease edema to trace or none to increase functional ROM by d/c. Not met 11/1/2023, continue progressing   4) Pt will decrease pain to trace or none while completing light home management tasks or work related tasks by d/c.Not met 11/1/2023, continue progressing   5) Patient will be able to achieve less than or equal to 25% on the FOTO, demonstrating overall improved functional ability with upper extremity.  (Self-care category) Not met 11/1/2023, continue  progressing      Plan   Continue per initial POC  Updates/Grading for next session: Pt on hold at this time. Awaiting NCS results and will progress as able    ACE Luu

## 2023-11-01 ENCOUNTER — CLINICAL SUPPORT (OUTPATIENT)
Dept: REHABILITATION | Facility: HOSPITAL | Age: 68
End: 2023-11-01
Payer: COMMERCIAL

## 2023-11-01 DIAGNOSIS — M79.622 PAIN OF LEFT UPPER ARM: Primary | ICD-10-CM

## 2023-11-01 DIAGNOSIS — R53.1 WEAKNESS: ICD-10-CM

## 2023-11-01 DIAGNOSIS — Z74.09 STIFFNESS DUE TO IMMOBILITY: ICD-10-CM

## 2023-11-01 DIAGNOSIS — M25.60 STIFFNESS DUE TO IMMOBILITY: ICD-10-CM

## 2023-11-01 PROCEDURE — 97022 WHIRLPOOL THERAPY: CPT | Mod: PN

## 2023-11-01 PROCEDURE — 97140 MANUAL THERAPY 1/> REGIONS: CPT | Mod: PN

## 2023-11-01 PROCEDURE — 97530 THERAPEUTIC ACTIVITIES: CPT | Mod: PN

## 2023-11-01 PROCEDURE — 97112 NEUROMUSCULAR REEDUCATION: CPT | Mod: PN

## 2023-11-07 ENCOUNTER — PROCEDURE VISIT (OUTPATIENT)
Dept: NEUROLOGY | Facility: CLINIC | Age: 68
End: 2023-11-07
Payer: COMMERCIAL

## 2023-11-07 DIAGNOSIS — S52.102D CLOSED FRACTURE OF PROXIMAL END OF LEFT RADIUS AND ULNA WITH ROUTINE HEALING, SUBSEQUENT ENCOUNTER: ICD-10-CM

## 2023-11-07 DIAGNOSIS — S52.002D CLOSED FRACTURE OF PROXIMAL END OF LEFT RADIUS AND ULNA WITH ROUTINE HEALING, SUBSEQUENT ENCOUNTER: ICD-10-CM

## 2023-11-07 PROCEDURE — 95886 MUSC TEST DONE W/N TEST COMP: CPT | Mod: S$GLB,,, | Performed by: PSYCHIATRY & NEUROLOGY

## 2023-11-07 PROCEDURE — 95913 NRV CNDJ TEST 13/> STUDIES: CPT | Mod: S$GLB,,, | Performed by: PSYCHIATRY & NEUROLOGY

## 2023-11-07 PROCEDURE — 95886 PR EMG COMPLETE, W/ NERVE CONDUCTION STUDIES, 5+ MUSCLES: ICD-10-PCS | Mod: S$GLB,,, | Performed by: PSYCHIATRY & NEUROLOGY

## 2023-11-07 PROCEDURE — 95913 PR NERVE CONDUCTION STUDY; 13 OR MORE STUDIES: ICD-10-PCS | Mod: S$GLB,,, | Performed by: PSYCHIATRY & NEUROLOGY

## 2023-11-09 ENCOUNTER — PATIENT MESSAGE (OUTPATIENT)
Dept: ORTHOPEDICS | Facility: CLINIC | Age: 68
End: 2023-11-09
Payer: COMMERCIAL

## 2023-11-27 ENCOUNTER — LAB VISIT (OUTPATIENT)
Dept: LAB | Facility: HOSPITAL | Age: 68
End: 2023-11-27
Attending: FAMILY MEDICINE
Payer: COMMERCIAL

## 2023-11-27 DIAGNOSIS — R73.03 PREDIABETES: ICD-10-CM

## 2023-11-27 DIAGNOSIS — E66.01 SEVERE OBESITY (BMI 35.0-39.9) WITH COMORBIDITY: ICD-10-CM

## 2023-11-27 DIAGNOSIS — Z12.5 SCREENING FOR PROSTATE CANCER: ICD-10-CM

## 2023-11-27 DIAGNOSIS — I10 PRIMARY HYPERTENSION: ICD-10-CM

## 2023-11-27 DIAGNOSIS — E78.1 HYPERTRIGLYCERIDEMIA: ICD-10-CM

## 2023-11-27 LAB
ALBUMIN SERPL BCP-MCNC: 4.3 G/DL (ref 3.5–5.2)
ALP SERPL-CCNC: 88 U/L (ref 55–135)
ALT SERPL W/O P-5'-P-CCNC: 35 U/L (ref 10–44)
ANION GAP SERPL CALC-SCNC: 7 MMOL/L (ref 8–16)
AST SERPL-CCNC: 33 U/L (ref 10–40)
BASOPHILS # BLD AUTO: 0.08 K/UL (ref 0–0.2)
BASOPHILS NFR BLD: 1.3 % (ref 0–1.9)
BILIRUB SERPL-MCNC: 0.4 MG/DL (ref 0.1–1)
BUN SERPL-MCNC: 10 MG/DL (ref 8–23)
CALCIUM SERPL-MCNC: 9.4 MG/DL (ref 8.7–10.5)
CHLORIDE SERPL-SCNC: 105 MMOL/L (ref 95–110)
CHOLEST SERPL-MCNC: 161 MG/DL (ref 120–199)
CHOLEST/HDLC SERPL: 4 {RATIO} (ref 2–5)
CO2 SERPL-SCNC: 28 MMOL/L (ref 23–29)
COMPLEXED PSA SERPL-MCNC: 0.21 NG/ML (ref 0–4)
CREAT SERPL-MCNC: 1 MG/DL (ref 0.5–1.4)
DIFFERENTIAL METHOD: NORMAL
EOSINOPHIL # BLD AUTO: 0.2 K/UL (ref 0–0.5)
EOSINOPHIL NFR BLD: 2.6 % (ref 0–8)
ERYTHROCYTE [DISTWIDTH] IN BLOOD BY AUTOMATED COUNT: 12.9 % (ref 11.5–14.5)
EST. GFR  (NO RACE VARIABLE): >60 ML/MIN/1.73 M^2
ESTIMATED AVG GLUCOSE: 117 MG/DL (ref 68–131)
GLUCOSE SERPL-MCNC: 121 MG/DL (ref 70–110)
HBA1C MFR BLD: 5.7 % (ref 4–5.6)
HCT VFR BLD AUTO: 50.1 % (ref 40–54)
HDLC SERPL-MCNC: 40 MG/DL (ref 40–75)
HDLC SERPL: 24.8 % (ref 20–50)
HGB BLD-MCNC: 16.8 G/DL (ref 14–18)
IMM GRANULOCYTES # BLD AUTO: 0.02 K/UL (ref 0–0.04)
IMM GRANULOCYTES NFR BLD AUTO: 0.3 % (ref 0–0.5)
LDLC SERPL CALC-MCNC: 78.2 MG/DL (ref 63–159)
LYMPHOCYTES # BLD AUTO: 2.1 K/UL (ref 1–4.8)
LYMPHOCYTES NFR BLD: 35.1 % (ref 18–48)
MCH RBC QN AUTO: 30.4 PG (ref 27–31)
MCHC RBC AUTO-ENTMCNC: 33.5 G/DL (ref 32–36)
MCV RBC AUTO: 91 FL (ref 82–98)
MONOCYTES # BLD AUTO: 0.8 K/UL (ref 0.3–1)
MONOCYTES NFR BLD: 13.7 % (ref 4–15)
NEUTROPHILS # BLD AUTO: 2.9 K/UL (ref 1.8–7.7)
NEUTROPHILS NFR BLD: 47 % (ref 38–73)
NONHDLC SERPL-MCNC: 121 MG/DL
NRBC BLD-RTO: 0 /100 WBC
PLATELET # BLD AUTO: 243 K/UL (ref 150–450)
PMV BLD AUTO: 12.1 FL (ref 9.2–12.9)
POTASSIUM SERPL-SCNC: 4 MMOL/L (ref 3.5–5.1)
PROT SERPL-MCNC: 7.5 G/DL (ref 6–8.4)
RBC # BLD AUTO: 5.53 M/UL (ref 4.6–6.2)
SODIUM SERPL-SCNC: 140 MMOL/L (ref 136–145)
TRIGL SERPL-MCNC: 214 MG/DL (ref 30–150)
WBC # BLD AUTO: 6.07 K/UL (ref 3.9–12.7)

## 2023-11-27 PROCEDURE — 80061 LIPID PANEL: CPT | Performed by: FAMILY MEDICINE

## 2023-11-27 PROCEDURE — 83036 HEMOGLOBIN GLYCOSYLATED A1C: CPT | Performed by: FAMILY MEDICINE

## 2023-11-27 PROCEDURE — 36415 COLL VENOUS BLD VENIPUNCTURE: CPT | Mod: PO | Performed by: FAMILY MEDICINE

## 2023-11-27 PROCEDURE — 85025 COMPLETE CBC W/AUTO DIFF WBC: CPT | Performed by: FAMILY MEDICINE

## 2023-11-27 PROCEDURE — 80053 COMPREHEN METABOLIC PANEL: CPT | Performed by: FAMILY MEDICINE

## 2023-11-27 PROCEDURE — 84153 ASSAY OF PSA TOTAL: CPT | Performed by: FAMILY MEDICINE

## 2023-12-07 ENCOUNTER — OFFICE VISIT (OUTPATIENT)
Dept: FAMILY MEDICINE | Facility: CLINIC | Age: 68
End: 2023-12-07
Payer: COMMERCIAL

## 2023-12-07 VITALS
HEIGHT: 67 IN | OXYGEN SATURATION: 96 % | BODY MASS INDEX: 40.17 KG/M2 | DIASTOLIC BLOOD PRESSURE: 74 MMHG | SYSTOLIC BLOOD PRESSURE: 138 MMHG | WEIGHT: 255.94 LBS | HEART RATE: 108 BPM

## 2023-12-07 DIAGNOSIS — E87.6 HYPOKALEMIA: ICD-10-CM

## 2023-12-07 DIAGNOSIS — B35.4 TINEA CORPORIS: ICD-10-CM

## 2023-12-07 DIAGNOSIS — I10 ESSENTIAL HYPERTENSION: ICD-10-CM

## 2023-12-07 DIAGNOSIS — E66.01 SEVERE OBESITY (BMI >= 40): ICD-10-CM

## 2023-12-07 DIAGNOSIS — I10 PRIMARY HYPERTENSION: Primary | ICD-10-CM

## 2023-12-07 DIAGNOSIS — E78.5 DYSLIPIDEMIA: ICD-10-CM

## 2023-12-07 PROCEDURE — 99999 PR PBB SHADOW E&M-EST. PATIENT-LVL III: CPT | Mod: PBBFAC,,, | Performed by: FAMILY MEDICINE

## 2023-12-07 PROCEDURE — 99999 PR PBB SHADOW E&M-EST. PATIENT-LVL III: ICD-10-PCS | Mod: PBBFAC,,, | Performed by: FAMILY MEDICINE

## 2023-12-07 PROCEDURE — 99215 OFFICE O/P EST HI 40 MIN: CPT | Mod: S$GLB,,, | Performed by: FAMILY MEDICINE

## 2023-12-07 PROCEDURE — 99215 PR OFFICE/OUTPT VISIT, EST, LEVL V, 40-54 MIN: ICD-10-PCS | Mod: S$GLB,,, | Performed by: FAMILY MEDICINE

## 2023-12-07 RX ORDER — FENOFIBRATE 134 MG/1
134 CAPSULE ORAL
Qty: 90 CAPSULE | Refills: 3 | Status: SHIPPED | OUTPATIENT
Start: 2023-12-07

## 2023-12-07 RX ORDER — POTASSIUM CHLORIDE 20 MEQ/1
20 TABLET, EXTENDED RELEASE ORAL DAILY
Qty: 90 TABLET | Refills: 3 | Status: SHIPPED | OUTPATIENT
Start: 2023-12-07

## 2023-12-07 RX ORDER — ATORVASTATIN CALCIUM 10 MG/1
10 TABLET, FILM COATED ORAL NIGHTLY
Qty: 90 TABLET | Refills: 3 | Status: SHIPPED | OUTPATIENT
Start: 2023-12-07

## 2023-12-07 RX ORDER — NYSTATIN AND TRIAMCINOLONE ACETONIDE 100000; 1 [USP'U]/G; MG/G
CREAM TOPICAL 4 TIMES DAILY
Qty: 60 G | Refills: 0 | Status: SHIPPED | OUTPATIENT
Start: 2023-12-07 | End: 2023-12-17

## 2023-12-07 RX ORDER — LOSARTAN POTASSIUM AND HYDROCHLOROTHIAZIDE 12.5; 1 MG/1; MG/1
1 TABLET ORAL DAILY
Qty: 90 TABLET | Refills: 3 | Status: SHIPPED | OUTPATIENT
Start: 2023-12-07

## 2023-12-07 NOTE — TELEPHONE ENCOUNTER
No care due was identified.  [facility] Embedded Care Due Messages. Reference number: [id number].   12/07/2023 2:23:35 PM CST

## 2023-12-07 NOTE — PROGRESS NOTES
Subjective     Patient ID: Darrian Velez is a 68 y.o. male.    Chief Complaint: Hypertension and Hand Pain    68 years old male came to the clinic for blood pressure check.  Blood pressure today was stable.  No chest pain, palpitation, orthopnea or PND.  Patient with rash over the right arm.  Patient with a BMI of 40 currently trying to lose weight.    Hypertension  Pertinent negatives include no chest pain or palpitations.   Hand Pain   Pertinent negatives include no chest pain.     Review of Systems   Constitutional: Negative.    HENT: Negative.     Eyes: Negative.    Respiratory: Negative.     Cardiovascular: Negative.  Negative for chest pain, palpitations, leg swelling and claudication.   Gastrointestinal: Negative.    Genitourinary: Negative.    Musculoskeletal: Negative.    Integumentary:  Positive for rash.   Neurological: Negative.    Psychiatric/Behavioral: Negative.            Objective     Physical Exam  Vitals and nursing note reviewed.   Constitutional:       General: He is not in acute distress.     Appearance: He is well-developed. He is not diaphoretic.   HENT:      Head: Normocephalic and atraumatic.      Right Ear: External ear normal.      Left Ear: External ear normal.      Nose: Nose normal.      Mouth/Throat:      Pharynx: No oropharyngeal exudate.   Eyes:      General: No scleral icterus.        Right eye: No discharge.         Left eye: No discharge.      Conjunctiva/sclera: Conjunctivae normal.      Pupils: Pupils are equal, round, and reactive to light.   Neck:      Thyroid: No thyromegaly.      Vascular: No JVD.      Trachea: No tracheal deviation.   Cardiovascular:      Rate and Rhythm: Normal rate and regular rhythm.      Heart sounds: Normal heart sounds. No murmur heard.     No friction rub. No gallop.   Pulmonary:      Effort: Pulmonary effort is normal. No respiratory distress.      Breath sounds: Normal breath sounds. No stridor. No wheezing or rales.   Chest:      Chest wall:  No tenderness.   Abdominal:      General: Bowel sounds are normal. There is no distension.      Palpations: Abdomen is soft. There is no mass.      Tenderness: There is no abdominal tenderness. There is no guarding or rebound.   Musculoskeletal:         General: No tenderness. Normal range of motion.      Cervical back: Normal range of motion and neck supple.   Lymphadenopathy:      Cervical: No cervical adenopathy.   Skin:     General: Skin is warm and dry.      Coloration: Skin is not pale.      Findings: Rash (Right forearm.) present. No erythema.   Neurological:      Mental Status: He is alert and oriented to person, place, and time.      Cranial Nerves: No cranial nerve deficit.      Motor: No abnormal muscle tone.      Coordination: Coordination normal.      Deep Tendon Reflexes: Reflexes are normal and symmetric. Reflexes normal.   Psychiatric:         Behavior: Behavior normal.         Thought Content: Thought content normal.         Judgment: Judgment normal.            Assessment and Plan     1. Primary hypertension    2. Severe obesity (BMI >= 40)    3. Tinea corporis  -     nystatin-triamcinolone (MYCOLOG II) cream; Apply topically 4 (four) times daily. for 10 days  Dispense: 60 g; Refill: 0        Continue monitoring blood pressure at home, low sodium diet.          Follow up in about 6 months (around 6/7/2024), or if symptoms worsen or fail to improve.

## 2024-01-10 ENCOUNTER — DOCUMENTATION ONLY (OUTPATIENT)
Dept: ORTHOPEDICS | Facility: CLINIC | Age: 69
End: 2024-01-10
Payer: COMMERCIAL

## 2024-01-10 NOTE — PROGRESS NOTES
Received a request for medical and billing records from [facility].  Faxed request to the medical records department for processing.

## 2024-01-29 NOTE — PROGRESS NOTES
Patient is a 68 year old male who presented to the ED on 07/30/23 with right forearm pain after fall from standing.   RADS: segmental proximal ulna fracture with questionable intra-articular extension along with a radial head fracture and possible avulsion fracture of the radial tuberosity  Patient has been treated in a splint.   08/07/23: :   Open reduction internal fixation complex left olecranon/proximal ulna - 81734   Open treatment of left radial head fracture with excision and radial head replacement     Throughout his recover he had issues with swelling in his hand and fingers. He elbow has always done great. He has had numbness to his entire hand. We sent for an EMG that felt like this maybe related to his swelling. He has gone to PT and did compression. PT discharged him because every time he did exercises and wore the glove his symptoms got worse. Though he has had a great decrease in his swelling since that last time I saw him, he does have some continued swelling what does not decrease with elevation or compression. He also has continued numbness to all fingers. Will refer to the hand clinic for evaluation.

## 2024-02-01 ENCOUNTER — OFFICE VISIT (OUTPATIENT)
Dept: ORTHOPEDICS | Facility: CLINIC | Age: 69
End: 2024-02-01
Payer: MEDICARE

## 2024-02-01 ENCOUNTER — HOSPITAL ENCOUNTER (OUTPATIENT)
Dept: RADIOLOGY | Facility: HOSPITAL | Age: 69
Discharge: HOME | End: 2024-02-01
Attending: PHYSICIAN ASSISTANT
Payer: MEDICARE

## 2024-02-01 VITALS — BODY MASS INDEX: 40.17 KG/M2 | WEIGHT: 255.94 LBS | HEIGHT: 67 IN

## 2024-02-01 DIAGNOSIS — M79.642 LEFT HAND PAIN: ICD-10-CM

## 2024-02-01 DIAGNOSIS — S52.102D CLOSED FRACTURE OF PROXIMAL END OF LEFT RADIUS AND ULNA WITH ROUTINE HEALING, SUBSEQUENT ENCOUNTER: ICD-10-CM

## 2024-02-01 DIAGNOSIS — S52.002D CLOSED FRACTURE OF PROXIMAL END OF LEFT RADIUS AND ULNA WITH ROUTINE HEALING, SUBSEQUENT ENCOUNTER: ICD-10-CM

## 2024-02-01 DIAGNOSIS — S52.102D CLOSED FRACTURE OF PROXIMAL END OF LEFT RADIUS AND ULNA WITH ROUTINE HEALING, SUBSEQUENT ENCOUNTER: Primary | ICD-10-CM

## 2024-02-01 DIAGNOSIS — S52.002D CLOSED FRACTURE OF PROXIMAL END OF LEFT RADIUS AND ULNA WITH ROUTINE HEALING, SUBSEQUENT ENCOUNTER: Primary | ICD-10-CM

## 2024-02-01 PROCEDURE — 99499 UNLISTED E&M SERVICE: CPT | Mod: S$GLB,,, | Performed by: PHYSICIAN ASSISTANT

## 2024-02-01 PROCEDURE — 99999 PR PBB SHADOW E&M-EST. PATIENT-LVL III: CPT | Mod: PBBFAC,,, | Performed by: PHYSICIAN ASSISTANT

## 2024-02-21 ENCOUNTER — PATIENT MESSAGE (OUTPATIENT)
Dept: ORTHOPEDICS | Facility: CLINIC | Age: 69
End: 2024-02-21
Payer: MEDICARE

## 2024-02-21 DIAGNOSIS — M79.642 LEFT HAND PAIN: Primary | ICD-10-CM

## 2024-02-22 ENCOUNTER — OFFICE VISIT (OUTPATIENT)
Dept: ORTHOPEDICS | Facility: CLINIC | Age: 69
End: 2024-02-22
Payer: MEDICARE

## 2024-02-22 ENCOUNTER — HOSPITAL ENCOUNTER (OUTPATIENT)
Dept: RADIOLOGY | Facility: HOSPITAL | Age: 69
Discharge: HOME | End: 2024-02-22
Attending: ORTHOPAEDIC SURGERY
Payer: MEDICARE

## 2024-02-22 VITALS — WEIGHT: 255 LBS | BODY MASS INDEX: 40.02 KG/M2 | HEIGHT: 67 IN

## 2024-02-22 DIAGNOSIS — R20.0 NUMBNESS AND TINGLING IN LEFT HAND: Primary | ICD-10-CM

## 2024-02-22 DIAGNOSIS — M79.642 LEFT HAND PAIN: ICD-10-CM

## 2024-02-22 DIAGNOSIS — R20.2 NUMBNESS AND TINGLING IN LEFT HAND: Primary | ICD-10-CM

## 2024-02-22 PROCEDURE — 99999 PR PBB SHADOW E&M-EST. PATIENT-LVL II: CPT | Mod: PBBFAC,,, | Performed by: ORTHOPAEDIC SURGERY

## 2024-02-22 PROCEDURE — 1125F AMNT PAIN NOTED PAIN PRSNT: CPT | Mod: CPTII,S$GLB,, | Performed by: ORTHOPAEDIC SURGERY

## 2024-02-22 PROCEDURE — 99204 OFFICE O/P NEW MOD 45 MIN: CPT | Mod: S$GLB,,, | Performed by: ORTHOPAEDIC SURGERY

## 2024-02-22 PROCEDURE — 3008F BODY MASS INDEX DOCD: CPT | Mod: CPTII,S$GLB,, | Performed by: ORTHOPAEDIC SURGERY

## 2024-02-22 PROCEDURE — 73130 X-RAY EXAM OF HAND: CPT | Mod: TC,LT

## 2024-02-22 PROCEDURE — 1101F PT FALLS ASSESS-DOCD LE1/YR: CPT | Mod: CPTII,S$GLB,, | Performed by: ORTHOPAEDIC SURGERY

## 2024-02-22 PROCEDURE — 73130 X-RAY EXAM OF HAND: CPT | Mod: 26,LT,, | Performed by: STUDENT IN AN ORGANIZED HEALTH CARE EDUCATION/TRAINING PROGRAM

## 2024-02-22 PROCEDURE — 1159F MED LIST DOCD IN RCRD: CPT | Mod: CPTII,S$GLB,, | Performed by: ORTHOPAEDIC SURGERY

## 2024-02-22 PROCEDURE — 3288F FALL RISK ASSESSMENT DOCD: CPT | Mod: CPTII,S$GLB,, | Performed by: ORTHOPAEDIC SURGERY

## 2024-02-22 RX ORDER — GABAPENTIN 100 MG/1
100 CAPSULE ORAL 3 TIMES DAILY
Qty: 90 CAPSULE | Refills: 11 | Status: SHIPPED | OUTPATIENT
Start: 2024-02-22 | End: 2024-06-11

## 2024-02-22 NOTE — PROGRESS NOTES
[facility]  History & Physical  Orthopedics    SUBJECTIVE:      COVID-19 attestation:  This patient was treated during the COVID-19 pandemic.  This was discussed with the patient, they are aware of our current policies and procedures, were given the option of delaying their visit and or switching to a virtual visit, delaying their surgery when applicable, and they elect to proceed.    Chief Complaint: Left hand    Referring Provider: Julia Lanier PA-C     History of Present Illness:  Patient is a 69 y.o. right hand dominant male who presents today with complaints of  left hand numbness and tingling.  The patient is now a proximally  7 months status post left olecranon ORIF with radial head arthroplasty.  He notes that since the surgery he has had constant dense numbness throughout the entire left hand.  His recovery from the elbow is doing quite well and he has good return of motion and function in the elbow as well as the hand but the numbness is his primary reason for presentation today.  He denies other complaints and presents for initial evaluation.     The patient is a/an  computer worker.    Onset of symptoms/DOI was  7 months ago.    Symptoms are aggravated by activity and movement.    Symptoms are alleviated by rest.    Symptoms consist of numbness/tingling.    The patient rates their pain as a 6/10.    Attempted treatment(s) and/or interventions include activity modifications, rest, physical and/or occupational therapy.     The patient denies any fevers, chills, N/V, D/C and presents for evaluation.       Past Medical History:   Diagnosis Date    ALLERGIC RHINITIS     Hx of colonic polyps 4/05/2012    Tubular adenoma    Hyperlipidemia     Hypertension     MRSA infection     left medial thigh abscess & cellulitis    Personal history of kidney stones      Past Surgical History:   Procedure Laterality Date    COLONOSCOPY N/A 7/22/2020    Procedure: COLONOSCOPY;  Surgeon: Donato HAMILTON  MD Diogenes;  Location: [facility] ENDO;  Service: Endoscopy;  Laterality: N/A;    HERNIA REPAIR      umbilical hernia repair with mesh    OPEN REDUCTION AND INTERNAL FIXATION (ORIF) OF FRACTURE OF OLECRANON PROCESS OF ULNA Left 2023    Procedure: ORIF, FRACTURE, OLECRANON,;  Surgeon: Santosh Zavala MD;  Location: [facility] OR [address] [place] [address];  Service: Orthopedics;  Laterality: Left;    REPLACEMENT OF HEAD OF RADIUS Left 2023    Procedure: REPLACEMENT, RADIUS, HEAD;  Surgeon: Santosh Zavala MD;  Location: [facility] OR [address] [place] [address];  Service: Orthopedics;  Laterality: Left;    VASECTOMY       Review of patient's allergies indicates:   Allergen Reactions    No known allergies      Social History     Social History Narrative    Not on file     Family History   Problem Relation Age of Onset    Coronary artery disease Father          age 59    Heart disease Father     Breast cancer Mother     Hypertension Mother     Colon cancer Maternal Grandmother     Testicular cancer Brother          Current Outpatient Medications:     atorvastatin (LIPITOR) 10 MG tablet, Take 1 tablet (10 mg total) by mouth every evening., Disp: 90 tablet, Rfl: 3    fenofibrate micronized (LOFIBRA) 134 MG Cap, Take 1 capsule (134 mg total) by mouth daily with breakfast., Disp: 90 capsule, Rfl: 3    losartan-hydrochlorothiazide 100-12.5 mg (HYZAAR) 100-12.5 mg Tab, Take 1 tablet by mouth once daily., Disp: 90 tablet, Rfl: 3    nystatin-triamcinolone (MYCOLOG II) cream, Apply topically 4 (four) times daily. for 10 days, Disp: 60 g, Rfl: 0    oxyCODONE-acetaminophen (PERCOCET) 7.5-325 mg per tablet, Take 1 tablet by mouth every 4 (four) hours as needed for Pain., Disp: 11 tablet, Rfl: 0    potassium chloride SA (K-DUR,KLOR-CON) 20 MEQ tablet, Take 1 tablet (20 mEq total) by mouth once daily., Disp: 90 tablet, Rfl: 3      Review of Systems:  As per HPI otherwise noncontributory    OBJECTIVE:      Vital Signs (Most Recent):  Vitals:    24 1445  "  Weight: 115.7 kg (255 lb)   Height: 5' 7" (1.702 m)     Body mass index is 39.94 kg/m².      Physical Exam:  Constitutional: The patient appears well-developed and well-nourished. No distress.   Skin: No lesions appreciated  Head: Normocephalic and atraumatic.   Nose: Nose normal.   Ears: No deformities seen  Eyes: Conjunctivae and EOM are normal.   Neck: No tracheal deviation present.   Cardiovascular: Normal rate and intact distal pulses.    Pulmonary/Chest: Effort normal. No respiratory distress.   Abdominal: There is no guarding.   Neurological: The patient is alert.   Psychiatric: The patient has a normal mood and affect.     Left Hand/Wrist Examination:    Observation/Inspection:  Swelling  none    Deformity  none  Discoloration  none     Scars   none    Atrophy  None    Neurovascular Exam:  Digits WWP, brisk CR < 3s throughout  NVI motor/LTS to M/R/U nerves, radial pulse 2+ except for diminished light touch sensation throughout the entire left hand; motor  function is intact and strong throughout  Tinel's Test - Carpal Tunnel  Neg  Tinel's Test - Cubital Tunnel  Neg  Phalen's Test    Neg  Median Nerve Compression Test Neg    ROM hand full, painless    ROM wrist full, painless    ROM elbow  is nearly full, painless    Abdomen not guarded  Respirations nonlabored  Perfusion intact    Diagnostic Results:     Imaging - I independently viewed the patient's imaging as well as the radiology report.  Xrays of the patient's  left hand  demonstrate  mild arthritic change    EMG - Impression   This is an abnormal study. There is electrophysiologic evidence of:   1. Absent left dorsal ulnar cutaneous sensory responses, diminished sensory amplitudes in the left median and ulnar nerves, and borderline low sensory responses in the left radial nerve. The findings may represent some axonal losses in those nerves but more likely they are at least partly secondary to swelling in the hand and distal forearm, which introduces " technical interference into the procedure.   2. 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 The right median antidromic sensory and orthodromic motor nerve evaluations are within normal limits.     ASSESSMENT/PLAN:      69 y.o. yo male with  left hand global numbness  Plan: The patient and I had a thorough discussion today.  We discussed the working diagnosis as well as several other potential alternative diagnoses.  Treatment options were discussed, both conservative and surgical.  Conservative treatment options would include things such as activity modifications, workplace modifications, a period of rest, oral vs topical OTC and prescription anti-inflammatory medications, occupational therapy, splinting/bracing, immobilization, corticosteroid injections, and others.  Surgical options were discussed as well.     At this time, it is unclear as to the etiology of his left hand numbness.  Nothing focal on his recent EMG.  I would like to repeat an EMG study 6 months status post his prior 1 for comparison.  Follow-up after repeat EMG or sooner for any problems.  No restrictions.  We will send Neurontin to  His pharmacy today.    Should the patient's symptoms worsen, persist, or fail to improve they should return for reevaluation and I would be happy to see them back anytime.        Yannick Vance M.D.    Please be aware that this note has been generated with the assistance of [employer] voice-to-text.  Please excuse any spelling or grammatical errors.    Thank you for choosing Dr. Yannick Vance for your orthopedic hand and upper extremity care. It is our goal to provide you with exceptional care that will help keep you healthy, active, and get you back in the game.     If you felt that you received exemplary care today, please consider leaving feedback for Dr. Vance on [employer] at  [email or web address].    Please do not hesitate to reach out to us via email, phone, or MyChart with any questions, concerns, or feedback.

## 2024-03-21 ENCOUNTER — PATIENT MESSAGE (OUTPATIENT)
Dept: ORTHOPEDICS | Facility: CLINIC | Age: 69
End: 2024-03-21
Payer: MEDICARE

## 2024-05-06 ENCOUNTER — PROCEDURE VISIT (OUTPATIENT)
Dept: NEUROLOGY | Facility: CLINIC | Age: 69
End: 2024-05-06
Payer: MEDICARE

## 2024-05-06 DIAGNOSIS — R20.2 NUMBNESS AND TINGLING IN LEFT HAND: ICD-10-CM

## 2024-05-06 DIAGNOSIS — R20.0 NUMBNESS AND TINGLING IN LEFT HAND: ICD-10-CM

## 2024-05-06 PROCEDURE — 95912 NRV CNDJ TEST 11-12 STUDIES: CPT | Mod: S$GLB,,, | Performed by: PHYSICAL MEDICINE & REHABILITATION

## 2024-05-06 PROCEDURE — 95886 MUSC TEST DONE W/N TEST COMP: CPT | Mod: S$GLB,,, | Performed by: PHYSICAL MEDICINE & REHABILITATION

## 2024-05-06 NOTE — PROCEDURES
Test Date:  2024    Patient: darrian aguilar : 1955 Physician: Gaston Frederick D.O.   ID#: [id number] SEX: Male Ref. Phys: Yannick Vance MD     HPI: Darrian Aguilar is a 69 y.o.male who presents for NCS/EMG to evaluate for left hand numbness.  Numbness includes the entire left hand.  This began after elbow fracture and surgery 9 months ago.  He had an NCS/EMG done in 2023, which showed small sensory amplitudes, thought to be secondary to arm and hand swelling at the time.  Swelling has since improved.      PROCEDURE:  Prior to the procedure, the procedure was discussed in detail with the patient.  All questions were answered, and verbal consent was obtained.  For nerve conduction studies, a combination of surface electrodes, bar electrodes, and/or ring electrodes were used as needed.  For needle EMG, each site was cleaned and prepped in usual fashion with an alcohol pad.  A monopolar needle (28G) was used.  There was no significant bleeding, and bandages were applied as needed.  The procedure was tolerated without adverse effect.  The patient was instructed on post-procedure care including ice if needed for 10-15 minutes up to 4 times/day for any sore muscles.  I discussed with the patient that the data would be reviewed and a report sent to the referring provider, where any follow up questions regarding next steps should be directed.        NCV & EMG Findings:  All nerve conduction studies (as indicated in the following tables) were within normal limits.  All examined muscles (as indicated in the following table) showed no evidence of electrical instability.      IMPRESSIONS:  This is a normal electrophysiologic study of the left upper extremity.  There is no evidence of an entrapment neuropathy or brachial plexopathy affecting the left upper extremity.  It is notable that the sensory responses that were noted to be of small amplitude 5-6 months ago are normal today.               ___________________________  Gaston Frederick D.O.        NCS+  Motor Nerve Results      Latency Amplitude F-Lat Segment Distance CV Comment   Site (ms) Norm (mV) Norm (ms)  (cm) (m/s) Norm    Left Median (APB)   Wrist 4.2  < 4.7 6.1  > 3.8         Elbow 8.9 - 4.7 -  Elbow-Wrist 24 51  > 47    Right Median (APB)   Wrist 4.4  < 4.7 5.1  > 3.8         Elbow 9.0 - 5.1 -  Elbow-Wrist 23 50  > 47    Left Ulnar (ADM)   Wrist 2.8  < 3.7 7.4  > 3.0         Bel Elbow 7.0 - 6.2 -  Bel Elbow-Wrist 24 57  > 52    Abv Elbow 8.3 - 6.8 -  Abv Elbow-Bel Elbow 8 62  > 43    Right Ulnar (ADM)   Wrist 2.9  < 3.7 6.7  > 3.0         Bel Elbow 8.1 - 6.1 -  Bel Elbow-Wrist 27 52  > 52      Sensory Nerve Results      Start Lat Latency (Peak) Amplitude (P-P) Segment Distance Start CV Comment   Site (ms) Norm (ms) (µV) Norm  (cm) (m/s) Norm    Left Median (Rec:Dig II)   Wrist 2.9  < 3.3 3.9 22  > 8 Wrist-Dig II 14 48  > 42    Right Median (Rec:Dig II)   Wrist 3.2  < 3.3 3.9 18  > 8 Wrist-Dig II 14 44  > 42    Left Ulnar (Rec:Dig V)   Wrist 2.4  < 3.1 3.0 19  > 4 Wrist-Dig V 14 58  > 45    Right Ulnar (Rec:Dig V)   Wrist 2.5  < 3.1 3.0 14  > 4 Wrist-Dig V 14 56  > 45    Left Dorsal Ulnar Cutaneous (Rec:Dorsum 5th MC)   Wrist 1.48 - 2.7 10 - Wrist-Dorsum 5th MC - - -    Left Radial (Rec:Wrist)   Forearm 1.38  < 2.2 1.90 14  > 11 Forearm-Wrist 10 72 -    Right Radial (Rec:Wrist)   Forearm 1.25  < 2.2 1.90 15  > 11 Forearm-Wrist 10 80 -      EMG+     Side Muscle Nerve Root Ins Act Fibs Psw Amp Dur Poly Recrt Int Pat Comment   Left Deltoid Axillary C5-C6 Nml Nml Nml Nml Nml 0 Nml Nml    Left Biceps Musculocut C5-C6 Nml Nml Nml Nml Nml 0 Nml Nml    Left Triceps Radial C6-C8 Nml Nml Nml Nml Nml 0 Nml Nml    Left Pronator Teres Median C6-C7 Nml Nml Nml Nml Nml 0 Nml Nml    Left FDI Ulnar C8-T1 Nml Nml Nml Nml Nml 0 Nml Nml            Waveforms:    Motor              Sensory

## 2024-05-07 ENCOUNTER — OFFICE VISIT (OUTPATIENT)
Dept: ORTHOPEDICS | Facility: CLINIC | Age: 69
End: 2024-05-07
Payer: MEDICARE

## 2024-05-07 VITALS — HEIGHT: 67 IN | WEIGHT: 255.06 LBS | BODY MASS INDEX: 40.03 KG/M2

## 2024-05-07 DIAGNOSIS — R20.0 LEFT ARM NUMBNESS: Primary | ICD-10-CM

## 2024-05-07 PROCEDURE — 1125F AMNT PAIN NOTED PAIN PRSNT: CPT | Mod: CPTII,S$GLB,, | Performed by: ORTHOPAEDIC SURGERY

## 2024-05-07 PROCEDURE — 1159F MED LIST DOCD IN RCRD: CPT | Mod: CPTII,S$GLB,, | Performed by: ORTHOPAEDIC SURGERY

## 2024-05-07 PROCEDURE — 3288F FALL RISK ASSESSMENT DOCD: CPT | Mod: CPTII,S$GLB,, | Performed by: ORTHOPAEDIC SURGERY

## 2024-05-07 PROCEDURE — 99214 OFFICE O/P EST MOD 30 MIN: CPT | Mod: S$GLB,,, | Performed by: ORTHOPAEDIC SURGERY

## 2024-05-07 PROCEDURE — 3008F BODY MASS INDEX DOCD: CPT | Mod: CPTII,S$GLB,, | Performed by: ORTHOPAEDIC SURGERY

## 2024-05-07 PROCEDURE — 1101F PT FALLS ASSESS-DOCD LE1/YR: CPT | Mod: CPTII,S$GLB,, | Performed by: ORTHOPAEDIC SURGERY

## 2024-05-07 PROCEDURE — 99999 PR PBB SHADOW E&M-EST. PATIENT-LVL III: CPT | Mod: PBBFAC,,, | Performed by: ORTHOPAEDIC SURGERY

## 2024-05-07 NOTE — PROGRESS NOTES
[facility]  History & Physical  Orthopedics    SUBJECTIVE:      COVID-19 attestation:  This patient was treated during the COVID-19 pandemic.  This was discussed with the patient, they are aware of our current policies and procedures, were given the option of delaying their visit and or switching to a virtual visit, delaying their surgery when applicable, and they elect to proceed.    Chief Complaint: Left hand    Referring Provider: No ref. provider found     History of Present Illness:  Patient is a 69 y.o. right hand dominant male who presents today with complaints of  left hand numbness and tingling.  The patient is now a proximally  7 months status post left olecranon ORIF with radial head arthroplasty.  He notes that since the surgery he has had constant dense numbness throughout the entire left hand.  His recovery from the elbow is doing quite well and he has good return of motion and function in the elbow as well as the hand but the numbness is his primary reason for presentation today.  He denies other complaints and presents for initial evaluation.     Interval History: 5/7/24  Patient with continued left hand numbness but he states the swelling has gone down quite a bit and it has gotten somewhat better. He denies additional symptoms. He states he does feel the left arm and hand are considerably weaker than the right. He states the neurotin gives him a headache so he has only been taking it twice daily.  Plans to retire 6/28    The patient is a/an  computer worker.    Onset of symptoms/DOI was  9 months ago.    Symptoms are aggravated by activity and movement.    Symptoms are alleviated by rest.    Symptoms consist of numbness/tingling.    The patient rates their pain as a 6/10.    Attempted treatment(s) and/or interventions include activity modifications, rest, physical and/or occupational therapy.     The patient denies any fevers, chills, N/V, D/C and presents for evaluation.        Past Medical History:   Diagnosis Date    ALLERGIC RHINITIS     Hx of colonic polyps 2012    Tubular adenoma    Hyperlipidemia     Hypertension     MRSA infection     left medial thigh abscess & cellulitis    Personal history of kidney stones      Past Surgical History:   Procedure Laterality Date    COLONOSCOPY N/A 2020    Procedure: COLONOSCOPY;  Surgeon: Donato Shetty MD;  Location: [facility];  Service: Endoscopy;  Laterality: N/A;    HERNIA REPAIR      umbilical hernia repair with mesh    OPEN REDUCTION AND INTERNAL FIXATION (ORIF) OF FRACTURE OF OLECRANON PROCESS OF ULNA Left 2023    Procedure: ORIF, FRACTURE, OLECRANON,;  Surgeon: Santosh Zavala MD;  Location: [facility] OR [address] [place] [address];  Service: Orthopedics;  Laterality: Left;    REPLACEMENT OF HEAD OF RADIUS Left 2023    Procedure: REPLACEMENT, RADIUS, HEAD;  Surgeon: Santosh Zavala MD;  Location: [facility] OR [address] [place] [address];  Service: Orthopedics;  Laterality: Left;    VASECTOMY       Review of patient's allergies indicates:   Allergen Reactions    No known allergies      Social History     Social History Narrative    Not on file     Family History   Problem Relation Name Age of Onset    Coronary artery disease Father           age 59    Heart disease Father      Breast cancer Mother      Hypertension Mother      Colon cancer Maternal Grandmother      Testicular cancer Brother           Current Outpatient Medications:     atorvastatin (LIPITOR) 10 MG tablet, Take 1 tablet (10 mg total) by mouth every evening., Disp: 90 tablet, Rfl: 3    fenofibrate micronized (LOFIBRA) 134 MG Cap, Take 1 capsule (134 mg total) by mouth daily with breakfast., Disp: 90 capsule, Rfl: 3    gabapentin (NEURONTIN) 100 MG capsule, Take 1 capsule (100 mg total) by mouth 3 (three) times daily., Disp: 90 capsule, Rfl: 11    losartan-hydrochlorothiazide 100-12.5 mg (HYZAAR) 100-12.5 mg Tab, Take 1 tablet by mouth once daily., Disp: 90 tablet, Rfl: 3     "nystatin-triamcinolone (MYCOLOG II) cream, Apply topically 4 (four) times daily. for 10 days, Disp: 60 g, Rfl: 0    oxyCODONE-acetaminophen (PERCOCET) 7.5-325 mg per tablet, Take 1 tablet by mouth every 4 (four) hours as needed for Pain., Disp: 11 tablet, Rfl: 0    potassium chloride SA (K-DUR,KLOR-CON) 20 MEQ tablet, Take 1 tablet (20 mEq total) by mouth once daily., Disp: 90 tablet, Rfl: 3      Review of Systems:  As per HPI otherwise noncontributory    OBJECTIVE:      Vital Signs (Most Recent):  Vitals:    05/07/24 0803   Weight: 115.7 kg (255 lb 1.2 oz)   Height: 5' 7" (1.702 m)     Body mass index is 39.95 kg/m².      Physical Exam:  Constitutional: The patient appears well-developed and well-nourished. No distress.   Skin: No lesions appreciated  Head: Normocephalic and atraumatic.   Nose: Nose normal.   Ears: No deformities seen  Eyes: Conjunctivae and EOM are normal.   Neck: No tracheal deviation present.   Cardiovascular: Normal rate and intact distal pulses.    Pulmonary/Chest: Effort normal. No respiratory distress.   Abdominal: There is no guarding.   Neurological: The patient is alert.   Psychiatric: The patient has a normal mood and affect.     Left Hand/Wrist Examination:    Observation/Inspection:  Swelling  none    Deformity  none  Discoloration  none     Scars   none    Atrophy  None    Neurovascular Exam:  Digits WWP, brisk CR < 3s throughout  NVI motor/LTS to M/R/U nerves, radial pulse 2+ except for diminished light touch sensation throughout the entire left hand; motor  function is intact and strong throughout  Tinel's Test - Carpal Tunnel  Neg  Tinel's Test - Cubital Tunnel  Neg  Phalen's Test    Neg  Median Nerve Compression Test Neg    ROM hand full, painless    ROM wrist full, painless    ROM elbow  is nearly full, painless    Abdomen not guarded  Respirations nonlabored  Perfusion intact    Diagnostic Results:     Imaging - I independently viewed the patient's imaging as well as the " radiology report.  Xrays of the patient's  left hand  demonstrate  mild arthritic change    EMG - Impression 11/7  This is an abnormal study. There is electrophysiologic evidence of:   1. Absent left dorsal ulnar cutaneous sensory responses, diminished sensory amplitudes in the left median and ulnar nerves, and borderline low sensory responses in the left radial nerve. The findings may represent some axonal losses in those nerves but more likely they are at least partly secondary to swelling in the hand and distal forearm, which introduces technical interference into the procedure.   2. 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 The right median antidromic sensory and orthodromic motor nerve evaluations are within normal limits.     EMG 5/6  IMPRESSIONS:  This is a normal electrophysiologic study of the left upper extremity.  There is no evidence of an entrapment neuropathy or brachial plexopathy affecting the left upper extremity.  It is notable that the sensory responses that were noted to be of small amplitude 5-6 months ago are normal today     ASSESSMENT/PLAN:      69 y.o. yo male with  left hand global numbness  Plan: The patient and I had a thorough discussion today.  We discussed the working diagnosis as well as several other potential alternative diagnoses.  Treatment options were discussed, both conservative and surgical.  Conservative treatment options would include things such as activity modifications, workplace modifications, a period of rest, oral vs topical OTC and prescription anti-inflammatory medications, occupational therapy, splinting/bracing, immobilization, corticosteroid injections, and others.  Surgical options were discussed as well.     At this time, it is unclear as to the etiology of his left hand numbness.  He still has continued numbness in the left hand. The most recent EMG is actually  improved compared to 6 months ago. We will have him see Neurology for continued numbness of unknown etiology.    Should the patient's symptoms worsen, persist, or fail to improve they should return for reevaluation and I would be happy to see them back anytime.        Yannick Vance M.D.    Please be aware that this note has been generated with the assistance of [employer] voice-to-text.  Please excuse any spelling or grammatical errors.    Thank you for choosing Dr. Yannick Vance for your orthopedic hand and upper extremity care. It is our goal to provide you with exceptional care that will help keep you healthy, active, and get you back in the game.     If you felt that you received exemplary care today, please consider leaving feedback for Dr. Vance on [employer] at [email or web address].    Please do not hesitate to reach out to us via email, phone, or MyChart with any questions, concerns, or feedback.

## 2024-05-28 DIAGNOSIS — Z00.00 ENCOUNTER FOR MEDICARE ANNUAL WELLNESS EXAM: ICD-10-CM

## 2024-06-04 ENCOUNTER — LAB VISIT (OUTPATIENT)
Dept: LAB | Facility: HOSPITAL | Age: 69
End: 2024-06-04
Attending: FAMILY MEDICINE
Payer: MEDICARE

## 2024-06-04 DIAGNOSIS — E66.01 SEVERE OBESITY (BMI >= 40): ICD-10-CM

## 2024-06-04 DIAGNOSIS — I10 PRIMARY HYPERTENSION: ICD-10-CM

## 2024-06-04 LAB
ALBUMIN SERPL BCP-MCNC: 4.3 G/DL (ref 3.5–5.2)
ALP SERPL-CCNC: 84 U/L (ref 55–135)
ALT SERPL W/O P-5'-P-CCNC: 48 U/L (ref 10–44)
ANION GAP SERPL CALC-SCNC: 11 MMOL/L (ref 8–16)
AST SERPL-CCNC: 34 U/L (ref 10–40)
BASOPHILS # BLD AUTO: 0.05 K/UL (ref 0–0.2)
BASOPHILS NFR BLD: 0.8 % (ref 0–1.9)
BILIRUB SERPL-MCNC: 0.8 MG/DL (ref 0.1–1)
BUN SERPL-MCNC: 11 MG/DL (ref 8–23)
CALCIUM SERPL-MCNC: 10.1 MG/DL (ref 8.7–10.5)
CHLORIDE SERPL-SCNC: 104 MMOL/L (ref 95–110)
CHOLEST SERPL-MCNC: 154 MG/DL (ref 120–199)
CHOLEST/HDLC SERPL: 4.2 {RATIO} (ref 2–5)
CO2 SERPL-SCNC: 23 MMOL/L (ref 23–29)
CREAT SERPL-MCNC: 1 MG/DL (ref 0.5–1.4)
DIFFERENTIAL METHOD BLD: NORMAL
EOSINOPHIL # BLD AUTO: 0.1 K/UL (ref 0–0.5)
EOSINOPHIL NFR BLD: 1.2 % (ref 0–8)
ERYTHROCYTE [DISTWIDTH] IN BLOOD BY AUTOMATED COUNT: 12.6 % (ref 11.5–14.5)
EST. GFR  (NO RACE VARIABLE): >60 ML/MIN/1.73 M^2
GLUCOSE SERPL-MCNC: 137 MG/DL (ref 70–110)
HCT VFR BLD AUTO: 47.8 % (ref 40–54)
HDLC SERPL-MCNC: 37 MG/DL (ref 40–75)
HDLC SERPL: 24 % (ref 20–50)
HGB BLD-MCNC: 16.1 G/DL (ref 14–18)
IMM GRANULOCYTES # BLD AUTO: 0.02 K/UL (ref 0–0.04)
IMM GRANULOCYTES NFR BLD AUTO: 0.3 % (ref 0–0.5)
LDLC SERPL CALC-MCNC: 63 MG/DL (ref 63–159)
LYMPHOCYTES # BLD AUTO: 1.6 K/UL (ref 1–4.8)
LYMPHOCYTES NFR BLD: 27.5 % (ref 18–48)
MCH RBC QN AUTO: 30.3 PG (ref 27–31)
MCHC RBC AUTO-ENTMCNC: 33.7 G/DL (ref 32–36)
MCV RBC AUTO: 90 FL (ref 82–98)
MONOCYTES # BLD AUTO: 0.9 K/UL (ref 0.3–1)
MONOCYTES NFR BLD: 14.7 % (ref 4–15)
NEUTROPHILS # BLD AUTO: 3.3 K/UL (ref 1.8–7.7)
NEUTROPHILS NFR BLD: 55.5 % (ref 38–73)
NONHDLC SERPL-MCNC: 117 MG/DL
NRBC BLD-RTO: 0 /100 WBC
PLATELET # BLD AUTO: 240 K/UL (ref 150–450)
PMV BLD AUTO: 12.4 FL (ref 9.2–12.9)
POTASSIUM SERPL-SCNC: 3.8 MMOL/L (ref 3.5–5.1)
PROT SERPL-MCNC: 7.5 G/DL (ref 6–8.4)
RBC # BLD AUTO: 5.31 M/UL (ref 4.6–6.2)
SODIUM SERPL-SCNC: 138 MMOL/L (ref 136–145)
TRIGL SERPL-MCNC: 270 MG/DL (ref 30–150)
TSH SERPL DL<=0.005 MIU/L-ACNC: 3.69 UIU/ML (ref 0.4–4)
WBC # BLD AUTO: 5.9 K/UL (ref 3.9–12.7)

## 2024-06-04 PROCEDURE — 85025 COMPLETE CBC W/AUTO DIFF WBC: CPT | Performed by: FAMILY MEDICINE

## 2024-06-04 PROCEDURE — 80053 COMPREHEN METABOLIC PANEL: CPT | Performed by: FAMILY MEDICINE

## 2024-06-04 PROCEDURE — 80061 LIPID PANEL: CPT | Performed by: FAMILY MEDICINE

## 2024-06-04 PROCEDURE — 84443 ASSAY THYROID STIM HORMONE: CPT | Performed by: FAMILY MEDICINE

## 2024-06-04 PROCEDURE — 36415 COLL VENOUS BLD VENIPUNCTURE: CPT | Mod: PO | Performed by: FAMILY MEDICINE

## 2024-06-11 ENCOUNTER — OFFICE VISIT (OUTPATIENT)
Dept: FAMILY MEDICINE | Facility: CLINIC | Age: 69
End: 2024-06-11
Payer: MEDICARE

## 2024-06-11 ENCOUNTER — LAB VISIT (OUTPATIENT)
Dept: LAB | Facility: HOSPITAL | Age: 69
End: 2024-06-11
Attending: FAMILY MEDICINE
Payer: MEDICARE

## 2024-06-11 VITALS
DIASTOLIC BLOOD PRESSURE: 80 MMHG | HEIGHT: 67 IN | OXYGEN SATURATION: 98 % | WEIGHT: 260.06 LBS | SYSTOLIC BLOOD PRESSURE: 132 MMHG | BODY MASS INDEX: 40.82 KG/M2 | HEART RATE: 84 BPM

## 2024-06-11 DIAGNOSIS — E66.01 SEVERE OBESITY (BMI >= 40): Primary | ICD-10-CM

## 2024-06-11 DIAGNOSIS — R73.03 PREDIABETES: ICD-10-CM

## 2024-06-11 DIAGNOSIS — R21 RASH: ICD-10-CM

## 2024-06-11 DIAGNOSIS — I10 PRIMARY HYPERTENSION: ICD-10-CM

## 2024-06-11 LAB
ESTIMATED AVG GLUCOSE: 123 MG/DL (ref 68–131)
HBA1C MFR BLD: 5.9 % (ref 4–5.6)

## 2024-06-11 PROCEDURE — 1126F AMNT PAIN NOTED NONE PRSNT: CPT | Mod: CPTII,S$GLB,, | Performed by: FAMILY MEDICINE

## 2024-06-11 PROCEDURE — 1160F RVW MEDS BY RX/DR IN RCRD: CPT | Mod: CPTII,S$GLB,, | Performed by: FAMILY MEDICINE

## 2024-06-11 PROCEDURE — 3008F BODY MASS INDEX DOCD: CPT | Mod: CPTII,S$GLB,, | Performed by: FAMILY MEDICINE

## 2024-06-11 PROCEDURE — 3079F DIAST BP 80-89 MM HG: CPT | Mod: CPTII,S$GLB,, | Performed by: FAMILY MEDICINE

## 2024-06-11 PROCEDURE — 99999 PR PBB SHADOW E&M-EST. PATIENT-LVL III: CPT | Mod: PBBFAC,,, | Performed by: FAMILY MEDICINE

## 2024-06-11 PROCEDURE — 3288F FALL RISK ASSESSMENT DOCD: CPT | Mod: CPTII,S$GLB,, | Performed by: FAMILY MEDICINE

## 2024-06-11 PROCEDURE — 1101F PT FALLS ASSESS-DOCD LE1/YR: CPT | Mod: CPTII,S$GLB,, | Performed by: FAMILY MEDICINE

## 2024-06-11 PROCEDURE — 36415 COLL VENOUS BLD VENIPUNCTURE: CPT | Mod: PO | Performed by: FAMILY MEDICINE

## 2024-06-11 PROCEDURE — 1159F MED LIST DOCD IN RCRD: CPT | Mod: CPTII,S$GLB,, | Performed by: FAMILY MEDICINE

## 2024-06-11 PROCEDURE — 99215 OFFICE O/P EST HI 40 MIN: CPT | Mod: S$GLB,,, | Performed by: FAMILY MEDICINE

## 2024-06-11 PROCEDURE — 3075F SYST BP GE 130 - 139MM HG: CPT | Mod: CPTII,S$GLB,, | Performed by: FAMILY MEDICINE

## 2024-06-11 PROCEDURE — 83036 HEMOGLOBIN GLYCOSYLATED A1C: CPT | Performed by: FAMILY MEDICINE

## 2024-06-11 RX ORDER — BETAMETHASONE VALERATE 1 MG/G
CREAM TOPICAL 2 TIMES DAILY
Qty: 15 G | Refills: 0 | Status: SHIPPED | OUTPATIENT
Start: 2024-06-11

## 2024-06-11 NOTE — PROGRESS NOTES
Subjective     Patient ID: Darrian Velez is a 69 y.o. male.    Chief Complaint: Hypertension    69 years old male came to the clinic for blood pressure check.  Blood pressure today was stable.  Patient with a BMI of 40 currently trying to lose weight.  Last A1c at the level of prediabetes.  Patient with rash over the right arm.    Hypertension  This is a chronic problem. The current episode started more than 1 year ago. The problem is unchanged. The problem is controlled. Pertinent negatives include no chest pain, orthopnea, palpitations, peripheral edema or PND. There are no associated agents to hypertension. Risk factors for coronary artery disease include sedentary lifestyle, obesity, male gender and dyslipidemia. Past treatments include angiotensin blockers and diuretics. The current treatment provides significant improvement. Compliance problems include diet and exercise.  There is no history of angina. There is no history of chronic renal disease, a hypertension causing med or a thyroid problem.   Rash  This is a chronic problem. The current episode started more than 1 year ago. The problem is unchanged. The affected locations include the right arm. The rash is characterized by dryness. He was exposed to nothing. Past treatments include topical steroids. The treatment provided mild relief.     Review of Systems   Constitutional: Negative.    HENT: Negative.     Eyes: Negative.    Respiratory: Negative.     Cardiovascular: Negative.  Negative for chest pain, palpitations, orthopnea and PND.   Gastrointestinal: Negative.    Genitourinary: Negative.    Musculoskeletal: Negative.    Integumentary:  Positive for rash.   Neurological: Negative.    Psychiatric/Behavioral: Negative.            Objective     Physical Exam  Vitals and nursing note reviewed.   Constitutional:       General: He is not in acute distress.     Appearance: He is well-developed. He is not diaphoretic.   HENT:      Head: Normocephalic and  atraumatic.      Right Ear: External ear normal.      Left Ear: External ear normal.      Nose: Nose normal.      Mouth/Throat:      Pharynx: No oropharyngeal exudate.   Eyes:      General: No scleral icterus.        Right eye: No discharge.         Left eye: No discharge.      Conjunctiva/sclera: Conjunctivae normal.      Pupils: Pupils are equal, round, and reactive to light.   Neck:      Thyroid: No thyromegaly.      Vascular: No JVD.      Trachea: No tracheal deviation.   Cardiovascular:      Rate and Rhythm: Normal rate and regular rhythm.      Heart sounds: Normal heart sounds. No murmur heard.     No friction rub. No gallop.   Pulmonary:      Effort: Pulmonary effort is normal. No respiratory distress.      Breath sounds: Normal breath sounds. No stridor. No wheezing or rales.   Chest:      Chest wall: No tenderness.   Abdominal:      General: Bowel sounds are normal. There is no distension.      Palpations: Abdomen is soft. There is no mass.      Tenderness: There is no abdominal tenderness. There is no guarding or rebound.   Musculoskeletal:         General: No tenderness. Normal range of motion.      Cervical back: Normal range of motion and neck supple.   Lymphadenopathy:      Cervical: No cervical adenopathy.   Skin:     General: Skin is warm and dry.      Coloration: Skin is not pale.      Findings: Rash (right arm.) present. No erythema.   Neurological:      Mental Status: He is alert and oriented to person, place, and time.      Cranial Nerves: No cranial nerve deficit.      Motor: No abnormal muscle tone.      Coordination: Coordination normal.      Deep Tendon Reflexes: Reflexes are normal and symmetric. Reflexes normal.   Psychiatric:         Behavior: Behavior normal.         Thought Content: Thought content normal.         Judgment: Judgment normal.            Assessment and Plan     1. Severe obesity (BMI >= 40)  -     Comprehensive Metabolic Panel; Future; Expected date: 06/11/2024  -     Lipid  Panel; Future; Expected date: 06/11/2024  -     TSH; Future; Expected date: 06/11/2024    2. Primary hypertension  -     Urinalysis; Future  -     Comprehensive Metabolic Panel; Future; Expected date: 06/11/2024  -     Lipid Panel; Future; Expected date: 06/11/2024  -     TSH; Future; Expected date: 06/11/2024  -     CBC Auto Differential; Future; Expected date: 06/11/2024    3. Prediabetes  -     Hemoglobin A1C; Future; Expected date: 06/11/2024  -     Hemoglobin A1C; Future; Expected date: 06/11/2024    4. Rash  -     betamethasone valerate 0.1% (VALISONE) 0.1 % Crea; Apply topically 2 (two) times daily.  Dispense: 15 g; Refill: 0          Continue monitoring blood pressure at home, low sodium diet.   I spent a total of 40 minutes on the day of the visit.This includes face to face time and non-face to face time preparing to see the patient (eg, review of tests), obtaining and/or reviewing separately obtained history, documenting clinical information in the electronic or other health record, independently interpreting results and communicating results to the patient/family/caregiver, or care coordinator.        Follow up in about 6 months (around 12/11/2024), or if symptoms worsen or fail to improve.

## 2024-06-20 ENCOUNTER — TELEPHONE (OUTPATIENT)
Dept: NEUROLOGY | Facility: CLINIC | Age: 69
End: 2024-06-20
Payer: MEDICARE

## 2024-07-15 ENCOUNTER — OFFICE VISIT (OUTPATIENT)
Dept: FAMILY MEDICINE | Facility: CLINIC | Age: 69
End: 2024-07-15
Payer: MEDICARE

## 2024-07-15 VITALS
WEIGHT: 256.5 LBS | DIASTOLIC BLOOD PRESSURE: 64 MMHG | RESPIRATION RATE: 20 BRPM | BODY MASS INDEX: 40.26 KG/M2 | HEIGHT: 67 IN | HEART RATE: 89 BPM | OXYGEN SATURATION: 98 % | SYSTOLIC BLOOD PRESSURE: 136 MMHG

## 2024-07-15 DIAGNOSIS — E66.01 MORBID OBESITY WITH BMI OF 40.0-44.9, ADULT: ICD-10-CM

## 2024-07-15 DIAGNOSIS — E78.5 HYPERLIPIDEMIA, UNSPECIFIED HYPERLIPIDEMIA TYPE: ICD-10-CM

## 2024-07-15 DIAGNOSIS — H91.93 BILATERAL HEARING LOSS, UNSPECIFIED HEARING LOSS TYPE: ICD-10-CM

## 2024-07-15 DIAGNOSIS — R21 RASH: ICD-10-CM

## 2024-07-15 DIAGNOSIS — I10 HYPERTENSION, UNSPECIFIED TYPE: ICD-10-CM

## 2024-07-15 DIAGNOSIS — Z00.00 ENCOUNTER FOR MEDICARE ANNUAL WELLNESS EXAM: Primary | ICD-10-CM

## 2024-07-15 PROCEDURE — 1170F FXNL STATUS ASSESSED: CPT | Mod: CPTII,S$GLB,, | Performed by: NURSE PRACTITIONER

## 2024-07-15 PROCEDURE — 1159F MED LIST DOCD IN RCRD: CPT | Mod: CPTII,S$GLB,, | Performed by: NURSE PRACTITIONER

## 2024-07-15 PROCEDURE — 3288F FALL RISK ASSESSMENT DOCD: CPT | Mod: CPTII,S$GLB,, | Performed by: NURSE PRACTITIONER

## 2024-07-15 PROCEDURE — 3075F SYST BP GE 130 - 139MM HG: CPT | Mod: CPTII,S$GLB,, | Performed by: NURSE PRACTITIONER

## 2024-07-15 PROCEDURE — 1158F ADVNC CARE PLAN TLK DOCD: CPT | Mod: CPTII,S$GLB,, | Performed by: NURSE PRACTITIONER

## 2024-07-15 PROCEDURE — 1160F RVW MEDS BY RX/DR IN RCRD: CPT | Mod: CPTII,S$GLB,, | Performed by: NURSE PRACTITIONER

## 2024-07-15 PROCEDURE — 99999 PR PBB SHADOW E&M-EST. PATIENT-LVL V: CPT | Mod: PBBFAC,,, | Performed by: NURSE PRACTITIONER

## 2024-07-15 PROCEDURE — 3078F DIAST BP <80 MM HG: CPT | Mod: CPTII,S$GLB,, | Performed by: NURSE PRACTITIONER

## 2024-07-15 PROCEDURE — 1126F AMNT PAIN NOTED NONE PRSNT: CPT | Mod: CPTII,S$GLB,, | Performed by: NURSE PRACTITIONER

## 2024-07-15 PROCEDURE — 3044F HG A1C LEVEL LT 7.0%: CPT | Mod: CPTII,S$GLB,, | Performed by: NURSE PRACTITIONER

## 2024-07-15 PROCEDURE — 1100F PTFALLS ASSESS-DOCD GE2>/YR: CPT | Mod: CPTII,S$GLB,, | Performed by: NURSE PRACTITIONER

## 2024-07-15 PROCEDURE — G0439 PPPS, SUBSEQ VISIT: HCPCS | Mod: S$GLB,,, | Performed by: NURSE PRACTITIONER

## 2024-07-15 NOTE — PATIENT INSTRUCTIONS
Counseling and Referral of Other Preventative  (Italic type indicates deductible and co-insurance are waived)    Patient Name: Darrian Velez  Today's Date: 7/15/2024    Health Maintenance       Date Due Completion Date    RSV Vaccine (Age 60+ and Pregnant patients) (1 - 1-dose 60+ series) Never done ---    COVID-19 Vaccine (2 - 2023-24 season) 09/01/2023 12/17/2022    Influenza Vaccine (1) 09/01/2024 10/12/2023    PROSTATE-SPECIFIC ANTIGEN 11/27/2024 11/27/2023    Hemoglobin A1c (Prediabetes) 06/11/2025 6/11/2024    Colorectal Cancer Screening 07/22/2025 7/22/2020    TETANUS VACCINE 08/08/2026 8/8/2016    Lipid Panel 06/04/2029 6/4/2024        Orders Placed This Encounter   Procedures    Ambulatory referral/consult to Audiology    Ambulatory referral/consult to ENT       The following information is provided to all patients.  This information is to help you find resources for any of the problems found today that may be affecting your health:                  Living healthy guide: www.[facility].[place].gov      Understanding Diabetes: www.diabetes.org      Eating healthy: www.cdc.gov/healthyweight      CDC home safety checklist: www.cdc.gov/steadi/patient.html      Agency on Aging: www.goea.[place].gov      Alcoholics anonymous (AA): www.aa.org      Physical Activity: www.rafael.nih.gov/oj4lpby      Tobacco use: www.quitwithusla.org

## 2024-07-15 NOTE — PROGRESS NOTES
"Darrian Velez presented for a  Medicare AWV and comprehensive Health Risk Assessment today. The following components were reviewed and updated:    Medical history  Family History  Social history  Allergies and Current Medications  Health Risk Assessment  Health Maintenance  Care Team         ** See Completed Assessments for Annual Wellness Visit within the encounter summary.**         The following assessments were completed:  Living Situation  CAGE  Depression Screening  Timed Get Up and Go  Whisper Test  Cognitive Function Screening      Nutrition Screening  ADL Screening  PAQ Screening      Opioid documentation:      Patient does not have a current opioid prescription.        Vitals:    07/15/24 1353   BP: 136/64   BP Location: Left arm   Patient Position: Sitting   BP Method: Medium (Manual)   Pulse: 89   Resp: 20   SpO2: 98%   Weight: 116.3 kg (256 lb 8.1 oz)   Height: 5' 7" (1.702 m)     Body mass index is 40.17 kg/m².    Physical Exam  Vitals reviewed.   Constitutional:       General: He is awake. He is not in acute distress.     Appearance: Normal appearance. He is well-developed and well-groomed. He is morbidly obese.   HENT:      Head: Normocephalic.      Right Ear: External ear normal.      Left Ear: External ear normal.   Eyes:      General:         Right eye: No discharge.         Left eye: No discharge.   Cardiovascular:      Rate and Rhythm: Normal rate.   Pulmonary:      Effort: Pulmonary effort is normal. No respiratory distress.   Skin:     Coloration: Skin is not pale.      Findings: Rash present.             Comments: Rash as noted on attached photo  Nonpruritic, nontender, no drainage or warmth; slightly raised, similar appearance to tinea corporis   Neurological:      Mental Status: He is alert and oriented to person, place, and time.      Coordination: Coordination normal.      Gait: Gait normal.   Psychiatric:         Attention and Perception: Attention normal.         Mood and Affect: Mood " "and affect normal.         Speech: Speech normal.         Behavior: Behavior normal. Behavior is cooperative.         Thought Content: Thought content normal.             Diagnoses and health risks identified today and associated recommendations/orders:    1. Encounter for Medicare annual wellness exam  - Ambulatory Referral/Consult to Enhanced Annual Wellness Visit (eAWV)    2. Hypertension, unspecified type  Chronic; stable on Hyzaar medication. Follow up with PCP.    3. Hyperlipidemia, unspecified hyperlipidemia type  Chronic; stable on statin and fenofibrate medication. Follow up with PCP.    4. Rash  Chronic; stable. Present for over 2 years; has been prescribed Lotrisone and betamethasone creams that do not help. Denies itching, pain, burning, drainage, warmth. Says it may occasionally "flare" where it gets more red in color with some minor swelling. Otherwise has been stable. Not bothersome but is concerned and requesting input from Dermatology. Has tried switching to unscented detergent. Discussed benefit of e-consult and patient agrees.  - E-Consult to Dermatology    5. Bilateral hearing loss, unspecified hearing loss type  - Ambulatory referral/consult to Audiology; Future  - Ambulatory referral/consult to ENT; Future    6. Morbid obesity with BMI of 40.0-44.9, adult  Eat a low salt/low fat diet and discussed importance of engaging in physical activity at least 5x/week for a minimum of 30 min/day.      Provided Darrian with a 5-10 year written screening schedule and personal prevention plan. Recommendations were developed using the USPSTF age appropriate recommendations. Education, counseling, and referrals were provided as needed. After Visit Summary given to patient which includes a list of additional screenings/tests needed.    Follow up for your next annual wellness visit.    Melvina Key NP      Advance Care Planning     I offered to discuss advanced care planning, including how to pick a person " who would make decisions for you if you were unable to make them for yourself, called a health care power of , and what kind of decisions you might make such as use of life sustaining treatments such as ventilators and tube feeding when faced with a life limiting illness recorded on a living will that they will need to know. (How you want to be cared for as you near the end of your natural life)     X Patient is interested in learning more about how to make advanced directives.  I provided them paperwork and offered to discuss this with them.

## 2024-07-18 ENCOUNTER — E-CONSULT (OUTPATIENT)
Dept: DERMATOLOGY | Facility: CLINIC | Age: 69
End: 2024-07-18
Payer: MEDICARE

## 2024-07-18 DIAGNOSIS — D48.5 NEOPLASM OF UNCERTAIN BEHAVIOR OF SKIN: Primary | ICD-10-CM

## 2024-07-18 PROCEDURE — 99451 NTRPROF PH1/NTRNET/EHR 5/>: CPT | Mod: S$GLB,,, | Performed by: DERMATOLOGY

## 2024-07-18 NOTE — CONSULTS
[facility] Dermatology 4th Floor  Response for E-Consult     Patient Name: Darrian Velez  MRN: [id number]  Primary Care Provider: Balwinder Alvarado MD   Requesting Provider: Melvina Key NP  E-Consult to Dermatology  Consult performed by: Arleth Loera MD  Consult ordered by: Melvina Key NP  Reason for consult: Appears suspicious for possible squamous cell carcinoma in Situ or superficial basal cell  Assessment/Recommendations: Recommend patient follows up with Dermatology in person for potential biopsy.  Differential diagnosis includes squamous cell carcinoma in situ, superficial basal cell carcinoma or tinea corporis (benign).  Given history of being present for several years and lack of spread to other sites, consider possibility of a slow growing skin cancer.  Recommend patient follow up up with Dermatology in next 1-2 months for biopsy/diagnosis.  We will reach out to patient for scheduling, however patient can also be seen within the [facility] or outside of [facility] if he prefers for sooner appt.          Recommendation: f/u in person for biopsy    Contingency that warrants a repeat eConsult or referral: recommend f/u    Total time of Consultation: 5 minute    I did not speak to the requesting provider verbally about this.     *This eConsult is based on the clinical data available to me and is furnished without benefit of a physical examination. The eConsult will need to be interpreted in light of any clinical issues or changes in patient status not available to me at the time of filing this eConsults. Significant changes in patient condition or level of acuity should result in immediate formal consultation and reevaluation. Please alert me if you have further questions.    Thank you for this eConsult referral.     Arleth Loera MD  [facility] Dermatology 4th Floor

## 2024-07-19 ENCOUNTER — PATIENT MESSAGE (OUTPATIENT)
Dept: FAMILY MEDICINE | Facility: CLINIC | Age: 69
End: 2024-07-19
Payer: MEDICARE

## 2024-07-19 ENCOUNTER — TELEPHONE (OUTPATIENT)
Dept: DERMATOLOGY | Facility: CLINIC | Age: 69
End: 2024-07-19
Payer: MEDICARE

## 2024-07-19 NOTE — TELEPHONE ENCOUNTER
Received consult. Contacted pt to schedule appt with Dr. Loera to complete biopsy. Pt stated that he is not to travel to [facility] to see Dr. Loera. Did not schedule pt. Pt stated he will reach out to PCP to find someone local to him.

## 2024-07-24 ENCOUNTER — OFFICE VISIT (OUTPATIENT)
Dept: DERMATOLOGY | Facility: CLINIC | Age: 69
End: 2024-07-24
Payer: MEDICARE

## 2024-07-24 DIAGNOSIS — Z12.83 SCREENING EXAM FOR SKIN CANCER: ICD-10-CM

## 2024-07-24 DIAGNOSIS — D48.5 NEOPLASM OF UNCERTAIN BEHAVIOR OF SKIN: Primary | ICD-10-CM

## 2024-07-24 DIAGNOSIS — L82.1 SEBORRHEIC KERATOSES: ICD-10-CM

## 2024-07-24 PROCEDURE — 99999 PR PBB SHADOW E&M-EST. PATIENT-LVL III: CPT | Mod: PBBFAC,,, | Performed by: STUDENT IN AN ORGANIZED HEALTH CARE EDUCATION/TRAINING PROGRAM

## 2024-07-24 PROCEDURE — 99203 OFFICE O/P NEW LOW 30 MIN: CPT | Mod: 25,S$GLB,, | Performed by: STUDENT IN AN ORGANIZED HEALTH CARE EDUCATION/TRAINING PROGRAM

## 2024-07-24 PROCEDURE — 1159F MED LIST DOCD IN RCRD: CPT | Mod: CPTII,S$GLB,, | Performed by: STUDENT IN AN ORGANIZED HEALTH CARE EDUCATION/TRAINING PROGRAM

## 2024-07-24 PROCEDURE — 88305 TISSUE EXAM BY PATHOLOGIST: CPT | Performed by: DERMATOLOGY

## 2024-07-24 PROCEDURE — 3288F FALL RISK ASSESSMENT DOCD: CPT | Mod: CPTII,S$GLB,, | Performed by: STUDENT IN AN ORGANIZED HEALTH CARE EDUCATION/TRAINING PROGRAM

## 2024-07-24 PROCEDURE — 1126F AMNT PAIN NOTED NONE PRSNT: CPT | Mod: CPTII,S$GLB,, | Performed by: STUDENT IN AN ORGANIZED HEALTH CARE EDUCATION/TRAINING PROGRAM

## 2024-07-24 PROCEDURE — 1100F PTFALLS ASSESS-DOCD GE2>/YR: CPT | Mod: CPTII,S$GLB,, | Performed by: STUDENT IN AN ORGANIZED HEALTH CARE EDUCATION/TRAINING PROGRAM

## 2024-07-24 PROCEDURE — 1160F RVW MEDS BY RX/DR IN RCRD: CPT | Mod: CPTII,S$GLB,, | Performed by: STUDENT IN AN ORGANIZED HEALTH CARE EDUCATION/TRAINING PROGRAM

## 2024-07-24 PROCEDURE — 11102 TANGNTL BX SKIN SINGLE LES: CPT | Mod: S$GLB,,, | Performed by: STUDENT IN AN ORGANIZED HEALTH CARE EDUCATION/TRAINING PROGRAM

## 2024-07-24 PROCEDURE — 3044F HG A1C LEVEL LT 7.0%: CPT | Mod: CPTII,S$GLB,, | Performed by: STUDENT IN AN ORGANIZED HEALTH CARE EDUCATION/TRAINING PROGRAM

## 2024-07-24 NOTE — PROGRESS NOTES
Subjective:      Patient ID:  Darrian Velez is a 69 y.o. male who presents for   Chief Complaint   Patient presents with    Lesion     Darrian Velez is a 69 y.o. male who presents for: evaluation of skin lesions.    New patient    The patient has the following lesions of concern:  Location: right arm   Duration: 2 years   Symptoms: none, does not itch. Flares up   Relieving factors/Previous treatments: 2 topical creams     Pertinent history:  History of blistering sunburns: No  History of tanning bed use: No  Family history of melanoma: No  Personal history of mole removal: No  Personal history of skin cancer: No         Review of Systems   Skin:  Positive for activity-related sunscreen use and wears hat. Negative for daily sunscreen use and recent sunburn.   Hematologic/Lymphatic: Bruises/bleeds easily (aspirin daily).       Objective:   Physical Exam   Skin:   Areas Examined (abnormalities noted in diagram):   Scalp / Hair Palpated and Inspected  Head / Face Inspection Performed  Neck Inspection Performed  Back Inspection Performed  RUE Inspected            Diagram Legend     Erythematous scaling macule/papule c/w actinic keratosis       Vascular papule c/w angioma      Pigmented verrucoid papule/plaque c/w seborrheic keratosis      Yellow umbilicated papule c/w sebaceous hyperplasia      Irregularly shaped tan macule c/w lentigo     1-2 mm smooth white papules consistent with Milia      Movable subcutaneous cyst with punctum c/w epidermal inclusion cyst      Subcutaneous movable cyst c/w pilar cyst      Firm pink to brown papule c/w dermatofibroma      Pedunculated fleshy papule(s) c/w skin tag(s)      Evenly pigmented macule c/w junctional nevus     Mildly variegated pigmented, slightly irregular-bordered macule c/w mildly atypical nevus      Flesh colored to evenly pigmented papule c/w intradermal nevus       Pink pearly papule/plaque c/w basal cell carcinoma      Erythematous hyperkeratotic cursted  plaque c/w SCC      Surgical scar with no sign of skin cancer recurrence      Open and closed comedones      Inflammatory papules and pustules      Verrucoid papule consistent consistent with wart     Erythematous eczematous patches and plaques     Dystrophic onycholytic nail with subungual debris c/w onychomycosis     Umbilicated papule    Erythematous-base heme-crusted tan verrucoid plaque consistent with inflamed seborrheic keratosis     Erythematous Silvery Scaling Plaque c/w Psoriasis     See annotation              Assessment / Plan:      Pathology Orders:       Normal Orders This Visit    Specimen to Pathology, Dermatology     Questions:    Procedure Type: Dermatology and skin neoplasms    Number of Specimens: 1    ------------------------: -------------------------    Spec 1 Procedure: Biopsy    Spec 1 Clinical Impression: 1.9 cm erythematous arcuate scaly plaque- ddx NMSC v porokeratosis    Spec 1 Source: R volar forearm    Release to patient: Immediate    Release to patient:           Neoplasm of uncertain behavior of skin  -     Specimen to Pathology, Dermatology  Shave biopsy procedure note:    Shave biopsy performed after verbal consent including risk of infection, scar, recurrence, need for additional treatment of site. Area prepped with alcohol, anesthetized with approximately 1.0cc of 1% lidocaine with epinephrine. Lesional tissue shaved with razor blade. Hemostasis achieved with application of aluminum chloride followed by hyfrecation. No complications. Dressing applied. Wound care explained.    Seborrheic keratoses  These are benign inherited growths without a malignant potential. Reassurance given to patient. No treatment is necessary.     Screening exam for skin cancer  Upper body skin examination performed today including at least 6 points as noted in physical examination. No lesions suspicious for malignancy noted.    Recommend daily sun protection/avoidance and use of at least SPF 30, broad  spectrum sunscreen (OTC drug).     RTC 6 months for FBSE, sooner pending bx results

## 2024-07-26 DIAGNOSIS — D09.9 SQUAMOUS CELL CARCINOMA IN SITU: Primary | ICD-10-CM

## 2024-07-26 LAB
FINAL PATHOLOGIC DIAGNOSIS: NORMAL
GROSS: NORMAL
Lab: NORMAL
MICROSCOPIC EXAM: NORMAL

## 2024-07-26 RX ORDER — FLUOROURACIL 50 MG/G
CREAM TOPICAL
Qty: 40 G | Refills: 1 | Status: SHIPPED | OUTPATIENT
Start: 2024-07-26

## 2024-07-29 ENCOUNTER — TELEPHONE (OUTPATIENT)
Dept: DERMATOLOGY | Facility: CLINIC | Age: 69
End: 2024-07-29
Payer: MEDICARE

## 2024-07-29 NOTE — TELEPHONE ENCOUNTER
"Called and spoke to pt regarding efudex instructions and 3 mos f/u. Answered all pt questions. Pt confirmed appt date and time for f/u. He thanked me for my call.     ----- Message from Jasmina Brown sent at 7/29/2024 10:37 AM CDT -----  Regarding: pt adivce  Contact: [phone]  .Name Of Caller: Self     Contact Preference?:     What is the nature of the call?: Returning call to Amelia KNOX pls call      Additional Notes:  "Thank you for all that you do for our patients"  "

## 2024-07-29 NOTE — TELEPHONE ENCOUNTER
LVM regarding scheduling efudex f/u and UBSE in 3 mos.     ----- Message from Kathia Parsons MD sent at 7/26/2024  3:22 PM CDT -----  Please schedule 3 month efudex follow up/UBSE

## 2024-07-30 ENCOUNTER — OFFICE VISIT (OUTPATIENT)
Dept: OTOLARYNGOLOGY | Facility: CLINIC | Age: 69
End: 2024-07-30
Payer: MEDICARE

## 2024-07-30 ENCOUNTER — TELEPHONE (OUTPATIENT)
Dept: OTOLARYNGOLOGY | Facility: CLINIC | Age: 69
End: 2024-07-30

## 2024-07-30 ENCOUNTER — CLINICAL SUPPORT (OUTPATIENT)
Dept: OTOLARYNGOLOGY | Facility: CLINIC | Age: 69
End: 2024-07-30
Payer: MEDICARE

## 2024-07-30 VITALS
DIASTOLIC BLOOD PRESSURE: 77 MMHG | HEART RATE: 80 BPM | WEIGHT: 257.38 LBS | BODY MASS INDEX: 40.31 KG/M2 | SYSTOLIC BLOOD PRESSURE: 127 MMHG

## 2024-07-30 DIAGNOSIS — H90.3 SENSORINEURAL HEARING LOSS (SNHL) OF BOTH EARS: Chronic | ICD-10-CM

## 2024-07-30 DIAGNOSIS — H91.93 BILATERAL HEARING LOSS, UNSPECIFIED HEARING LOSS TYPE: ICD-10-CM

## 2024-07-30 DIAGNOSIS — H90.3 SENSORINEURAL HEARING LOSS, BILATERAL: Primary | ICD-10-CM

## 2024-07-30 PROCEDURE — 92567 TYMPANOMETRY: CPT | Mod: S$GLB,,, | Performed by: PHYSICIAN ASSISTANT

## 2024-07-30 PROCEDURE — 3008F BODY MASS INDEX DOCD: CPT | Mod: CPTII,S$GLB,, | Performed by: OTOLARYNGOLOGY

## 2024-07-30 PROCEDURE — 3288F FALL RISK ASSESSMENT DOCD: CPT | Mod: CPTII,S$GLB,, | Performed by: OTOLARYNGOLOGY

## 2024-07-30 PROCEDURE — 3044F HG A1C LEVEL LT 7.0%: CPT | Mod: CPTII,S$GLB,, | Performed by: OTOLARYNGOLOGY

## 2024-07-30 PROCEDURE — 1126F AMNT PAIN NOTED NONE PRSNT: CPT | Mod: CPTII,S$GLB,, | Performed by: OTOLARYNGOLOGY

## 2024-07-30 PROCEDURE — 1159F MED LIST DOCD IN RCRD: CPT | Mod: CPTII,S$GLB,, | Performed by: OTOLARYNGOLOGY

## 2024-07-30 PROCEDURE — 1160F RVW MEDS BY RX/DR IN RCRD: CPT | Mod: CPTII,S$GLB,, | Performed by: OTOLARYNGOLOGY

## 2024-07-30 PROCEDURE — 3078F DIAST BP <80 MM HG: CPT | Mod: CPTII,S$GLB,, | Performed by: OTOLARYNGOLOGY

## 2024-07-30 PROCEDURE — 92557 COMPREHENSIVE HEARING TEST: CPT | Mod: S$GLB,,, | Performed by: PHYSICIAN ASSISTANT

## 2024-07-30 PROCEDURE — 99999 PR PBB SHADOW E&M-EST. PATIENT-LVL III: CPT | Mod: PBBFAC,,, | Performed by: OTOLARYNGOLOGY

## 2024-07-30 PROCEDURE — 1101F PT FALLS ASSESS-DOCD LE1/YR: CPT | Mod: CPTII,S$GLB,, | Performed by: OTOLARYNGOLOGY

## 2024-07-30 PROCEDURE — 99203 OFFICE O/P NEW LOW 30 MIN: CPT | Mod: S$GLB,,, | Performed by: OTOLARYNGOLOGY

## 2024-07-30 PROCEDURE — 3074F SYST BP LT 130 MM HG: CPT | Mod: CPTII,S$GLB,, | Performed by: OTOLARYNGOLOGY

## 2024-07-30 PROCEDURE — 99999 PR PBB SHADOW E&M-EST. PATIENT-LVL II: CPT | Mod: PBBFAC,,, | Performed by: PHYSICIAN ASSISTANT

## 2024-07-30 NOTE — PROGRESS NOTES
Chief Complaint   Patient presents with    Hearing Loss     Both ears         HPI: Patient is a very pleasant 69 y.o. male here to see me today for the first time for evaluation of hearing loss.  He reports hearing loss that has been gradually progressing over the >20yrs  year(s).  He has not noted any difference in hearing between the ears, with both ears being the better hearing ear.  He has not noted any tinnitus in either ear.  He has not had any recent issues with ear pain or ear drainage.  He has a family history of hearing loss, and has not had any previous otologic surgery.  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 He denies issues with dizziness.                Past Medical History:   Diagnosis Date    ALLERGIC RHINITIS     Hx of colonic polyps 4/05/2012    Tubular adenoma    Hyperlipidemia     Hypertension     MRSA infection     left medial thigh abscess & cellulitis    Personal history of kidney stones      Social History     Socioeconomic History    Marital status:    Occupational History    Occupation:    Tobacco Use    Smoking status: Never    Smokeless tobacco: Never   Substance and Sexual Activity    Alcohol use: Not Currently     Comment: occasionally     Drug use: No    Sexual activity: Not Currently     Partners: Female     Social Determinants of Health     Financial Resource Strain: Low Risk  (7/14/2024)    Overall Financial Resource Strain (CARDIA)     Difficulty of Paying Living Expenses: Not hard at all   Food Insecurity: No Food Insecurity (7/14/2024)    Hunger Vital Sign     Worried About Running Out of Food in the Last Year: Never true     Ran Out of Food in the Last Year: Never true   Transportation Needs: No Transportation Needs (7/14/2024)    PRAPARE - Transportation     Lack of Transportation (Medical): No     Lack of Transportation (Non-Medical): No   Physical Activity: Unknown (7/14/2024)     Exercise Vital Sign     Days of Exercise per Week: 0 days   Stress: No Stress Concern Present (2024)    [employer] - Occupational Stress Questionnaire     Feeling of Stress : Not at all   Housing Stability: Low Risk  (2024)    Housing Stability Vital Sign     Unable to Pay for Housing in the Last Year: No     Homeless in the Last Year: No     Past Surgical History:   Procedure Laterality Date    COLONOSCOPY N/A 2020    Procedure: COLONOSCOPY;  Surgeon: Donato Shetty MD;  Location: [facility];  Service: Endoscopy;  Laterality: N/A;    HERNIA REPAIR      umbilical hernia repair with mesh    OPEN REDUCTION AND INTERNAL FIXATION (ORIF) OF FRACTURE OF OLECRANON PROCESS OF ULNA Left 2023    Procedure: ORIF, FRACTURE, OLECRANON,;  Surgeon: Santosh Zavala MD;  Location: [facility] OR [address] [place] [address];  Service: Orthopedics;  Laterality: Left;    REPLACEMENT OF HEAD OF RADIUS Left 2023    Procedure: REPLACEMENT, RADIUS, HEAD;  Surgeon: Santosh Zavala MD;  Location: [facility] OR [address] [place] [address];  Service: Orthopedics;  Laterality: Left;    VASECTOMY       Family History   Problem Relation Name Age of Onset    Breast cancer Mother      Hypertension Mother      Dementia Mother      Coronary artery disease Father           age 59    Heart disease Father      No Known Problems Sister x1     Testicular cancer Brother x1     No Known Problems Daughter x1     No Known Problems Son x1     Colon cancer Maternal Grandmother             Review of Systems  General: negative for chills, fever or weight loss  Psychological: negative for mood changes or depression  Ophthalmic: negative for blurry vision, photophobia or eye pain  ENT: see HPI  Respiratory: no cough, shortness of breath, or wheezing  Cardiovascular: no chest pain or dyspnea on exertion  Gastrointestinal: no abdominal pain, change in bowel habits, or black/ bloody stools  Musculoskeletal: negative for gait disturbance or  muscular weakness  Neurological: no syncope or seizures; no ataxia  Dermatological: negative for pruritis,  rash and jaundice  Hematologic/lymphatic: no easy bruising, no new adenopathy      Physical Exam:    Vitals:    07/30/24 1033   BP: 127/77   Pulse: 80       Physical Exam        Audiogram: Interpreted by me and reviewed with the patient today.  Mild sloping to moderate hearing loss, sensorineural, bilateral, symmetric.  Tympanograms type a bilaterally.      Assessment:    ICD-10-CM ICD-9-CM    1. Sensorineural hearing loss (SNHL) of both ears  H90.3 389.18 Ambulatory referral/consult to ENT        The encounter diagnosis was Sensorineural hearing loss (SNHL) of both ears.      Plan:    We reviewed the patient's recent audiogram and hearing loss in detail.  We also discussed that he is a excellent candidate for hearing aids, if and when he the patient is motivated.  He was given handouts with information and pricing of hearing aids, and will contact audiology when ready to proceed.  We also discussed the use hearing protection when exposed to loud noise, including lawn equipment.      Lillian Vaca MD

## 2024-07-30 NOTE — TELEPHONE ENCOUNTER
Left voicemail to schedule patient for HAC    ----- Message from Steph Yung MA sent at 7/30/2024 11:09 AM CDT -----  Regarding: Hearing Aid Appointment  Good morning Caroline Ann wants this patient to been about getting hearing aids.

## 2024-07-30 NOTE — PATIENT INSTRUCTIONS
We reviewed the patient's recent audiogram and hearing loss in detail.  We also discussed that he is a excellent candidate for hearing aids, if and when he the patient is motivated.  He was given handouts with information and pricing of hearing aids, and will contact audiology when ready to proceed.  We also discussed the use hearing protection when exposed to loud noise, including lawn equipment.

## 2024-07-30 NOTE — PROGRESS NOTES
Darrian Velez, a 69 y.o. male, was seen today in the clinic for an audiologic evaluation.  Patients main complaint was bilateral hearing loss that has been progressing over the years.  He reports having to turn the TV louder to hear better and has difficulty understanding conversation particularly in noisy environments. He denies tinnitus, ear pain or ear drainage.      Audiogram results revealed a mild to moderate sensorineural hearing loss bilaterally.  Speech reception thresholds were noted at 35 dB in the right ear and 35 dB in the left ear.  Speech discrimination scores were 84% in the right ear and 80% in the left ear.  Tympanometry revealed Type Ad in the right ear and Type A in the left ear.     Recommendations:  Otologic evaluation  Annual audiogram  Hearing protection when in noise  Hearing aid consultation

## 2024-08-26 ENCOUNTER — CLINICAL SUPPORT (OUTPATIENT)
Dept: OTOLARYNGOLOGY | Facility: CLINIC | Age: 69
End: 2024-08-26
Payer: MEDICARE

## 2024-08-26 DIAGNOSIS — H90.3 SENSORINEURAL HEARING LOSS (SNHL) OF BOTH EARS: Primary | ICD-10-CM

## 2024-08-26 PROCEDURE — 99499 UNLISTED E&M SERVICE: CPT | Mod: S$GLB,,, | Performed by: AUDIOLOGIST

## 2024-08-26 NOTE — PROGRESS NOTES
Darrian Rajniitz was seen today in the clinic for a hearing aid consult.  He was able to demo a pair of Phonak Audeo L90-R trial hearing aids during the appointment.  I discussed in detail with him the hearing aid technology levels, pricing, policies and procedures and a recommendation was made for a pair of Advanced level hearing aids.  He decided to proceed with the order and measurements were made, #3 M  color P8.  The hearing aid order form was signed and a copy given to him.  The patient access rep was not in today so the deposit was not able to be paid today and will be collected at the fitting.  The hearing aid fitting was scheduled 9/16/24.  He will contact me with any questions.

## 2024-09-05 ENCOUNTER — OFFICE VISIT (OUTPATIENT)
Dept: NEUROLOGY | Facility: CLINIC | Age: 69
End: 2024-09-05
Payer: MEDICARE

## 2024-09-05 VITALS
HEART RATE: 85 BPM | BODY MASS INDEX: 40.34 KG/M2 | HEIGHT: 67 IN | DIASTOLIC BLOOD PRESSURE: 83 MMHG | WEIGHT: 257 LBS | SYSTOLIC BLOOD PRESSURE: 147 MMHG

## 2024-09-05 DIAGNOSIS — R20.0 LEFT ARM NUMBNESS: Primary | ICD-10-CM

## 2024-09-05 PROCEDURE — 3008F BODY MASS INDEX DOCD: CPT | Mod: CPTII,S$GLB,, | Performed by: STUDENT IN AN ORGANIZED HEALTH CARE EDUCATION/TRAINING PROGRAM

## 2024-09-05 PROCEDURE — 1126F AMNT PAIN NOTED NONE PRSNT: CPT | Mod: CPTII,S$GLB,, | Performed by: STUDENT IN AN ORGANIZED HEALTH CARE EDUCATION/TRAINING PROGRAM

## 2024-09-05 PROCEDURE — 3288F FALL RISK ASSESSMENT DOCD: CPT | Mod: CPTII,S$GLB,, | Performed by: STUDENT IN AN ORGANIZED HEALTH CARE EDUCATION/TRAINING PROGRAM

## 2024-09-05 PROCEDURE — 99214 OFFICE O/P EST MOD 30 MIN: CPT | Mod: S$GLB,,, | Performed by: STUDENT IN AN ORGANIZED HEALTH CARE EDUCATION/TRAINING PROGRAM

## 2024-09-05 PROCEDURE — 3079F DIAST BP 80-89 MM HG: CPT | Mod: CPTII,S$GLB,, | Performed by: STUDENT IN AN ORGANIZED HEALTH CARE EDUCATION/TRAINING PROGRAM

## 2024-09-05 PROCEDURE — 99999 PR PBB SHADOW E&M-EST. PATIENT-LVL III: CPT | Mod: PBBFAC,,, | Performed by: STUDENT IN AN ORGANIZED HEALTH CARE EDUCATION/TRAINING PROGRAM

## 2024-09-05 PROCEDURE — 1100F PTFALLS ASSESS-DOCD GE2>/YR: CPT | Mod: CPTII,S$GLB,, | Performed by: STUDENT IN AN ORGANIZED HEALTH CARE EDUCATION/TRAINING PROGRAM

## 2024-09-05 PROCEDURE — 3044F HG A1C LEVEL LT 7.0%: CPT | Mod: CPTII,S$GLB,, | Performed by: STUDENT IN AN ORGANIZED HEALTH CARE EDUCATION/TRAINING PROGRAM

## 2024-09-05 PROCEDURE — 3077F SYST BP >= 140 MM HG: CPT | Mod: CPTII,S$GLB,, | Performed by: STUDENT IN AN ORGANIZED HEALTH CARE EDUCATION/TRAINING PROGRAM

## 2024-09-05 RX ORDER — PREGABALIN 50 MG/1
50 CAPSULE ORAL 3 TIMES DAILY
Qty: 90 CAPSULE | Refills: 6 | Status: SHIPPED | OUTPATIENT
Start: 2024-09-05 | End: 2025-03-06
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
## 2024-09-16 ENCOUNTER — TELEPHONE (OUTPATIENT)
Dept: OTOLARYNGOLOGY | Facility: CLINIC | Age: 69
End: 2024-09-16
Payer: MEDICARE

## 2024-09-16 NOTE — TELEPHONE ENCOUNTER
Spoke with patient and rescheduled his appointment.    ----- Message from Guerita Garcia LPN sent at 9/16/2024  9:20 AM CDT -----  Contact: Pt.    ----- Message -----  From: Steve Orourke  Sent: 9/16/2024   9:09 AM CDT  To: Flora Velazquez Staff    .Type:  Needs Medical Advice    Who Called: pt    Would the patient rather a call back or a response via [facility]? Call back  Best Call Back Number:[phone]   Additional Information: Pt. Is returning a call back to the office regarding rescheduling his appt.

## 2024-09-17 ENCOUNTER — TELEPHONE (OUTPATIENT)
Dept: OTOLARYNGOLOGY | Facility: CLINIC | Age: 69
End: 2024-09-17
Payer: MEDICARE
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
## 2024-09-23 ENCOUNTER — CLINICAL SUPPORT (OUTPATIENT)
Dept: OTOLARYNGOLOGY | Facility: CLINIC | Age: 69
End: 2024-09-23
Payer: MEDICARE

## 2024-09-23 DIAGNOSIS — H90.3 SENSORINEURAL HEARING LOSS, BILATERAL: Primary | ICD-10-CM

## 2024-09-23 NOTE — PROGRESS NOTES
Darrian Velez was seen today in the clinic for a hearing aid fitting.  He was fit with a pair of Phonak Audeo L70-R hearing aids.  Real ear and feedback test was completed and the hearing aids were programmed.  The fit was good and he reported good subjective benefit.  He was not interested in bluetooth connections at today's visit but may be interested at the follow up visit in installing the Viaziz Scam nirali.  I reviewed the use/care rae with him and he practiced insertion and removal of the hearing aids.  The hearing aid purchase agreement was signed and a copy given to Mr. Colmenares and he paid the deposit and the balance on the hearing aids.  The 2 week hearing aid follow up was scheduled and he will contact me prior to that appointment with any questions or concerns.    9/23/2024    Hearing Aid Information:    Right ear  : Phonak  Model:  Audeo L70-R  Type:  DILCIA  Color: P8 velvet black  Battery: Rechargeable  Tube/ length & power: #3 M  Dome size & style:  med vented  Serial number: [id number]  Warranty expiration: 9/25/27  L and D expiration:  9/25/27     Left ear  :  Phonak  Model:  Audeo L70-R  Type:  DILCIA  Color:  P8 velvet black  Battery: Rechargeable  Tube/ length & power: #3 M    Dome size & style:  med vented  Serial number: [id number]  Warranty expiration: 9/25/27  L and D expiration:  9/25/27

## 2024-10-07 ENCOUNTER — CLINICAL SUPPORT (OUTPATIENT)
Dept: OTOLARYNGOLOGY | Facility: CLINIC | Age: 69
End: 2024-10-07
Payer: MEDICARE

## 2024-10-07 DIAGNOSIS — H90.3 SENSORINEURAL HEARING LOSS, BILATERAL: Primary | ICD-10-CM

## 2024-10-07 PROCEDURE — 99499 UNLISTED E&M SERVICE: CPT | Mod: S$GLB,,, | Performed by: AUDIOLOGIST

## 2024-10-07 NOTE — PROGRESS NOTES
Darrian Velez was seen today in the clinic for a hearing aid follow up.  He reported doing very well with the hearing aids and has experienced good benefit with the hearing aids.  He is still not interested in adding the nirali to his phone.  He will contact me as needed or annually.    Hearing Aid Information:     Right ear  : Phonak  Model:  Audeo L70-R  Type:  DILCIA  Color: P8 velvet black  Battery: Rechargeable  Tube/ length & power: #3 M  Dome size & style:  med vented  Serial number: [id number]  Warranty expiration: 9/25/27  L and D expiration:  9/25/27     Left ear  :  Phonak  Model:  Audeo L70-R  Type:  DILCIA  Color:  P8 velvet black  Battery: Rechargeable  Tube/ length & power: #3 M    Dome size & style:  med vented  Serial number: [id number]  Warranty expiration: 9/25/27  L and D expiration:  9/25/27

## 2024-10-14 ENCOUNTER — OFFICE VISIT (OUTPATIENT)
Dept: DERMATOLOGY | Facility: CLINIC | Age: 69
End: 2024-10-14
Payer: MEDICARE

## 2024-10-14 DIAGNOSIS — D18.01 CHERRY ANGIOMA: ICD-10-CM

## 2024-10-14 DIAGNOSIS — Z85.828 HISTORY OF NONMELANOMA SKIN CANCER: Primary | ICD-10-CM

## 2024-10-14 DIAGNOSIS — L82.1 SEBORRHEIC KERATOSES: ICD-10-CM

## 2024-10-14 DIAGNOSIS — L72.0 EPIDERMAL INCLUSION CYST: ICD-10-CM

## 2024-10-14 DIAGNOSIS — D22.9 MULTIPLE BENIGN NEVI: ICD-10-CM

## 2024-10-14 PROCEDURE — 99213 OFFICE O/P EST LOW 20 MIN: CPT | Mod: S$GLB,,, | Performed by: STUDENT IN AN ORGANIZED HEALTH CARE EDUCATION/TRAINING PROGRAM

## 2024-10-14 PROCEDURE — 1160F RVW MEDS BY RX/DR IN RCRD: CPT | Mod: CPTII,S$GLB,, | Performed by: STUDENT IN AN ORGANIZED HEALTH CARE EDUCATION/TRAINING PROGRAM

## 2024-10-14 PROCEDURE — 1126F AMNT PAIN NOTED NONE PRSNT: CPT | Mod: CPTII,S$GLB,, | Performed by: STUDENT IN AN ORGANIZED HEALTH CARE EDUCATION/TRAINING PROGRAM

## 2024-10-14 PROCEDURE — 1101F PT FALLS ASSESS-DOCD LE1/YR: CPT | Mod: CPTII,S$GLB,, | Performed by: STUDENT IN AN ORGANIZED HEALTH CARE EDUCATION/TRAINING PROGRAM

## 2024-10-14 PROCEDURE — 3044F HG A1C LEVEL LT 7.0%: CPT | Mod: CPTII,S$GLB,, | Performed by: STUDENT IN AN ORGANIZED HEALTH CARE EDUCATION/TRAINING PROGRAM

## 2024-10-14 PROCEDURE — 1159F MED LIST DOCD IN RCRD: CPT | Mod: CPTII,S$GLB,, | Performed by: STUDENT IN AN ORGANIZED HEALTH CARE EDUCATION/TRAINING PROGRAM

## 2024-10-14 PROCEDURE — 99999 PR PBB SHADOW E&M-EST. PATIENT-LVL II: CPT | Mod: PBBFAC,,, | Performed by: STUDENT IN AN ORGANIZED HEALTH CARE EDUCATION/TRAINING PROGRAM

## 2024-10-14 PROCEDURE — 3288F FALL RISK ASSESSMENT DOCD: CPT | Mod: CPTII,S$GLB,, | Performed by: STUDENT IN AN ORGANIZED HEALTH CARE EDUCATION/TRAINING PROGRAM

## 2024-10-14 NOTE — PROGRESS NOTES
Subjective:      Patient ID:  Darrian Vleez is a 69 y.o. male who presents for   Chief Complaint   Patient presents with    Skin Check     UBSE     Darrian Velez is a 69 y.o. male who presents for:     Last office visit on 7/24/24 with me for lesions and bx:  1. Skin,  Right volar forearm,  shave biopsy:    - SQUAMOUS CELL CARCINOMA IN SITU    - The tumor involves the peripheral tissue edge and focally involves the deep tissue edge.     Lesion treated with efudex BID x 2-3 mos (pt still currently using)    The patient has the following lesions of concern:  Location: new spot popping up on L elbow  Duration: years changing over a few months  Symptoms: seems to be getting bigger  Relieving factors/Previous treatments: none    Pertinent history:  History of blistering sunburns: No  History of tanning bed use: No  Family history of melanoma: No  Personal history of mole removal: Yes  Personal history of skin cancer: Yes - SCCis of right volar forearm s/p efudex x 2-3 months (7-2024 biopsied)        Review of Systems   Skin:  Positive for activity-related sunscreen use. Negative for daily sunscreen use and recent sunburn.   Hematologic/Lymphatic: Does not bruise/bleed easily.       Objective:   Physical Exam   Constitutional: He appears well-developed and well-nourished. No distress.   Neurological: He is alert and oriented to person, place, and time. He is not disoriented.   Psychiatric: He has a normal mood and affect.   Skin:   Areas Examined (abnormalities noted in diagram):   Scalp / Hair Palpated and Inspected  Head / Face Inspection Performed  Neck Inspection Performed  Chest / Axilla Inspection Performed  Abdomen Inspection Performed  Back Inspection Performed  RUE Inspected  LUE Inspection Performed                 Diagram Legend     Erythematous scaling macule/papule c/w actinic keratosis       Vascular papule c/w angioma      Pigmented verrucoid papule/plaque c/w seborrheic keratosis      Yellow  umbilicated papule c/w sebaceous hyperplasia      Irregularly shaped tan macule c/w lentigo     1-2 mm smooth white papules consistent with Milia      Movable subcutaneous cyst with punctum c/w epidermal inclusion cyst      Subcutaneous movable cyst c/w pilar cyst      Firm pink to brown papule c/w dermatofibroma      Pedunculated fleshy papule(s) c/w skin tag(s)      Evenly pigmented macule c/w junctional nevus     Mildly variegated pigmented, slightly irregular-bordered macule c/w mildly atypical nevus      Flesh colored to evenly pigmented papule c/w intradermal nevus       Pink pearly papule/plaque c/w basal cell carcinoma      Erythematous hyperkeratotic cursted plaque c/w SCC      Surgical scar with no sign of skin cancer recurrence      Open and closed comedones      Inflammatory papules and pustules      Verrucoid papule consistent consistent with wart     Erythematous eczematous patches and plaques     Dystrophic onycholytic nail with subungual debris c/w onychomycosis     Umbilicated papule    Erythematous-base heme-crusted tan verrucoid plaque consistent with inflamed seborrheic keratosis     Erythematous Silvery Scaling Plaque c/w Psoriasis     See annotation      Assessment / Plan:      Seborrheic keratoses  These are benign inherited growths without a malignant potential. Reassurance given to patient. No treatment is necessary.    Reassurance given to patient. No treatment is necessary.   Treatment of benign, asymptomatic lesions may be considered cosmetic.    Epidermal inclusion cyst  Reassurance given to patient. No treatment is necessary.   Discussed treatment options - excision vs observation. Cysts may recur with excision. Pt will defer treatment at this time.      Cherry angioma  This is a benign vascular lesion. Reassurance given. No treatment required.      Multiple benign nevi  Reassurance given to patient. No treatment is necessary.   Treatment of benign, asymptomatic lesions may be  considered cosmetic.     History of nonmelanoma skin cancer  Area(s) of previous NMSC evaluated with no signs of recurrence.    Upper body skin examination performed today including at least 6 points as noted in physical examination. No lesions suspicious for malignancy noted.    Recommend daily sun protection/avoidance and use of at least SPF 30, broad spectrum sunscreen (OTC drug).      Wear hat always    RTC 6 mos, sooner prn

## 2024-11-21 ENCOUNTER — TELEPHONE (OUTPATIENT)
Dept: OTOLARYNGOLOGY | Facility: CLINIC | Age: 69
End: 2024-11-21
Payer: MEDICARE

## 2024-11-21 NOTE — TELEPHONE ENCOUNTER
----- Message from Nurse Dexter sent at 11/20/2024  1:14 PM CST -----  Contact: pt  Please advise  ----- Message -----  From: David Loera  Sent: 11/20/2024  12:22 PM CST  To: Flora Velazquez Staff    Type:  Sooner Apoointment Request    Caller is requesting a sooner appointment.  Caller declined first available appointment listed below.  Caller will not accept being placed on the waitlist and is requesting a message be sent to doctor.  Name of Caller:pt   When is the first available appointment? N/a  Symptoms:hearing aid issues   Would the patient rather a call back or a response via [facility]? call  Best Call Back Number:[phone]   Additional Information:

## 2024-11-25 ENCOUNTER — CLINICAL SUPPORT (OUTPATIENT)
Dept: OTOLARYNGOLOGY | Facility: CLINIC | Age: 69
End: 2024-11-25
Payer: MEDICARE

## 2024-11-25 DIAGNOSIS — H90.3 SENSORINEURAL HEARING LOSS, BILATERAL: Primary | ICD-10-CM

## 2024-11-25 PROCEDURE — 99499 UNLISTED E&M SERVICE: CPT | Mod: S$GLB,,, | Performed by: AUDIOLOGIST

## 2024-11-25 NOTE — PROGRESS NOTES
Darrian Velez was seen today in the clinic for a hearing aid check.  He reported that he is not hearing out of either hearing aid and that on a recent cruise the background noise was too loud and he couldn't understand speech.  The listening check confirmed that both hearing aids are dead.  I changed both wax guards and domes and the listening check revealed good sound quality in both hearing aids.  I increased the overall gain and made adjustments to speech in noise program and increased the noise block.  He will try at these settings and contact me with any further issues.    Hearing Aid Information:     Right ear  : Phonak  Model:  Audeo L70-R  Type:  DILCIA  Color: P8 velvet black  Battery: Rechargeable  Tube/ length & power: #3 M  Dome size & style:  med vented  Serial number: [id number]  Warranty expiration: 9/25/27  L and D expiration:  9/25/27     Left ear  :  Phonak  Model:  Audeo L70-R  Type:  DILCIA  Color:  P8 velvet black  Battery: Rechargeable  Tube/ length & power: #3 M    Dome size & style:  med vented  Serial number: [id number]  Warranty expiration: 9/25/27  L and D expiration:  9/25/27

## 2024-12-03 ENCOUNTER — LAB VISIT (OUTPATIENT)
Dept: LAB | Facility: HOSPITAL | Age: 69
End: 2024-12-03
Attending: FAMILY MEDICINE
Payer: MEDICARE

## 2024-12-03 DIAGNOSIS — R73.03 PREDIABETES: ICD-10-CM

## 2024-12-03 DIAGNOSIS — E66.01 SEVERE OBESITY (BMI >= 40): ICD-10-CM

## 2024-12-03 DIAGNOSIS — I10 PRIMARY HYPERTENSION: ICD-10-CM

## 2024-12-03 LAB
ALBUMIN SERPL BCP-MCNC: 4.3 G/DL (ref 3.5–5.2)
ALP SERPL-CCNC: 88 U/L (ref 40–150)
ALT SERPL W/O P-5'-P-CCNC: 40 U/L (ref 10–44)
ANION GAP SERPL CALC-SCNC: 11 MMOL/L (ref 8–16)
AST SERPL-CCNC: 34 U/L (ref 10–40)
BASOPHILS # BLD AUTO: 0.07 K/UL (ref 0–0.2)
BASOPHILS NFR BLD: 1.1 % (ref 0–1.9)
BILIRUB SERPL-MCNC: 0.4 MG/DL (ref 0.1–1)
BUN SERPL-MCNC: 8 MG/DL (ref 8–23)
CALCIUM SERPL-MCNC: 9.3 MG/DL (ref 8.7–10.5)
CHLORIDE SERPL-SCNC: 107 MMOL/L (ref 95–110)
CHOLEST SERPL-MCNC: 147 MG/DL (ref 120–199)
CHOLEST/HDLC SERPL: 3.5 {RATIO} (ref 2–5)
CO2 SERPL-SCNC: 22 MMOL/L (ref 23–29)
CREAT SERPL-MCNC: 1.1 MG/DL (ref 0.5–1.4)
DIFFERENTIAL METHOD BLD: ABNORMAL
EOSINOPHIL # BLD AUTO: 0.1 K/UL (ref 0–0.5)
EOSINOPHIL NFR BLD: 1.4 % (ref 0–8)
ERYTHROCYTE [DISTWIDTH] IN BLOOD BY AUTOMATED COUNT: 13 % (ref 11.5–14.5)
EST. GFR  (NO RACE VARIABLE): >60 ML/MIN/1.73 M^2
ESTIMATED AVG GLUCOSE: 117 MG/DL (ref 68–131)
GLUCOSE SERPL-MCNC: 138 MG/DL (ref 70–110)
HBA1C MFR BLD: 5.7 % (ref 4–5.6)
HCT VFR BLD AUTO: 47.8 % (ref 40–54)
HDLC SERPL-MCNC: 42 MG/DL (ref 40–75)
HDLC SERPL: 28.6 % (ref 20–50)
HGB BLD-MCNC: 16.2 G/DL (ref 14–18)
IMM GRANULOCYTES # BLD AUTO: 0.03 K/UL (ref 0–0.04)
IMM GRANULOCYTES NFR BLD AUTO: 0.5 % (ref 0–0.5)
LDLC SERPL CALC-MCNC: 74 MG/DL (ref 63–159)
LYMPHOCYTES # BLD AUTO: 1.8 K/UL (ref 1–4.8)
LYMPHOCYTES NFR BLD: 28.2 % (ref 18–48)
MCH RBC QN AUTO: 31.2 PG (ref 27–31)
MCHC RBC AUTO-ENTMCNC: 33.9 G/DL (ref 32–36)
MCV RBC AUTO: 92 FL (ref 82–98)
MONOCYTES # BLD AUTO: 1 K/UL (ref 0.3–1)
MONOCYTES NFR BLD: 15.1 % (ref 4–15)
NEUTROPHILS # BLD AUTO: 3.4 K/UL (ref 1.8–7.7)
NEUTROPHILS NFR BLD: 53.7 % (ref 38–73)
NONHDLC SERPL-MCNC: 105 MG/DL
NRBC BLD-RTO: 0 /100 WBC
PLATELET # BLD AUTO: 237 K/UL (ref 150–450)
PMV BLD AUTO: 11.8 FL (ref 9.2–12.9)
POTASSIUM SERPL-SCNC: 4 MMOL/L (ref 3.5–5.1)
PROT SERPL-MCNC: 7.6 G/DL (ref 6–8.4)
RBC # BLD AUTO: 5.2 M/UL (ref 4.6–6.2)
SODIUM SERPL-SCNC: 140 MMOL/L (ref 136–145)
TRIGL SERPL-MCNC: 155 MG/DL (ref 30–150)
TSH SERPL DL<=0.005 MIU/L-ACNC: 3.83 UIU/ML (ref 0.4–4)
WBC # BLD AUTO: 6.31 K/UL (ref 3.9–12.7)

## 2024-12-03 PROCEDURE — 84443 ASSAY THYROID STIM HORMONE: CPT | Performed by: FAMILY MEDICINE

## 2024-12-03 PROCEDURE — 80053 COMPREHEN METABOLIC PANEL: CPT | Performed by: FAMILY MEDICINE

## 2024-12-03 PROCEDURE — 83036 HEMOGLOBIN GLYCOSYLATED A1C: CPT | Performed by: FAMILY MEDICINE

## 2024-12-03 PROCEDURE — 80061 LIPID PANEL: CPT | Performed by: FAMILY MEDICINE

## 2024-12-03 PROCEDURE — 85025 COMPLETE CBC W/AUTO DIFF WBC: CPT | Performed by: FAMILY MEDICINE

## 2024-12-03 PROCEDURE — 36415 COLL VENOUS BLD VENIPUNCTURE: CPT | Mod: PO | Performed by: FAMILY MEDICINE

## 2024-12-11 ENCOUNTER — OFFICE VISIT (OUTPATIENT)
Dept: FAMILY MEDICINE | Facility: CLINIC | Age: 69
End: 2024-12-11
Payer: MEDICARE

## 2024-12-11 VITALS
SYSTOLIC BLOOD PRESSURE: 126 MMHG | WEIGHT: 251 LBS | DIASTOLIC BLOOD PRESSURE: 80 MMHG | HEART RATE: 80 BPM | BODY MASS INDEX: 39.39 KG/M2 | OXYGEN SATURATION: 97 % | HEIGHT: 67 IN

## 2024-12-11 DIAGNOSIS — E66.812 CLASS 2 OBESITY WITH BODY MASS INDEX (BMI) OF 39.0 TO 39.9 IN ADULT, UNSPECIFIED OBESITY TYPE, UNSPECIFIED WHETHER SERIOUS COMORBIDITY PRESENT: ICD-10-CM

## 2024-12-11 DIAGNOSIS — Z12.5 SCREENING FOR PROSTATE CANCER: ICD-10-CM

## 2024-12-11 DIAGNOSIS — I10 PRIMARY HYPERTENSION: Primary | ICD-10-CM

## 2024-12-11 DIAGNOSIS — E78.5 DYSLIPIDEMIA: ICD-10-CM

## 2024-12-11 DIAGNOSIS — I10 ESSENTIAL HYPERTENSION: ICD-10-CM

## 2024-12-11 DIAGNOSIS — E87.6 HYPOKALEMIA: ICD-10-CM

## 2024-12-11 DIAGNOSIS — R73.03 PREDIABETES: ICD-10-CM

## 2024-12-11 PROCEDURE — 3008F BODY MASS INDEX DOCD: CPT | Mod: CPTII,S$GLB,, | Performed by: FAMILY MEDICINE

## 2024-12-11 PROCEDURE — 1159F MED LIST DOCD IN RCRD: CPT | Mod: CPTII,S$GLB,, | Performed by: FAMILY MEDICINE

## 2024-12-11 PROCEDURE — 1160F RVW MEDS BY RX/DR IN RCRD: CPT | Mod: CPTII,S$GLB,, | Performed by: FAMILY MEDICINE

## 2024-12-11 PROCEDURE — 3288F FALL RISK ASSESSMENT DOCD: CPT | Mod: CPTII,S$GLB,, | Performed by: FAMILY MEDICINE

## 2024-12-11 PROCEDURE — 3044F HG A1C LEVEL LT 7.0%: CPT | Mod: CPTII,S$GLB,, | Performed by: FAMILY MEDICINE

## 2024-12-11 PROCEDURE — 1126F AMNT PAIN NOTED NONE PRSNT: CPT | Mod: CPTII,S$GLB,, | Performed by: FAMILY MEDICINE

## 2024-12-11 PROCEDURE — 3074F SYST BP LT 130 MM HG: CPT | Mod: CPTII,S$GLB,, | Performed by: FAMILY MEDICINE

## 2024-12-11 PROCEDURE — 99999 PR PBB SHADOW E&M-EST. PATIENT-LVL III: CPT | Mod: PBBFAC,,, | Performed by: FAMILY MEDICINE

## 2024-12-11 PROCEDURE — 1101F PT FALLS ASSESS-DOCD LE1/YR: CPT | Mod: CPTII,S$GLB,, | Performed by: FAMILY MEDICINE

## 2024-12-11 PROCEDURE — 99215 OFFICE O/P EST HI 40 MIN: CPT | Mod: S$GLB,,, | Performed by: FAMILY MEDICINE

## 2024-12-11 PROCEDURE — 3079F DIAST BP 80-89 MM HG: CPT | Mod: CPTII,S$GLB,, | Performed by: FAMILY MEDICINE

## 2024-12-11 RX ORDER — LOSARTAN POTASSIUM AND HYDROCHLOROTHIAZIDE 12.5; 1 MG/1; MG/1
1 TABLET ORAL DAILY
Qty: 90 TABLET | Refills: 3 | Status: SHIPPED | OUTPATIENT
Start: 2024-12-11

## 2024-12-11 RX ORDER — POTASSIUM CHLORIDE 20 MEQ/1
20 TABLET, EXTENDED RELEASE ORAL DAILY
Qty: 90 TABLET | Refills: 3 | Status: SHIPPED | OUTPATIENT
Start: 2024-12-11

## 2024-12-11 RX ORDER — ATORVASTATIN CALCIUM 10 MG/1
10 TABLET, FILM COATED ORAL NIGHTLY
Qty: 90 TABLET | Refills: 3 | Status: SHIPPED | OUTPATIENT
Start: 2024-12-11

## 2024-12-11 RX ORDER — FENOFIBRATE 134 MG/1
134 CAPSULE ORAL
Qty: 90 CAPSULE | Refills: 3 | Status: SHIPPED | OUTPATIENT
Start: 2024-12-11

## 2024-12-11 NOTE — PROGRESS NOTES
Subjective     Patient ID: Darrian Velez is a 69 y.o. male.    Chief Complaint: No chief complaint on file.    69 years old male came to the clinic for blood pressure check.  Blood pressure today was stable.  Patient with a BMI of 39 currently trying to lose weight.  Patient with elevated triglycerides but better than before.    Hyperlipidemia  This is a chronic problem. The problem is uncontrolled. Recent lipid tests were reviewed and are high. Exacerbating diseases include obesity. Factors aggravating his hyperlipidemia include thiazides. Pertinent negatives include no chest pain or myalgias. Current antihyperlipidemic treatment includes statins. The current treatment provides moderate improvement of lipids. There are no compliance problems.  Risk factors for coronary artery disease include hypertension and male sex.   Hypertension  This is a chronic problem. The current episode started more than 1 year ago. The problem is unchanged. The problem is controlled. Pertinent negatives include no chest pain, orthopnea, palpitations or peripheral edema. Risk factors for coronary artery disease include male gender and obesity. Past treatments include angiotensin blockers and diuretics. The current treatment provides significant improvement. There is no history of angina. There is no history of a hypertension causing med.     Review of Systems   Constitutional: Negative.    HENT: Negative.     Eyes: Negative.    Respiratory: Negative.     Cardiovascular: Negative.  Negative for chest pain, palpitations, orthopnea, leg swelling and claudication.   Gastrointestinal: Negative.    Genitourinary: Negative.    Musculoskeletal: Negative.  Negative for myalgias.   Integumentary:  Negative.   Neurological: Negative.    Psychiatric/Behavioral: Negative.            Objective     Physical Exam  Vitals and nursing note reviewed.   Constitutional:       General: He is not in acute distress.     Appearance: He is well-developed. He  is not diaphoretic.   HENT:      Head: Normocephalic and atraumatic.      Right Ear: External ear normal.      Left Ear: External ear normal.      Nose: Nose normal.      Mouth/Throat:      Pharynx: No oropharyngeal exudate.   Eyes:      General: No scleral icterus.        Right eye: No discharge.         Left eye: No discharge.      Conjunctiva/sclera: Conjunctivae normal.      Pupils: Pupils are equal, round, and reactive to light.   Neck:      Thyroid: No thyromegaly.      Vascular: No JVD.      Trachea: No tracheal deviation.   Cardiovascular:      Rate and Rhythm: Normal rate and regular rhythm.      Heart sounds: Normal heart sounds. No murmur heard.     No friction rub. No gallop.   Pulmonary:      Effort: Pulmonary effort is normal. No respiratory distress.      Breath sounds: Normal breath sounds. No stridor. No wheezing or rales.   Chest:      Chest wall: No tenderness.   Abdominal:      General: Bowel sounds are normal. There is no distension.      Palpations: Abdomen is soft. There is no mass.      Tenderness: There is no abdominal tenderness. There is no guarding or rebound.   Musculoskeletal:         General: No tenderness. Normal range of motion.      Cervical back: Normal range of motion and neck supple.   Lymphadenopathy:      Cervical: No cervical adenopathy.   Skin:     General: Skin is warm and dry.      Coloration: Skin is not pale.      Findings: No erythema or rash.   Neurological:      Mental Status: He is alert and oriented to person, place, and time.      Cranial Nerves: No cranial nerve deficit.      Motor: No abnormal muscle tone.      Coordination: Coordination normal.      Deep Tendon Reflexes: Reflexes are normal and symmetric. Reflexes normal.   Psychiatric:         Behavior: Behavior normal.         Thought Content: Thought content normal.         Judgment: Judgment normal.            Assessment and Plan     1. Primary hypertension  -     Urinalysis; Future  -     Comprehensive  Metabolic Panel; Future; Expected date: 12/11/2024  -     Lipid Panel; Future; Expected date: 12/11/2024  -     TSH; Future; Expected date: 12/11/2024  -     CBC Auto Differential; Future; Expected date: 12/11/2024    2. Class 2 obesity with body mass index (BMI) of 39.0 to 39.9 in adult, unspecified obesity type, unspecified whether serious comorbidity present  -     Comprehensive Metabolic Panel; Future; Expected date: 12/11/2024  -     Lipid Panel; Future; Expected date: 12/11/2024    3. Prediabetes  -     Hemoglobin A1C; Future; Expected date: 12/11/2024    4. Screening for prostate cancer  -     PSA, Screening; Future; Expected date: 12/11/2024    5. Essential hypertension  -     losartan-hydrochlorothiazide 100-12.5 mg (HYZAAR) 100-12.5 mg Tab; Take 1 tablet by mouth once daily.  Dispense: 90 tablet; Refill: 3    6. Dyslipidemia  -     atorvastatin (LIPITOR) 10 MG tablet; Take 1 tablet (10 mg total) by mouth every evening.  Dispense: 90 tablet; Refill: 3  -     fenofibrate micronized (LOFIBRA) 134 MG Cap; Take 1 capsule (134 mg total) by mouth daily with breakfast.  Dispense: 90 capsule; Refill: 3    7. Hypokalemia  -     potassium chloride SA (K-DUR,KLOR-CON) 20 MEQ tablet; Take 1 tablet (20 mEq total) by mouth once daily.  Dispense: 90 tablet; Refill: 3    Continue monitoring blood pressure at home, low sodium diet.   I spent a total of 42 minutes on the day of the visit.This includes face to face time and non-face to face time preparing to see the patient (eg, review of tests), obtaining and/or reviewing separately obtained history, documenting clinical information in the electronic or other health record, independently interpreting results and communicating results to the patient/family/caregiver, or care coordinator.            Follow up in about 6 months (around 6/11/2025), or if symptoms worsen or fail to improve.

## 2025-01-30 DIAGNOSIS — Z00.00 ENCOUNTER FOR MEDICARE ANNUAL WELLNESS EXAM: ICD-10-CM

## 2025-02-05 ENCOUNTER — TELEPHONE (OUTPATIENT)
Dept: OTOLARYNGOLOGY | Facility: CLINIC | Age: 70
End: 2025-02-05
Payer: MEDICARE

## 2025-02-05 ENCOUNTER — DOCUMENTATION ONLY (OUTPATIENT)
Dept: OTOLARYNGOLOGY | Facility: CLINIC | Age: 70
End: 2025-02-05
Payer: MEDICARE

## 2025-02-05 NOTE — TELEPHONE ENCOUNTER
Spoke with patient.  His left hearing aid is charging but no sound is coming out.  He will drop it off today for me to take a look at it.    ----- Message from Stas sent at 2/5/2025  9:13 AM CST -----  Name Of Caller:  Darrian        Provider Name:Caroline Hines        Does patient feel the need to be seen today? no        Relationship to the Pt?: patient        Contact Preference?: [phone]        What is the nature of the call?:Patient states that he would like to speak with someone in the office in regards to getting an appointment scheduled, he states that his hearing aid is not working.

## 2025-02-05 NOTE — PROGRESS NOTES
Mr. Colmenares dropped off his left hearing aid with the complaint that it is charging but no sound is coming out.  The listening check confirmed the hearing aid was dead.  I cleaned the hearing aid and changed the wax guard and dome and the listening check now reveals good sound quality.  I contacted Mr. Colmenares and he will  the hearing aid at the .

## 2025-04-15 ENCOUNTER — OFFICE VISIT (OUTPATIENT)
Dept: DERMATOLOGY | Facility: CLINIC | Age: 70
End: 2025-04-15
Payer: MEDICARE

## 2025-04-15 DIAGNOSIS — L82.1 SK (SEBORRHEIC KERATOSIS): Primary | ICD-10-CM

## 2025-04-15 DIAGNOSIS — D22.9 MULTIPLE BENIGN NEVI: ICD-10-CM

## 2025-04-15 DIAGNOSIS — Z85.828 HISTORY OF NONMELANOMA SKIN CANCER: ICD-10-CM

## 2025-04-15 DIAGNOSIS — L91.8 SKIN TAG: ICD-10-CM

## 2025-04-15 DIAGNOSIS — L82.0 LICHENOID KERATOSIS: ICD-10-CM

## 2025-04-15 PROCEDURE — 1101F PT FALLS ASSESS-DOCD LE1/YR: CPT | Mod: CPTII,S$GLB,, | Performed by: STUDENT IN AN ORGANIZED HEALTH CARE EDUCATION/TRAINING PROGRAM

## 2025-04-15 PROCEDURE — 1126F AMNT PAIN NOTED NONE PRSNT: CPT | Mod: CPTII,S$GLB,, | Performed by: STUDENT IN AN ORGANIZED HEALTH CARE EDUCATION/TRAINING PROGRAM

## 2025-04-15 PROCEDURE — 1160F RVW MEDS BY RX/DR IN RCRD: CPT | Mod: CPTII,S$GLB,, | Performed by: STUDENT IN AN ORGANIZED HEALTH CARE EDUCATION/TRAINING PROGRAM

## 2025-04-15 PROCEDURE — 3288F FALL RISK ASSESSMENT DOCD: CPT | Mod: CPTII,S$GLB,, | Performed by: STUDENT IN AN ORGANIZED HEALTH CARE EDUCATION/TRAINING PROGRAM

## 2025-04-15 PROCEDURE — 1159F MED LIST DOCD IN RCRD: CPT | Mod: CPTII,S$GLB,, | Performed by: STUDENT IN AN ORGANIZED HEALTH CARE EDUCATION/TRAINING PROGRAM

## 2025-04-15 PROCEDURE — 99213 OFFICE O/P EST LOW 20 MIN: CPT | Mod: S$GLB,,, | Performed by: STUDENT IN AN ORGANIZED HEALTH CARE EDUCATION/TRAINING PROGRAM

## 2025-04-15 PROCEDURE — 99999 PR PBB SHADOW E&M-EST. PATIENT-LVL II: CPT | Mod: PBBFAC,,, | Performed by: STUDENT IN AN ORGANIZED HEALTH CARE EDUCATION/TRAINING PROGRAM

## 2025-04-15 NOTE — PROGRESS NOTES
Subjective:      Patient ID:  Darrian Velez is a 70 y.o. male who presents for No chief complaint on file.    Darrian Velez is a 70 y.o. male who presents for: UBSE screening exam for skin cancer.    Last office visit 10/14/2024 with me     The patient has the following lesions of concern:  Location: left arm   Duration: months   Symptoms: none   Relieving factors/Previous treatments: none     Pertinent history:  History of blistering sunburns: No  History of tanning bed use: No  Family history of melanoma: No  Personal history of mole removal: Yes  Personal history of skin cancer: Yes - SCCis of right volar forearm s/p efudex x 2-3 months (7-2024 biopsied)      Review of Systems   Skin:  Positive for activity-related sunscreen use and wears hat. Negative for daily sunscreen use and recent sunburn.   Hematologic/Lymphatic: Does not bruise/bleed easily.       Objective:   Physical Exam   Constitutional: He appears well-developed and well-nourished. No distress.   Neurological: He is alert and oriented to person, place, and time. He is not disoriented.   Psychiatric: He has a normal mood and affect.   Skin:   Areas Examined (abnormalities noted in diagram):   Scalp / Hair Palpated and Inspected  Head / Face Inspection Performed  Neck Inspection Performed  Chest / Axilla Inspection Performed  Abdomen Inspection Performed  Back Inspection Performed  RUE Inspected  LUE Inspection Performed                 Diagram Legend     Erythematous scaling macule/papule c/w actinic keratosis       Vascular papule c/w angioma      Pigmented verrucoid papule/plaque c/w seborrheic keratosis      Yellow umbilicated papule c/w sebaceous hyperplasia      Irregularly shaped tan macule c/w lentigo     1-2 mm smooth white papules consistent with Milia      Movable subcutaneous cyst with punctum c/w epidermal inclusion cyst      Subcutaneous movable cyst c/w pilar cyst      Firm pink to brown papule c/w dermatofibroma       Pedunculated fleshy papule(s) c/w skin tag(s)      Evenly pigmented macule c/w junctional nevus     Mildly variegated pigmented, slightly irregular-bordered macule c/w mildly atypical nevus      Flesh colored to evenly pigmented papule c/w intradermal nevus       Pink pearly papule/plaque c/w basal cell carcinoma      Erythematous hyperkeratotic cursted plaque c/w SCC      Surgical scar with no sign of skin cancer recurrence      Open and closed comedones      Inflammatory papules and pustules      Verrucoid papule consistent consistent with wart     Erythematous eczematous patches and plaques     Dystrophic onycholytic nail with subungual debris c/w onychomycosis     Umbilicated papule    Erythematous-base heme-crusted tan verrucoid plaque consistent with inflamed seborrheic keratosis     Erythematous Silvery Scaling Plaque c/w Psoriasis     See annotation      Assessment / Plan:        SK (seborrheic keratosis)  These are benign inherited growths without a malignant potential. Reassurance given to patient. No treatment is necessary.      Skin tag  This is a benign lesion. No further treatment is necessary.     Multiple benign nevi  This is a benign lesion. No further treatment is necessary.     Lichenoid keratosis  This is a benign lesion. No further treatment is necessary.     History of nonmelanoma skin cancer  Area(s) of previous NMSC evaluated with no signs of recurrence.    Upper body skin examination performed today including at least 6 points as noted in physical examination. No lesions suspicious for malignancy noted.    Recommend daily sun protection/avoidance and use of at least SPF 30, broad spectrum sunscreen (OTC drug).      RTC 1 year, sooner prn

## 2025-04-22 ENCOUNTER — RESULTS FOLLOW-UP (OUTPATIENT)
Dept: FAMILY MEDICINE | Facility: CLINIC | Age: 70
End: 2025-04-22

## 2025-04-22 ENCOUNTER — LAB VISIT (OUTPATIENT)
Dept: LAB | Facility: HOSPITAL | Age: 70
End: 2025-04-22
Payer: MEDICARE

## 2025-04-22 ENCOUNTER — OFFICE VISIT (OUTPATIENT)
Dept: FAMILY MEDICINE | Facility: CLINIC | Age: 70
End: 2025-04-22
Payer: MEDICARE

## 2025-04-22 ENCOUNTER — HOSPITAL ENCOUNTER (OUTPATIENT)
Dept: RADIOLOGY | Facility: HOSPITAL | Age: 70
Discharge: HOME | End: 2025-04-22
Payer: MEDICARE

## 2025-04-22 VITALS
OXYGEN SATURATION: 99 % | SYSTOLIC BLOOD PRESSURE: 146 MMHG | WEIGHT: 254.44 LBS | BODY MASS INDEX: 39.93 KG/M2 | DIASTOLIC BLOOD PRESSURE: 68 MMHG | HEART RATE: 77 BPM | HEIGHT: 67 IN

## 2025-04-22 DIAGNOSIS — R10.11 RIGHT UPPER QUADRANT ABDOMINAL PAIN: ICD-10-CM

## 2025-04-22 DIAGNOSIS — R10.11 RIGHT UPPER QUADRANT ABDOMINAL PAIN: Primary | ICD-10-CM

## 2025-04-22 DIAGNOSIS — K76.0 FATTY LIVER: Primary | ICD-10-CM

## 2025-04-22 LAB
ABSOLUTE EOSINOPHIL (OHS): 0.11 K/UL
ABSOLUTE MONOCYTE (OHS): 0.72 K/UL (ref 0.3–1)
ABSOLUTE NEUTROPHIL COUNT (OHS): 3 K/UL (ref 1.8–7.7)
ALBUMIN SERPL BCP-MCNC: 4.2 G/DL (ref 3.5–5.2)
ALP SERPL-CCNC: 74 UNIT/L (ref 40–150)
ALT SERPL W/O P-5'-P-CCNC: 26 UNIT/L (ref 10–44)
AMYLASE SERPL-CCNC: 60 UNIT/L (ref 20–110)
ANION GAP (OHS): 10 MMOL/L (ref 8–16)
AST SERPL-CCNC: 23 UNIT/L (ref 11–45)
BASOPHILS # BLD AUTO: 0.03 K/UL
BASOPHILS NFR BLD AUTO: 0.5 %
BILIRUB SERPL-MCNC: 0.4 MG/DL (ref 0.1–1)
BUN SERPL-MCNC: 15 MG/DL (ref 8–23)
CALCIUM SERPL-MCNC: 9.4 MG/DL (ref 8.7–10.5)
CHLORIDE SERPL-SCNC: 107 MMOL/L (ref 95–110)
CO2 SERPL-SCNC: 22 MMOL/L (ref 23–29)
CREAT SERPL-MCNC: 1 MG/DL (ref 0.5–1.4)
ERYTHROCYTE [DISTWIDTH] IN BLOOD BY AUTOMATED COUNT: 13.2 % (ref 11.5–14.5)
GFR SERPLBLD CREATININE-BSD FMLA CKD-EPI: >60 ML/MIN/1.73/M2
GLUCOSE SERPL-MCNC: 123 MG/DL (ref 70–110)
HCT VFR BLD AUTO: 48.3 % (ref 40–54)
HGB BLD-MCNC: 16 GM/DL (ref 14–18)
IMM GRANULOCYTES # BLD AUTO: 0.02 K/UL (ref 0–0.04)
IMM GRANULOCYTES NFR BLD AUTO: 0.4 % (ref 0–0.5)
LIPASE SERPL-CCNC: 19 U/L (ref 4–60)
LYMPHOCYTES # BLD AUTO: 1.64 K/UL (ref 1–4.8)
MCH RBC QN AUTO: 30.8 PG (ref 27–31)
MCHC RBC AUTO-ENTMCNC: 33.1 G/DL (ref 32–36)
MCV RBC AUTO: 93 FL (ref 82–98)
NUCLEATED RBC (/100WBC) (OHS): 0 /100 WBC
PLATELET # BLD AUTO: 222 K/UL (ref 150–450)
PMV BLD AUTO: 11.6 FL (ref 9.2–12.9)
POTASSIUM SERPL-SCNC: 3.8 MMOL/L (ref 3.5–5.1)
PROT SERPL-MCNC: 7.6 GM/DL (ref 6–8.4)
RBC # BLD AUTO: 5.2 M/UL (ref 4.6–6.2)
RELATIVE EOSINOPHIL (OHS): 2 %
RELATIVE LYMPHOCYTE (OHS): 29.7 % (ref 18–48)
RELATIVE MONOCYTE (OHS): 13 % (ref 4–15)
RELATIVE NEUTROPHIL (OHS): 54.4 % (ref 38–73)
SODIUM SERPL-SCNC: 139 MMOL/L (ref 136–145)
WBC # BLD AUTO: 5.52 K/UL (ref 3.9–12.7)

## 2025-04-22 PROCEDURE — 36415 COLL VENOUS BLD VENIPUNCTURE: CPT | Mod: PO

## 2025-04-22 PROCEDURE — 99214 OFFICE O/P EST MOD 30 MIN: CPT | Mod: S$GLB,,,

## 2025-04-22 PROCEDURE — 1101F PT FALLS ASSESS-DOCD LE1/YR: CPT | Mod: CPTII,S$GLB,,

## 2025-04-22 PROCEDURE — 82310 ASSAY OF CALCIUM: CPT

## 2025-04-22 PROCEDURE — 3077F SYST BP >= 140 MM HG: CPT | Mod: CPTII,S$GLB,,

## 2025-04-22 PROCEDURE — 99999 PR PBB SHADOW E&M-EST. PATIENT-LVL IV: CPT | Mod: PBBFAC,,,

## 2025-04-22 PROCEDURE — 85025 COMPLETE CBC W/AUTO DIFF WBC: CPT

## 2025-04-22 PROCEDURE — 76700 US EXAM ABDOM COMPLETE: CPT | Mod: TC

## 2025-04-22 PROCEDURE — 1125F AMNT PAIN NOTED PAIN PRSNT: CPT | Mod: CPTII,S$GLB,,

## 2025-04-22 PROCEDURE — 83690 ASSAY OF LIPASE: CPT

## 2025-04-22 PROCEDURE — 76700 US EXAM ABDOM COMPLETE: CPT | Mod: 26,,, | Performed by: RADIOLOGY

## 2025-04-22 PROCEDURE — 1160F RVW MEDS BY RX/DR IN RCRD: CPT | Mod: CPTII,S$GLB,,

## 2025-04-22 PROCEDURE — 3008F BODY MASS INDEX DOCD: CPT | Mod: CPTII,S$GLB,,

## 2025-04-22 PROCEDURE — 1159F MED LIST DOCD IN RCRD: CPT | Mod: CPTII,S$GLB,,

## 2025-04-22 PROCEDURE — 82150 ASSAY OF AMYLASE: CPT

## 2025-04-22 PROCEDURE — 3078F DIAST BP <80 MM HG: CPT | Mod: CPTII,S$GLB,,

## 2025-04-22 PROCEDURE — 3288F FALL RISK ASSESSMENT DOCD: CPT | Mod: CPTII,S$GLB,,

## 2025-04-22 NOTE — PROGRESS NOTES
"Subjective     Patient ID: Darrian Velez is a 70 y.o. male.    Chief Complaint: Abdominal Pain      HPI    69 y/o male with HTN, HLD, and BMI of 39 presents to clinic for abdominal pain.       Complains of RUQ pain for 1 week. This is progressively getting worse. This is not affected by eating  He still has a normal appetite  He has not taken anything for this  States that he has been belching more   Pain with moving and at rest  Nothing makes better     Denies fever, early satiety, N/V/D, LUTS, constipation, loss of appetite, CP, SOB         Review of Systems   Constitutional:  Negative for fatigue and fever.   HENT:  Negative for trouble swallowing.    Respiratory:  Negative for cough, shortness of breath and wheezing.    Cardiovascular:  Negative for chest pain, palpitations and leg swelling.   Gastrointestinal:  Positive for abdominal pain and reflux. Negative for change in bowel habit, constipation, diarrhea, nausea and vomiting.   Genitourinary:  Negative for difficulty urinating and dysuria.   Neurological:  Negative for dizziness, light-headedness and headaches.          Objective     Vitals:    04/22/25 0825   BP: (!) 146/68   Pulse: 77   SpO2: 99%   Weight: 115.4 kg (254 lb 6.6 oz)   Height: 5' 7" (1.702 m)   PainSc:   8   PainLoc: Abdomen      Physical Exam  Constitutional:       General: He is not in acute distress.     Appearance: He is not toxic-appearing.   HENT:      Head: Normocephalic and atraumatic.   Eyes:      General:         Right eye: No discharge.         Left eye: No discharge.      Conjunctiva/sclera: Conjunctivae normal.   Cardiovascular:      Rate and Rhythm: Normal rate and regular rhythm.   Pulmonary:      Effort: Pulmonary effort is normal. No respiratory distress.      Breath sounds: Normal breath sounds. No wheezing.   Abdominal:      Palpations: There is no mass.      Tenderness: There is abdominal tenderness.      Comments: TTP RUQ   Musculoskeletal:      Right lower leg: No " edema.      Left lower leg: No edema.   Lymphadenopathy:      Cervical: No cervical adenopathy.   Skin:     Findings: No rash.   Neurological:      Mental Status: He is alert and oriented to person, place, and time.              Assessment and Plan     1. Right upper quadrant abdominal pain  -     Amylase; Future; Expected date: 04/22/2025  -     Lipase; Future; Expected date: 04/22/2025  -     CBC Auto Differential; Future; Expected date: 04/22/2025  -     Comprehensive Metabolic Panel; Future; Expected date: 04/22/2025  -     US Abdomen Complete; Future; Expected date: 04/22/2025    2. BMI 39.0-39.9,adult      Pt with RUQ pain. TTP on exam   Will get US to rule our gallbladder  Will get labs today     ED precautions given  Will contact with results          35 minutes of total time spent on the encounter, which includes face to face time and non-face to face time preparing to see the patient (eg, review of tests), Obtaining and/or reviewing separately obtained history, Documenting clinical information in the electronic or other health record, Independently interpreting results (not separately reported) and communicating results to the patient/family/caregiver, or Care coordination (not separately reported).      Bianca Levine PA-C  Family Practice/Internal Medicine   Office Phone: [phone]

## 2025-04-23 ENCOUNTER — TELEPHONE (OUTPATIENT)
Dept: FAMILY MEDICINE | Facility: CLINIC | Age: 70
End: 2025-04-23
Payer: MEDICARE

## 2025-04-23 DIAGNOSIS — R10.9 ABDOMINAL PAIN, UNSPECIFIED ABDOMINAL LOCATION: Primary | ICD-10-CM

## 2025-04-23 RX ORDER — PANTOPRAZOLE SODIUM 40 MG/1
40 TABLET, DELAYED RELEASE ORAL DAILY
Qty: 90 TABLET | Refills: 1 | Status: SHIPPED | OUTPATIENT
Start: 2025-04-23 | End: 2025-10-20

## 2025-04-23 NOTE — TELEPHONE ENCOUNTER
Called pt to schedule x-ray per Bianca. Appt is scheduled at [facility] for 9am on 4/24/25.

## 2025-04-23 NOTE — TELEPHONE ENCOUNTER
----- Message from Bianca Levine PA-C sent at 4/23/2025  2:34 PM CDT -----    Please call pt to schedule x ray  ----- Message -----  From: Danielle, Rad Results In  Sent: 4/22/2025   4:46 PM CDT  To: Bianca Levine PA-C

## 2025-04-23 NOTE — TELEPHONE ENCOUNTER
----- Message from Med Assistant Patel sent at 4/23/2025  3:02 PM CDT -----  Order for x-ray is not put in for me to schedule.  ----- Message -----  From: Bianca Levine PA-C  Sent: 4/23/2025   2:34 PM CDT  To: Julianna Valenzuela Staff    ----- Message from Bianca Levine PA-C sent at 4/23/2025  2:34 PM CDT -----    Please call pt to schedule x ray  ----- Message -----  From: Interface, Rad Results In  Sent: 4/22/2025   4:46 PM CDT  To: Bianca Levine PA-C

## 2025-04-24 ENCOUNTER — RESULTS FOLLOW-UP (OUTPATIENT)
Dept: FAMILY MEDICINE | Facility: CLINIC | Age: 70
End: 2025-04-24

## 2025-04-24 ENCOUNTER — HOSPITAL ENCOUNTER (OUTPATIENT)
Dept: RADIOLOGY | Facility: HOSPITAL | Age: 70
Discharge: HOME | End: 2025-04-24
Payer: MEDICARE

## 2025-04-24 DIAGNOSIS — R10.9 ABDOMINAL PAIN, UNSPECIFIED ABDOMINAL LOCATION: ICD-10-CM

## 2025-04-24 PROCEDURE — 74019 RADEX ABDOMEN 2 VIEWS: CPT | Mod: TC,FY

## 2025-04-24 PROCEDURE — 74019 RADEX ABDOMEN 2 VIEWS: CPT | Mod: 26,,, | Performed by: RADIOLOGY

## 2025-04-30 ENCOUNTER — OFFICE VISIT (OUTPATIENT)
Dept: GASTROENTEROLOGY | Facility: CLINIC | Age: 70
End: 2025-04-30
Payer: MEDICARE

## 2025-04-30 VITALS — BODY MASS INDEX: 39.85 KG/M2 | WEIGHT: 254.44 LBS

## 2025-04-30 DIAGNOSIS — R10.11 RIGHT UPPER QUADRANT ABDOMINAL PAIN: ICD-10-CM

## 2025-04-30 DIAGNOSIS — G58.9 NERVE ENTRAPMENT: Primary | ICD-10-CM

## 2025-04-30 PROCEDURE — 99999 PR PBB SHADOW E&M-EST. PATIENT-LVL III: CPT | Mod: PBBFAC,,, | Performed by: NURSE PRACTITIONER

## 2025-04-30 NOTE — PROGRESS NOTES
GASTROENTEROLOGY CLINIC NOTE    Chief Complaint: The primary encounter diagnosis was Nerve entrapment. A diagnosis of Right upper quadrant abdominal pain was also pertinent to this visit.  Referring provider/PCP: Balwinder Alvarado MD    Initial HPI 4/30/2025  Darrian Velez is a 70 y.o. male who is a new patient to me who presents to clinic for RUQ abdominal pain.   Evaluated by internal medicine. Ultrasound and abdominal xray unrevealing.     No lifting   Umbilical hernia repair with mesh    Abdominal Pain  How Long: 3 weeks   Frequency: daily   Location: RUQ   Quality: Sharp pain lasting few minutes   Onset: sudden   Radiate: no   Aggravated or Improved with bowel movement/eating/movement Aggravated by movement  No change with eating or bowel movements   Nausea/Vomiting/Unexplained Weight Loss: no   Pyrosis/Reflux/Dysphagia: no   Bowel Movements: Daily no changes in bowel habits   Melena/Hematochezia: no    Symptoms: N/a     Treatments: protonix  NSAIDs: no     GLP-1s: No  Anticoagulation or Antiplatelet: No    History of H.pylori: no  H.pylori Treatment:  Prior Upper Endoscopy: no  Prior Colonoscopy: 2020 with Dr. Shetty (Due 7/2025)  Impression:           - One 3 mm polyp in the descending colon, removed                         with a cold snare. Resected and retrieved.                         - Diverticulosis in the sigmoid colon and in the                         descending colon.                         - The examination was otherwise normal on direct                         and retroflexion views.     Recommendation: Repeat colonoscopy in 5 years    Pathology: Tubular adenoma    Family h/o Colon Cancer: No  Family h/o Crohn's Disease or Ulcerative Colitis: No  Family h/o Celiac Sprue: No  Abdominal Surgeries: umbilical hernia repair with mesh x2      Review of Systems   Constitutional:  Negative for weight loss.   HENT:  Negative for sore throat.    Eyes:  Negative for blurred vision.    Respiratory:  Negative for cough.    Cardiovascular:  Negative for chest pain.   Gastrointestinal:  Positive for abdominal pain. Negative for blood in stool, constipation, diarrhea, heartburn, melena, nausea and vomiting.   Genitourinary:  Negative for dysuria.   Musculoskeletal:  Negative for myalgias.   Skin:  Negative for rash.   Neurological:  Negative for headaches.   Endo/Heme/Allergies:  Negative for environmental allergies.   Psychiatric/Behavioral:  Negative for suicidal ideas. The patient is not nervous/anxious.        Past Medical History: has a past medical history of ALLERGIC RHINITIS, colonic polyps, Hyperlipidemia, Hypertension, MRSA infection, and Personal history of kidney stones.    Past Surgical History: has a past surgical history that includes Vasectomy; Hernia repair; Colonoscopy (N/A, 7/22/2020); Open reduction and internal fixation (ORIF) of fracture of olecranon process of ulna (Left, 8/7/2023); and Replacement of head of radius (Left, 8/7/2023).    Family History:family history includes Breast cancer in his mother; Colon cancer in his maternal grandmother; Coronary artery disease in his father; Dementia in his mother; Heart disease in his father; Hypertension in his mother; No Known Problems in his daughter, sister, and son; Testicular cancer in his brother.    Allergies:   Review of patient's allergies indicates:   Allergen Reactions    No known allergies        Social History: reports that he has never smoked. He has never used smokeless tobacco. He reports that he does not currently use alcohol. He reports that he does not use drugs.    Home medications: Medications Ordered Prior to Encounter[1]    Vital signs:  Wt 115.4 kg (254 lb 6.6 oz)   BMI 39.85 kg/m²     Physical Exam  Vitals reviewed.   Constitutional:       General: He is not in acute distress.     Appearance: Normal appearance. He is not ill-appearing.   HENT:      Head: Normocephalic.   Cardiovascular:      Rate and Rhythm:  "Normal rate and regular rhythm.      Heart sounds: Normal heart sounds. No murmur heard.  Pulmonary:      Effort: Pulmonary effort is normal. No respiratory distress.      Breath sounds: Normal breath sounds.   Chest:      Chest wall: No tenderness.   Abdominal:      General: Bowel sounds are normal. There is no distension.      Palpations: Abdomen is soft.      Tenderness: There is abdominal tenderness in the right upper quadrant and epigastric area. Negative signs include Zaragoza's sign.      Hernia: No hernia is present.          Comments: Positive Carnett's Maneuver  Rectus diastasis   Skin:     General: Skin is warm.   Neurological:      Mental Status: He is alert and oriented to person, place, and time.   Psychiatric:         Mood and Affect: Mood normal.         Behavior: Behavior normal.         Routine labs:  Lab Results   Component Value Date    WBC 5.52 04/22/2025    HGB 16.0 04/22/2025    HCT 48.3 04/22/2025    MCV 93 04/22/2025     04/22/2025     No results found for: "INR"  No results found for: "IRON", "FERRITIN", "TIBC", "FESATURATED"  Lab Results   Component Value Date     04/22/2025    K 3.8 04/22/2025     04/22/2025    CO2 22 (L) 04/22/2025    BUN 15 04/22/2025    CREATININE 1.0 04/22/2025     Lab Results   Component Value Date    ALBUMIN 4.2 04/22/2025    ALT 26 04/22/2025    AST 23 04/22/2025    ALKPHOS 74 04/22/2025    BILITOT 0.4 04/22/2025     No results found for: "GLUCOSE"  Lab Results   Component Value Date    TSH 3.832 12/03/2024     Lab Results   Component Value Date    CALCIUM 9.4 04/22/2025       Imaging:  X-Ray Abdomen Flat And Erect  Narrative: EXAMINATION:  XR ABDOMEN FLAT AND ERECT    CLINICAL HISTORY:  Unspecified abdominal pain    TECHNIQUE:  Flat and erect AP views of the abdomen were performed.    FINDINGS:  There is no free intraperitoneal air.  The lung bases are clear.  There are no dilated loops of bowel.  Impression: As above.    Electronically signed " by: Erasto Mccallum MD  Date:    04/24/2025  Time:    09:19    EXAMINATION:  US ABDOMEN COMPLETE     CLINICAL HISTORY:  Right upper quadrant pain     TECHNIQUE:  Complete abdominal ultrasound (including pancreas, aorta, liver, gallbladder, common bile duct, IVC, kidneys, and spleen) was performed.     FINDINGS:  The pancreas is obscured due to overlying bowel gas.  The visualized portion of the aorta is unremarkable.  The aorta is partially visualized due to overlying bowel gas.  The visualized portion of the IVC is unremarkable.  The gallbladder is unremarkable.  The common duct measures 0.4 cm.  The liver measures 15.3 cm and demonstrates an overall increased echogenicity consistent with a diffuse infiltrative process such as diffuse fatty infiltration.  The right kidney measures 13.2 cm and is unremarkable.  The spleen measures 9.1 cm and is unremarkable.  The left kidney measures 13.9 cm and has a normal appearance.     Impression:     Fatty liver.        Electronically signed by:Erasto Mccallum MD  Date:                                            04/22/2025  Time:                                           16:43    I have reviewed prior labs, imaging, and notes.      Assessment:  1. Nerve entrapment    2. Right upper quadrant abdominal pain      RUQ pain with movement. Pain characterized as sharp with sudden onset lasting 1-2 minutes.   Positive Carnett's maneuver on exam.     Plan:  Orders Placed This Encounter    CT Abdomen Pelvis W Wo Contrast    Ambulatory referral/consult to Pain Clinic     CT Abdomen Pelvis as ultrasound was unrevealing and presents with epigastric tenderness on exam.   Referral to pain management for further evaluation and treatment of nerve entrapment.   Salonpas lidocaine patches     Colonoscopy due 7/2025    Plan of care discussed with patient who is in agreement and verbalized understanding.     I have explained the planned procedures to the patient.The risks, benefits and alternatives of the  procedure were also explained in detail. Patient verbalized understanding, all questions were answered. The patient agrees to proceed as planned    Follow Up: LEATHA Burger, APRN,FNP-BC  [facility]    (Portions of this note were dictated using voice recognition software and may contain dictation related errors in spelling/grammar/syntax not found on text review)         [1]   Current Outpatient Medications on File Prior to Visit   Medication Sig Dispense Refill    atorvastatin (LIPITOR) 10 MG tablet Take 1 tablet (10 mg total) by mouth every evening. 90 tablet 3    fenofibrate micronized (LOFIBRA) 134 MG Cap Take 1 capsule (134 mg total) by mouth daily with breakfast. 90 capsule 3    losartan-hydrochlorothiazide 100-12.5 mg (HYZAAR) 100-12.5 mg Tab Take 1 tablet by mouth once daily. 90 tablet 3    pantoprazole (PROTONIX) 40 MG tablet Take 1 tablet (40 mg total) by mouth once daily. 90 tablet 1    potassium chloride SA (K-DUR,KLOR-CON) 20 MEQ tablet Take 1 tablet (20 mEq total) by mouth once daily. 90 tablet 3     No current facility-administered medications on file prior to visit.

## 2025-05-06 ENCOUNTER — HOSPITAL ENCOUNTER (OUTPATIENT)
Dept: RADIOLOGY | Facility: HOSPITAL | Age: 70
Discharge: HOME | End: 2025-05-06
Attending: NURSE PRACTITIONER
Payer: MEDICARE

## 2025-05-06 ENCOUNTER — RESULTS FOLLOW-UP (OUTPATIENT)
Dept: GASTROENTEROLOGY | Facility: CLINIC | Age: 70
End: 2025-05-06

## 2025-05-06 DIAGNOSIS — R10.11 RIGHT UPPER QUADRANT ABDOMINAL PAIN: ICD-10-CM

## 2025-05-06 PROCEDURE — 74178 CT ABD&PLV WO CNTR FLWD CNTR: CPT | Mod: TC

## 2025-05-06 PROCEDURE — 25500020 PHARM REV CODE 255: Performed by: NURSE PRACTITIONER

## 2025-05-06 PROCEDURE — 74178 CT ABD&PLV WO CNTR FLWD CNTR: CPT | Mod: 26,,, | Performed by: RADIOLOGY

## 2025-05-06 RX ADMIN — IOHEXOL 30 ML: 350 INJECTION, SOLUTION INTRAVENOUS at 08:05

## 2025-05-06 RX ADMIN — IOHEXOL 100 ML: 350 INJECTION, SOLUTION INTRAVENOUS at 09:05

## 2025-05-08 ENCOUNTER — OFFICE VISIT (OUTPATIENT)
Dept: PAIN MEDICINE | Facility: CLINIC | Age: 70
End: 2025-05-08
Payer: MEDICARE

## 2025-05-08 VITALS
DIASTOLIC BLOOD PRESSURE: 75 MMHG | BODY MASS INDEX: 39.93 KG/M2 | SYSTOLIC BLOOD PRESSURE: 146 MMHG | HEART RATE: 88 BPM | HEIGHT: 67 IN | WEIGHT: 254.44 LBS

## 2025-05-08 DIAGNOSIS — R10.11 RIGHT UPPER QUADRANT ABDOMINAL PAIN: ICD-10-CM

## 2025-05-08 DIAGNOSIS — G58.9 NERVE ENTRAPMENT: Primary | ICD-10-CM

## 2025-05-08 PROCEDURE — 3078F DIAST BP <80 MM HG: CPT | Mod: CPTII,S$GLB,, | Performed by: NURSE PRACTITIONER

## 2025-05-08 PROCEDURE — 1160F RVW MEDS BY RX/DR IN RCRD: CPT | Mod: CPTII,S$GLB,, | Performed by: NURSE PRACTITIONER

## 2025-05-08 PROCEDURE — 1159F MED LIST DOCD IN RCRD: CPT | Mod: CPTII,S$GLB,, | Performed by: NURSE PRACTITIONER

## 2025-05-08 PROCEDURE — G2211 COMPLEX E/M VISIT ADD ON: HCPCS | Mod: S$GLB,,, | Performed by: NURSE PRACTITIONER

## 2025-05-08 PROCEDURE — 1101F PT FALLS ASSESS-DOCD LE1/YR: CPT | Mod: CPTII,S$GLB,, | Performed by: NURSE PRACTITIONER

## 2025-05-08 PROCEDURE — 3288F FALL RISK ASSESSMENT DOCD: CPT | Mod: CPTII,S$GLB,, | Performed by: NURSE PRACTITIONER

## 2025-05-08 PROCEDURE — 99999 PR PBB SHADOW E&M-EST. PATIENT-LVL III: CPT | Mod: PBBFAC,,, | Performed by: NURSE PRACTITIONER

## 2025-05-08 PROCEDURE — 99204 OFFICE O/P NEW MOD 45 MIN: CPT | Mod: S$GLB,,, | Performed by: NURSE PRACTITIONER

## 2025-05-08 PROCEDURE — 3008F BODY MASS INDEX DOCD: CPT | Mod: CPTII,S$GLB,, | Performed by: NURSE PRACTITIONER

## 2025-05-08 PROCEDURE — 3077F SYST BP >= 140 MM HG: CPT | Mod: CPTII,S$GLB,, | Performed by: NURSE PRACTITIONER

## 2025-05-08 PROCEDURE — 1125F AMNT PAIN NOTED PAIN PRSNT: CPT | Mod: CPTII,S$GLB,, | Performed by: NURSE PRACTITIONER

## 2025-05-08 RX ORDER — TRIAMCINOLONE ACETONIDE 40 MG/ML
40 INJECTION, SUSPENSION INTRA-ARTICULAR; INTRAMUSCULAR
Status: SHIPPED | OUTPATIENT
Start: 2025-05-08

## 2025-05-08 NOTE — PROCEDURES
"Trigger point injections in the right upper quadrant under ultrasound w/steroids    Date/Time: 5/8/2025 10:00 AM    Performed by: Sven Maki FNP  Authorized by: Sven Maki FNP  Consent Done: Yes  Time out: Immediately prior to procedure a "time out" was called to verify the correct patient, procedure, equipment, support staff and site/side marked as required.  Indications: pain relief  Nerve block body site: Abdomen ( Right Upper Quandrant)  Laterality: right    Patient sedated: no  : triamcinolone acetonide injection 40 mg.  Patient position: supine  Needle size: 25 G  Location technique: ultrasound guidance  Local Anesthetic: bupivacaine 0.25% without epinephrine  Anesthetic total: 1.5 mL  Outcome: pain unchanged  Patient tolerance: Patient tolerated the procedure well with no immediate complications        "

## 2025-05-08 NOTE — PROGRESS NOTES
Chronic Pain - New Consult    Referring Physician: Joyce Burger NP    Chief Complaint:   Chief Complaint   Patient presents with    Abdominal Pain     Right upper quadrant        SUBJECTIVE:  05/08/2025  CHIEF COMPLAINT:  Right-sided abdominal pain    HPI:  Patient presents with right-sided abdominal pain that started approximately three weeks ago. Pain is localized to the right side of the abdomen, to the right of the navel line. He describes it as constant, varying in intensity, sometimes very sharp, rating it as 10/10 pain. Pain is exacerbated by movement, particularly when sitting up from a sofa or chair. He reports difficulty pushing himself up out of a chair. Pain worsens with excessive walking and sometimes occurs even when sitting still watching TV. He reports waking up with pain in the morning and going to bed with it at night. He denies any specific alleviating factors and has not tried any anti-inflammatory medications, as none were prescribed. All other scans were negative.    A previous medical assistant prescribed medication for acid reflux. He has a history of fatty liver diagnosed in September, which he has had for years. Almost two years ago, he fell, resulting in major surgery affecting three spots with 12 screws placed in his elbow. He still has numbness in his hand from this injury.    He has a history of hernia surgery with mesh replacement, which was redone twice due to mesh breakage.    He denies any radiation of pain to the front, any history of recent infections or antibiotic use.    SURGICAL HISTORY:  Patient underwent a hernia repair with mesh placement years ago. A couple of years after the initial surgery, he had a hernia repair revision due to mesh breakage. Two years after the previous revision, he underwent another hernia repair revision. Almost two years ago, the patient had major surgery on his arm/elbow, which resulted in 3 breaks and 12 screws placed in his  elbow.    MEDICAL HISTORY:  Patient has a history of fatty liver, diagnosed in September.    FAMILY HISTORY:  Family history is significant for heart disease. Patient's father passed away at age 55 or 56. He had major open heart surgery and lived for 2 years after the procedure.    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    SOCIAL HISTORY:  Patient's father drank alcohol and smoked after his heart surgery.    Patient denies night fever/night sweats, urinary incontinence, bowel incontinence, significant weight loss, significant motor weakness, and loss of sensations.    Physical Therapy/Home Exercise: no      Pain Disability Index Review:      5/8/2025     9:57 AM   Last 3 PDI Scores   Pain Disability Index (PDI) 2       Pain Medications:    - Adjuvant Medications:    - Others:       report:  Not applicable    Pain Procedures: none     Imaging 05/06/25:   CT ABDOMEN PELVIS W WO CONTRAST     CLINICAL HISTORY:  Abdominal pain, acute, nonlocalized;     TECHNIQUE:  5 mm axial images were obtained through the abdomen and pelvis with and without the administration of 100 cc of Omnipaque 350 IV contrast and 30 cc of oral contrast.  Coronal and sagittal reformats were performed.     COMPARISON:  CT 07/26/2016.     FINDINGS:  Heart: Normal in size.  No pericardial effusion.     Lungs: No consolidation.  No pleural effusions.     Liver: Normal in size.  0.8 cm right hepatic low-attenuation lesion (series 4, image 34), too small to characterize but favored to represent a benign cyst.  No intrahepatic bile duct dilatation.Portal vein, splenic vein and SMV are patent.     Gallbladder: Unremarkable.  Extra-hepatic bile ducts are normal in caliber.     Stomach: Unremarkable.     Duodenum: Unremarkable.     Spleen: Normal in size. No focal lesions.     Pancreas: Unremarkable.      Adrenal glands: Unremarkable     Kidneys: Normal in size and location.  1.8 cm exophytic right renal cyst, slightly increased in size when compared to the previous exam.  Additional right renal cortical hypodensity, likely a cyst.  No cortical enhancing lesions.  No nephrolithiasis.  No hydronephrosis or hydroureter.  Circumferential bladder wall thickening, likely related to partial decompression.     Genital organs: Prostate appears within normal limits.No pelvic free fluid.     Bowel: Small bowel is normal in caliber.  Colon is unremarkable.  Appendix is normal.  No obstruction or inflammatory changes.     Aorta: Celiac artery, SMA, bilateral renal arteries and CLAUDIA are patent.  No aneurysm.  No dissection.     IVC/Veins: Unremarkable.     Peritoneum/mesentery: No ascites or intra-abdominal free air.  No focal mesenteric masses.     Lymph nodes: No lymphadenopathy.     Subcutaneous soft tissues: Postsurgical change of anterior abdominal wall hernia repair with mesh placement.     Osseous structures: Degenerative changes of the spine.  No acute fractures. No suspicious lytic or blastic lesions.    Imaging 04/23/25  XR ABDOMEN FLAT AND ERECT     CLINICAL HISTORY:  Unspecified abdominal pain     TECHNIQUE:  Flat and erect AP views of the abdomen were performed.     FINDINGS:  There is no free intraperitoneal air.  The lung bases are clear.  There are no dilated loops of bowel.    Past Medical History:   Diagnosis Date    ALLERGIC RHINITIS     Hx of colonic polyps 4/05/2012    Tubular adenoma    Hyperlipidemia     Hypertension     MRSA infection     left medial thigh abscess & cellulitis    Personal history of kidney stones      Past Surgical History:   Procedure Laterality Date    COLONOSCOPY N/A 7/22/2020    Procedure: COLONOSCOPY;  Surgeon: Donato Shetty MD;  Location: [facility];  Service: Endoscopy;  Laterality: N/A;    HERNIA REPAIR      umbilical hernia repair with mesh    OPEN REDUCTION AND  INTERNAL FIXATION (ORIF) OF FRACTURE OF OLECRANON PROCESS OF ULNA Left 2023    Procedure: ORIF, FRACTURE, OLECRANON,;  Surgeon: Santosh Zavala MD;  Location: [address];  Service: Orthopedics;  Laterality: Left;    REPLACEMENT OF HEAD OF RADIUS Left 2023    Procedure: REPLACEMENT, RADIUS, HEAD;  Surgeon: Santosh Zavala MD;  Location: [facility] OR [address] [place] [address];  Service: Orthopedics;  Laterality: Left;    VASECTOMY       Social History[1]  Family History   Problem Relation Name Age of Onset    Breast cancer Mother      Hypertension Mother      Dementia Mother      Coronary artery disease Father           age 59    Heart disease Father      No Known Problems Sister x1     Testicular cancer Brother x1     No Known Problems Daughter x1     No Known Problems Son x1     Colon cancer Maternal Grandmother         Review of patient's allergies indicates:   Allergen Reactions    No known allergies        Current Outpatient Medications   Medication Sig    atorvastatin (LIPITOR) 10 MG tablet Take 1 tablet (10 mg total) by mouth every evening.    fenofibrate micronized (LOFIBRA) 134 MG Cap Take 1 capsule (134 mg total) by mouth daily with breakfast.    losartan-hydrochlorothiazide 100-12.5 mg (HYZAAR) 100-12.5 mg Tab Take 1 tablet by mouth once daily.    pantoprazole (PROTONIX) 40 MG tablet Take 1 tablet (40 mg total) by mouth once daily.    potassium chloride SA (K-DUR,KLOR-CON) 20 MEQ tablet Take 1 tablet (20 mEq total) by mouth once daily.     No current facility-administered medications for this visit.       ROS:  General: -fever, -chills, -fatigue, -weight gain, -weight loss  Eyes: -vision changes, -redness, -discharge  ENT: -ear pain, -nasal congestion, -sore throat  Cardiovascular: -chest pain, -palpitations, -lower extremity edema  Respiratory: -cough, -shortness of breath  Gastrointestinal: +abdominal pain, -nausea, -vomiting, -diarrhea, -constipation, -blood in stool  Genitourinary: -dysuria,  "-hematuria, -frequency  Musculoskeletal: -joint pain, -muscle pain, +pain with movement, +difficulty standing up  Skin: -rash, -lesion  Neurological: -headache, -dizziness, +numbness, -tingling  Psychiatric: -anxiety, -depression, -sleep difficulty       OBJECTIVE:    BP (!) 146/75   Pulse 88   Ht 5' 7" (1.702 m)   Wt 115.4 kg (254 lb 6.6 oz)   BMI 39.85 kg/m²     PHYSICAL EXAMINATION:  General    Constitutional: He appears well-nourished.   Abdominal: Bowel sounds are normal. He exhibits distension. There is abdominal tenderness. There is rebound.         Back (L-Spine & T-Spine) / Neck (C-Spine) Exam     Comments:  TTP RUQ  + Carnett's sign RUQ          ASSESSMENT: 70 y.o. male with right upper pain, consistent with      1. Nerve entrapment  Ambulatory referral/consult to Pain Clinic      2. Right upper quadrant abdominal pain              Assessment & Plan    RIGHT UPPER QUADRANT PAIN:  - Performed ultrasound-guided trigger point injection with Kenalog in the area of maximal tenderness in the right abdomen. Patient tolerated the procedure well.( See procedure note)   - Potential future procedures if current treatment is ineffective include chemical injection with phenol or surgical procedure to burn or freeze the nerves.  - Monitor injection site for any adverse reactions.  - consider ibuprofen and gabapentin for pain if injection is unsuccessful.  -consider physical therapy in the future  -previous imaging reviewed and discussed all images were found  unremarkable    FOLLOW-UP:  - Send message on Wattbot in 2 weeks to report on injection effectiveness.  - Follow up in person if pain not resolved after 2 weeks.     The above plan and management options were discussed at length with patient. Patient is in agreement with the above and verbalized understanding. It will be communicated with the referring physician via electronic record, fax, or mail.      Sven Maki NP-C  Interventional Pain Management    This " note was generated with the assistance of ambient listening technology. Verbal consent was obtained by the patient and accompanying visitor(s) for the recording of patient appointment to facilitate this note. I attest to having reviewed and edited the generated note for accuracy, though some syntax or spelling errors may persist. Please contact the author of this note for any clarification.    05/08/2025         [1]   Social History  Socioeconomic History    Marital status:    Occupational History    Occupation:    Tobacco Use    Smoking status: Never    Smokeless tobacco: Never   Substance and Sexual Activity    Alcohol use: Not Currently     Comment: occasionally     Drug use: No    Sexual activity: Not Currently     Partners: Female     Social Drivers of Health     Financial Resource Strain: Low Risk  (4/8/2025)    Overall Financial Resource Strain (CARDIA)     Difficulty of Paying Living Expenses: Not hard at all   Food Insecurity: No Food Insecurity (4/8/2025)    Hunger Vital Sign     Worried About Running Out of Food in the Last Year: Never true     Ran Out of Food in the Last Year: Never true   Transportation Needs: No Transportation Needs (4/8/2025)    PRAPARE - Transportation     Lack of Transportation (Medical): No     Lack of Transportation (Non-Medical): No   Physical Activity: Sufficiently Active (4/8/2025)    Exercise Vital Sign     Days of Exercise per Week: 5 days     Minutes of Exercise per Session: 100 min   Stress: No Stress Concern Present (4/8/2025)    [employer] - Occupational Stress Questionnaire     Feeling of Stress : Not at all   Housing Stability: Low Risk  (4/8/2025)    Housing Stability Vital Sign     Unable to Pay for Housing in the Last Year: No     Homeless in the Last Year: No

## 2025-05-22 ENCOUNTER — PATIENT MESSAGE (OUTPATIENT)
Dept: PAIN MEDICINE | Facility: CLINIC | Age: 70
End: 2025-05-22

## 2025-05-22 ENCOUNTER — OFFICE VISIT (OUTPATIENT)
Dept: PAIN MEDICINE | Facility: CLINIC | Age: 70
End: 2025-05-22
Payer: MEDICARE

## 2025-05-22 VITALS
DIASTOLIC BLOOD PRESSURE: 83 MMHG | HEIGHT: 67 IN | HEART RATE: 84 BPM | BODY MASS INDEX: 39.26 KG/M2 | SYSTOLIC BLOOD PRESSURE: 146 MMHG | WEIGHT: 250.13 LBS

## 2025-05-22 DIAGNOSIS — M79.2 NERVE PAIN: ICD-10-CM

## 2025-05-22 DIAGNOSIS — R10.11 RIGHT UPPER QUADRANT ABDOMINAL PAIN: Primary | ICD-10-CM

## 2025-05-22 DIAGNOSIS — G58.9 NERVE ENTRAPMENT: ICD-10-CM

## 2025-05-22 DIAGNOSIS — G58.9 NERVE ENTRAPMENT: Primary | ICD-10-CM

## 2025-05-22 PROCEDURE — 1101F PT FALLS ASSESS-DOCD LE1/YR: CPT | Mod: CPTII,S$GLB,, | Performed by: NURSE PRACTITIONER

## 2025-05-22 PROCEDURE — 1125F AMNT PAIN NOTED PAIN PRSNT: CPT | Mod: CPTII,S$GLB,, | Performed by: NURSE PRACTITIONER

## 2025-05-22 PROCEDURE — 1160F RVW MEDS BY RX/DR IN RCRD: CPT | Mod: CPTII,S$GLB,, | Performed by: NURSE PRACTITIONER

## 2025-05-22 PROCEDURE — 3079F DIAST BP 80-89 MM HG: CPT | Mod: CPTII,S$GLB,, | Performed by: NURSE PRACTITIONER

## 2025-05-22 PROCEDURE — 99999 PR PBB SHADOW E&M-EST. PATIENT-LVL III: CPT | Mod: PBBFAC,,, | Performed by: NURSE PRACTITIONER

## 2025-05-22 PROCEDURE — 1159F MED LIST DOCD IN RCRD: CPT | Mod: CPTII,S$GLB,, | Performed by: NURSE PRACTITIONER

## 2025-05-22 PROCEDURE — 3008F BODY MASS INDEX DOCD: CPT | Mod: CPTII,S$GLB,, | Performed by: NURSE PRACTITIONER

## 2025-05-22 PROCEDURE — 99214 OFFICE O/P EST MOD 30 MIN: CPT | Mod: S$GLB,,, | Performed by: NURSE PRACTITIONER

## 2025-05-22 PROCEDURE — 3288F FALL RISK ASSESSMENT DOCD: CPT | Mod: CPTII,S$GLB,, | Performed by: NURSE PRACTITIONER

## 2025-05-22 PROCEDURE — 3077F SYST BP >= 140 MM HG: CPT | Mod: CPTII,S$GLB,, | Performed by: NURSE PRACTITIONER

## 2025-05-22 PROCEDURE — G2211 COMPLEX E/M VISIT ADD ON: HCPCS | Mod: S$GLB,,, | Performed by: NURSE PRACTITIONER

## 2025-05-22 RX ORDER — PREGABALIN 25 MG/1
25 CAPSULE ORAL NIGHTLY
Qty: 30 CAPSULE | Refills: 0 | Status: SHIPPED | OUTPATIENT
Start: 2025-05-22 | End: 2025-06-21

## 2025-05-22 NOTE — PROGRESS NOTES
Chronic Pain -Established Clinic     Referring Physician: Balwinder Alvarado*    Chief Complaint:   Chief Complaint   Patient presents with    Abdominal Pain    Follow-up        SUBJECTIVE:   Interval update 05/22/25:  Patient returns to clinic for follow-up from trigger-point injection, patient received 80% relief for three days interim patient's continues to report right-sided abdominal pain that has been ongoing.  Etiology is unknown at this point discussed with patient that we may optimize her medications in effort to reduce some of his symptoms.  Patient denies any recent incident or trauma denies any falls patient denies any bowel bladder function saddle anesthesia at this time.  Pain is constant it varies in intensity still pain is very sharp sometimes 10/10.  Patient once pain is also exacerbated by certain movements particularly when sitting on the sofa or chair.      CHIEF COMPLAINT: 05/08/2025  Right-sided abdominal pain    HPI:  Patient presents with right-sided abdominal pain that started approximately three weeks ago. Pain is localized to the right side of the abdomen, to the right of the navel line. He describes it as constant, varying in intensity, sometimes very sharp, rating it as 10/10 pain. Pain is exacerbated by movement, particularly when sitting up from a sofa or chair. He reports difficulty pushing himself up out of a chair. Pain worsens with excessive walking and sometimes occurs even when sitting still watching TV. He reports waking up with pain in the morning and going to bed with it at night. He denies any specific alleviating factors and has not tried any anti-inflammatory medications, as none were prescribed. All other scans were negative.    A previous medical assistant prescribed medication for acid reflux. He has a history of fatty liver diagnosed in September, which he has had for years. Almost two years ago, he fell, resulting in major surgery affecting three spots with 12  screws placed in his elbow. He still has numbness in his hand from this injury.    He has a history of hernia surgery with mesh replacement, which was redone twice due to mesh breakage.    He denies any radiation of pain to the front, any history of recent infections or antibiotic use.    SURGICAL HISTORY:  Patient underwent a hernia repair with mesh placement years ago. A couple of years after the initial surgery, he had a hernia repair revision due to mesh breakage. Two years after the previous revision, he underwent another hernia repair revision. Almost two years ago, the patient had major surgery on his arm/elbow, which resulted in 3 breaks and 12 screws placed in his elbow.    MEDICAL HISTORY:  Patient has a history of fatty liver, diagnosed in September.    FAMILY HISTORY:  Family history is significant for heart disease. Patient's father passed away at age 55 or 56. He had major open heart surgery and lived for 2 years after the procedure.    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 He worked 40 hours per week and performed yard work until his longterm.    SOCIAL HISTORY:  Patient's father drank alcohol and smoked after his heart surgery.    Patient denies night fever/night sweats, urinary incontinence, bowel incontinence, significant weight loss, significant motor weakness, and loss of sensations.    Physical Therapy/Home Exercise: no      Pain Disability Index Review:      5/22/2025     3:07 PM 5/8/2025     9:57 AM   Last 3 PDI Scores   Pain Disability Index (PDI) 49 63       Pain Medications:    - Adjuvant Medications:    - Others:       report:  Not applicable    Pain Procedures: none     Imaging 05/06/25:   CT ABDOMEN PELVIS W WO CONTRAST     CLINICAL HISTORY:  Abdominal pain, acute, nonlocalized;     TECHNIQUE:  5 mm axial images were obtained through the abdomen and pelvis with and without the administration of 100 cc  of Omnipaque 350 IV contrast and 30 cc of oral contrast.  Coronal and sagittal reformats were performed.     COMPARISON:  CT 07/26/2016.     FINDINGS:  Heart: Normal in size.  No pericardial effusion.     Lungs: No consolidation.  No pleural effusions.     Liver: Normal in size.  0.8 cm right hepatic low-attenuation lesion (series 4, image 34), too small to characterize but favored to represent a benign cyst.  No intrahepatic bile duct dilatation.Portal vein, splenic vein and SMV are patent.     Gallbladder: Unremarkable.  Extra-hepatic bile ducts are normal in caliber.     Stomach: Unremarkable.     Duodenum: Unremarkable.     Spleen: Normal in size. No focal lesions.     Pancreas: Unremarkable.     Adrenal glands: Unremarkable     Kidneys: Normal in size and location.  1.8 cm exophytic right renal cyst, slightly increased in size when compared to the previous exam.  Additional right renal cortical hypodensity, likely a cyst.  No cortical enhancing lesions.  No nephrolithiasis.  No hydronephrosis or hydroureter.  Circumferential bladder wall thickening, likely related to partial decompression.     Genital organs: Prostate appears within normal limits.No pelvic free fluid.     Bowel: Small bowel is normal in caliber.  Colon is unremarkable.  Appendix is normal.  No obstruction or inflammatory changes.     Aorta: Celiac artery, SMA, bilateral renal arteries and CLAUDIA are patent.  No aneurysm.  No dissection.     IVC/Veins: Unremarkable.     Peritoneum/mesentery: No ascites or intra-abdominal free air.  No focal mesenteric masses.     Lymph nodes: No lymphadenopathy.     Subcutaneous soft tissues: Postsurgical change of anterior abdominal wall hernia repair with mesh placement.     Osseous structures: Degenerative changes of the spine.  No acute fractures. No suspicious lytic or blastic lesions.    Imaging 04/23/25  XR ABDOMEN FLAT AND ERECT     CLINICAL HISTORY:  Unspecified abdominal pain     TECHNIQUE:  Flat and  erect AP views of the abdomen were performed.     FINDINGS:  There is no free intraperitoneal air.  The lung bases are clear.  There are no dilated loops of bowel.    Past Medical History:   Diagnosis Date    ALLERGIC RHINITIS     Hx of colonic polyps 2012    Tubular adenoma    Hyperlipidemia     Hypertension     MRSA infection     left medial thigh abscess & cellulitis    Personal history of kidney stones      Past Surgical History:   Procedure Laterality Date    COLONOSCOPY N/A 2020    Procedure: COLONOSCOPY;  Surgeon: Donato Shetty MD;  Location: [facility];  Service: Endoscopy;  Laterality: N/A;    HERNIA REPAIR      umbilical hernia repair with mesh    OPEN REDUCTION AND INTERNAL FIXATION (ORIF) OF FRACTURE OF OLECRANON PROCESS OF ULNA Left 2023    Procedure: ORIF, FRACTURE, OLECRANON,;  Surgeon: Santosh Zavala MD;  Location: [facility] OR [address] [place] [address];  Service: Orthopedics;  Laterality: Left;    REPLACEMENT OF HEAD OF RADIUS Left 2023    Procedure: REPLACEMENT, RADIUS, HEAD;  Surgeon: Santosh Zavala MD;  [facility];  Service: Orthopedics;  Laterality: Left;    VASECTOMY       Social History[1]  Family History   Problem Relation Name Age of Onset    Breast cancer Mother      Hypertension Mother      Dementia Mother      Coronary artery disease Father           age 59    Heart disease Father      No Known Problems Sister x1     Testicular cancer Brother x1     No Known Problems Daughter x1     No Known Problems Son x1     Colon cancer Maternal Grandmother         Review of patient's allergies indicates:   Allergen Reactions    No known allergies        Current Outpatient Medications   Medication Sig    atorvastatin (LIPITOR) 10 MG tablet Take 1 tablet (10 mg total) by mouth every evening.    fenofibrate micronized (LOFIBRA) 134 MG Cap Take 1 capsule (134 mg total) by mouth daily with breakfast.    losartan-hydrochlorothiazide 100-12.5 mg (HYZAAR) 100-12.5 mg Tab  "Take 1 tablet by mouth once daily.    pantoprazole (PROTONIX) 40 MG tablet Take 1 tablet (40 mg total) by mouth once daily.    potassium chloride SA (K-DUR,KLOR-CON) 20 MEQ tablet Take 1 tablet (20 mEq total) by mouth once daily.     Current Facility-Administered Medications   Medication    triamcinolone acetonide injection 40 mg       ROS:  General: -fever, -chills, -fatigue, -weight gain, -weight loss  Eyes: -vision changes, -redness, -discharge  ENT: -ear pain, -nasal congestion, -sore throat  Cardiovascular: -chest pain, -palpitations, -lower extremity edema  Respiratory: -cough, -shortness of breath  Gastrointestinal: +abdominal pain, -nausea, -vomiting, -diarrhea, -constipation, -blood in stool  Genitourinary: -dysuria, -hematuria, -frequency  Musculoskeletal: -joint pain, -muscle pain, +pain with movement, +difficulty standing up  Skin: -rash, -lesion  Neurological: -headache, -dizziness, +numbness, -tingling  Psychiatric: -anxiety, -depression, -sleep difficulty       OBJECTIVE:    BP (!) 146/83   Pulse 84   Ht 5' 7" (1.702 m)   Wt 113.5 kg (250 lb 1.8 oz)   BMI 39.17 kg/m²     PHYSICAL EXAMINATION:  General    Constitutional: He appears well-nourished.   Abdominal: Bowel sounds are normal. He exhibits distension. There is abdominal tenderness. There is rebound.         Back (L-Spine & T-Spine) / Neck (C-Spine) Exam     Comments:  TTP RUQ  + Carnett's sign RUQ          ASSESSMENT: 70 y.o. male with right upper pain, consistent with      No diagnosis found.        Assessment & Plan    RIGHT UPPER QUADRANT PAIN:  -no procedures recommended at this time  -start Lyrica 25 mg q.h.s. for 4 weeks 30 tablets  - consider referral to [facility]/Dr. Miller in the future.   - Potential future procedures if current treatment is ineffective include chemical injection with phenol or surgical procedure to burn or freeze the nerves.  - Monitor injection site for any adverse reactions.  - consider ibuprofen " and gabapentin for pain if injection is unsuccessful.  -consider physical therapy in the future  -previous imaging reviewed and discussed all images were found  unremarkable    FOLLOW-UP:  - virtual follow up in 4 weeks to discuss Lyrica and its effectiveness.    The above plan and management options were discussed at length with patient. Patient is in agreement with the above and verbalized understanding. It will be communicated with the referring physician via electronic record, fax, or mail.    BRYCE Vásquez  Interventional Pain Management    This note was generated with the assistance of ambient listening technology. Verbal consent was obtained by the patient and accompanying visitor(s) for the recording of patient appointment to facilitate this note. I attest to having reviewed and edited the generated note for accuracy, though some syntax or spelling errors may persist. Please contact the author of this note for any clarification.    05/22/2025           [1]   Social History  Socioeconomic History    Marital status:    Occupational History    Occupation:    Tobacco Use    Smoking status: Never    Smokeless tobacco: Never   Substance and Sexual Activity    Alcohol use: Not Currently     Comment: occasionally     Drug use: No    Sexual activity: Not Currently     Partners: Female     Social Drivers of Health     Financial Resource Strain: Low Risk  (4/8/2025)    Overall Financial Resource Strain (CARDIA)     Difficulty of Paying Living Expenses: Not hard at all   Food Insecurity: No Food Insecurity (4/8/2025)    Hunger Vital Sign     Worried About Running Out of Food in the Last Year: Never true     Ran Out of Food in the Last Year: Never true   Transportation Needs: No Transportation Needs (4/8/2025)    PRAPARE - Transportation     Lack of Transportation (Medical): No     Lack of Transportation (Non-Medical): No   Physical Activity: Sufficiently Active (4/8/2025)    Exercise  Vital Sign     Days of Exercise per Week: 5 days     Minutes of Exercise per Session: 100 min   Stress: No Stress Concern Present (4/8/2025)    [employer] [employer] - Occupational Stress Questionnaire     Feeling of Stress : Not at all   Housing Stability: Low Risk  (4/8/2025)    Housing Stability Vital Sign     Unable to Pay for Housing in the Last Year: No     Homeless in the Last Year: No

## 2025-06-02 ENCOUNTER — LAB VISIT (OUTPATIENT)
Dept: LAB | Facility: HOSPITAL | Age: 70
End: 2025-06-02
Attending: FAMILY MEDICINE
Payer: MEDICARE

## 2025-06-02 DIAGNOSIS — Z12.5 SCREENING FOR PROSTATE CANCER: ICD-10-CM

## 2025-06-02 DIAGNOSIS — R73.03 PREDIABETES: ICD-10-CM

## 2025-06-02 DIAGNOSIS — I10 PRIMARY HYPERTENSION: ICD-10-CM

## 2025-06-02 DIAGNOSIS — E66.812 CLASS 2 OBESITY WITH BODY MASS INDEX (BMI) OF 39.0 TO 39.9 IN ADULT, UNSPECIFIED OBESITY TYPE, UNSPECIFIED WHETHER SERIOUS COMORBIDITY PRESENT: ICD-10-CM

## 2025-06-02 LAB
ABSOLUTE EOSINOPHIL (OHS): 0.35 K/UL
ABSOLUTE MONOCYTE (OHS): 1.13 K/UL (ref 0.3–1)
ABSOLUTE NEUTROPHIL COUNT (OHS): 3.76 K/UL (ref 1.8–7.7)
ALBUMIN SERPL BCP-MCNC: 4.3 G/DL (ref 3.5–5.2)
ALP SERPL-CCNC: 78 UNIT/L (ref 40–150)
ALT SERPL W/O P-5'-P-CCNC: 31 UNIT/L (ref 10–44)
ANION GAP (OHS): 10 MMOL/L (ref 8–16)
AST SERPL-CCNC: 26 UNIT/L (ref 11–45)
BASOPHILS # BLD AUTO: 0.08 K/UL
BASOPHILS NFR BLD AUTO: 1.1 %
BILIRUB SERPL-MCNC: 0.6 MG/DL (ref 0.1–1)
BILIRUB UR QL STRIP.AUTO: NEGATIVE
BUN SERPL-MCNC: 15 MG/DL (ref 8–23)
CALCIUM SERPL-MCNC: 9.4 MG/DL (ref 8.7–10.5)
CHLORIDE SERPL-SCNC: 103 MMOL/L (ref 95–110)
CHOLEST SERPL-MCNC: 139 MG/DL (ref 120–199)
CHOLEST/HDLC SERPL: 4 {RATIO} (ref 2–5)
CLARITY UR: CLEAR
CO2 SERPL-SCNC: 24 MMOL/L (ref 23–29)
COLOR UR AUTO: YELLOW
CREAT SERPL-MCNC: 1.1 MG/DL (ref 0.5–1.4)
EAG (OHS): 114 MG/DL (ref 68–131)
ERYTHROCYTE [DISTWIDTH] IN BLOOD BY AUTOMATED COUNT: 13.1 % (ref 11.5–14.5)
GFR SERPLBLD CREATININE-BSD FMLA CKD-EPI: >60 ML/MIN/1.73/M2
GLUCOSE SERPL-MCNC: 131 MG/DL (ref 70–110)
GLUCOSE UR QL STRIP: NEGATIVE
HBA1C MFR BLD: 5.6 % (ref 4–5.6)
HCT VFR BLD AUTO: 47.6 % (ref 40–54)
HDLC SERPL-MCNC: 35 MG/DL (ref 40–75)
HDLC SERPL: 25.2 % (ref 20–50)
HGB BLD-MCNC: 15.8 GM/DL (ref 14–18)
HGB UR QL STRIP: NEGATIVE
IMM GRANULOCYTES # BLD AUTO: 0.02 K/UL (ref 0–0.04)
IMM GRANULOCYTES NFR BLD AUTO: 0.3 % (ref 0–0.5)
KETONES UR QL STRIP: NEGATIVE
LDLC SERPL CALC-MCNC: 37.6 MG/DL (ref 63–159)
LEUKOCYTE ESTERASE UR QL STRIP: NEGATIVE
LYMPHOCYTES # BLD AUTO: 1.79 K/UL (ref 1–4.8)
MCH RBC QN AUTO: 30.9 PG (ref 27–31)
MCHC RBC AUTO-ENTMCNC: 33.2 G/DL (ref 32–36)
MCV RBC AUTO: 93 FL (ref 82–98)
NITRITE UR QL STRIP: NEGATIVE
NONHDLC SERPL-MCNC: 104 MG/DL
NUCLEATED RBC (/100WBC) (OHS): 0 /100 WBC
PH UR STRIP: 6 [PH]
PLATELET # BLD AUTO: 235 K/UL (ref 150–450)
PMV BLD AUTO: 11.8 FL (ref 9.2–12.9)
POTASSIUM SERPL-SCNC: 3.7 MMOL/L (ref 3.5–5.1)
PROT SERPL-MCNC: 7.3 GM/DL (ref 6–8.4)
PROT UR QL STRIP: NEGATIVE
PSA SERPL-MCNC: 0.17 NG/ML
RBC # BLD AUTO: 5.11 M/UL (ref 4.6–6.2)
RELATIVE EOSINOPHIL (OHS): 4.9 %
RELATIVE LYMPHOCYTE (OHS): 25.1 % (ref 18–48)
RELATIVE MONOCYTE (OHS): 15.8 % (ref 4–15)
RELATIVE NEUTROPHIL (OHS): 52.8 % (ref 38–73)
SODIUM SERPL-SCNC: 137 MMOL/L (ref 136–145)
SP GR UR STRIP: 1.02
T4 FREE SERPL-MCNC: 1.07 NG/DL (ref 0.71–1.51)
TRIGL SERPL-MCNC: 332 MG/DL (ref 30–150)
TSH SERPL-ACNC: 4.29 UIU/ML (ref 0.4–4)
UROBILINOGEN UR STRIP-ACNC: ABNORMAL EU/DL
WBC # BLD AUTO: 7.13 K/UL (ref 3.9–12.7)

## 2025-06-02 PROCEDURE — 81003 URINALYSIS AUTO W/O SCOPE: CPT

## 2025-06-02 PROCEDURE — 83036 HEMOGLOBIN GLYCOSYLATED A1C: CPT

## 2025-06-02 PROCEDURE — 84443 ASSAY THYROID STIM HORMONE: CPT

## 2025-06-02 PROCEDURE — 36415 COLL VENOUS BLD VENIPUNCTURE: CPT | Mod: PO

## 2025-06-02 PROCEDURE — 85025 COMPLETE CBC W/AUTO DIFF WBC: CPT

## 2025-06-02 PROCEDURE — 84439 ASSAY OF FREE THYROXINE: CPT

## 2025-06-02 PROCEDURE — 80061 LIPID PANEL: CPT

## 2025-06-02 PROCEDURE — 84153 ASSAY OF PSA TOTAL: CPT

## 2025-06-02 PROCEDURE — 82040 ASSAY OF SERUM ALBUMIN: CPT

## 2025-06-06 ENCOUNTER — PATIENT MESSAGE (OUTPATIENT)
Dept: ADMINISTRATIVE | Facility: HOSPITAL | Age: 70
End: 2025-06-06
Payer: MEDICARE

## 2025-06-11 ENCOUNTER — OFFICE VISIT (OUTPATIENT)
Dept: FAMILY MEDICINE | Facility: CLINIC | Age: 70
End: 2025-06-11
Payer: MEDICARE

## 2025-06-11 VITALS
WEIGHT: 250 LBS | SYSTOLIC BLOOD PRESSURE: 130 MMHG | HEIGHT: 67 IN | BODY MASS INDEX: 39.24 KG/M2 | HEART RATE: 88 BPM | OXYGEN SATURATION: 96 % | DIASTOLIC BLOOD PRESSURE: 74 MMHG

## 2025-06-11 DIAGNOSIS — N32.89 BLADDER WALL THICKENING: ICD-10-CM

## 2025-06-11 DIAGNOSIS — E66.812 CLASS 2 OBESITY WITH BODY MASS INDEX (BMI) OF 39.0 TO 39.9 IN ADULT, UNSPECIFIED OBESITY TYPE, UNSPECIFIED WHETHER SERIOUS COMORBIDITY PRESENT: Primary | ICD-10-CM

## 2025-06-11 DIAGNOSIS — R10.10 UPPER ABDOMINAL PAIN, UNSPECIFIED: ICD-10-CM

## 2025-06-11 DIAGNOSIS — I10 PRIMARY HYPERTENSION: ICD-10-CM

## 2025-06-11 DIAGNOSIS — K76.0 FATTY LIVER: ICD-10-CM

## 2025-06-11 DIAGNOSIS — K76.89 LIVER CYST: ICD-10-CM

## 2025-06-11 DIAGNOSIS — Z12.11 SCREEN FOR COLON CANCER: ICD-10-CM

## 2025-06-11 DIAGNOSIS — E11.9 TYPE 2 DIABETES MELLITUS WITHOUT COMPLICATION, WITHOUT LONG-TERM CURRENT USE OF INSULIN: ICD-10-CM

## 2025-06-11 DIAGNOSIS — E11.65 TYPE 2 DIABETES MELLITUS WITH HYPERGLYCEMIA, WITHOUT LONG-TERM CURRENT USE OF INSULIN: ICD-10-CM

## 2025-06-11 DIAGNOSIS — E03.9 HYPOTHYROIDISM, UNSPECIFIED TYPE: ICD-10-CM

## 2025-06-11 PROCEDURE — 99999 PR PBB SHADOW E&M-EST. PATIENT-LVL V: CPT | Mod: PBBFAC,,, | Performed by: FAMILY MEDICINE

## 2025-06-11 RX ORDER — TIRZEPATIDE 5 MG/.5ML
5 INJECTION, SOLUTION SUBCUTANEOUS
Qty: 6 ML | Refills: 3 | Status: SHIPPED | OUTPATIENT
Start: 2025-06-11 | End: 2026-06-11

## 2025-06-11 NOTE — PROGRESS NOTES
Subjective     Patient ID: Darrian Velez is a 70 y.o. male.    Chief Complaint: Hypertension, Flank Pain, and Abdominal Pain    70 years old male came to the clinic with chronic right upper quadrant abdominal pain.  Patient is currently treated for possible nerve entrapment.  He is under pain management.  We discussed the possibility of low activity in the gallbladder.  No nausea or vomiting.  No fever or chills.  Patient with a BMI of 39 currently trying to lose weight.  Patient with 2 fasting blood sugars more than 126 which is diagnostic of diabetes.  Last A1c was normal.  CT scan with evidence of bladder wall thickening.  Last TSH was borderline.  Fatty liver noted.    Hypertension  This is a chronic problem. The current episode started more than 1 year ago. The problem is unchanged. The problem is controlled. Pertinent negatives include no peripheral edema. There are no associated agents to hypertension. Risk factors for coronary artery disease include male gender and obesity. Past treatments include angiotensin blockers and diuretics. The current treatment provides significant improvement. There is no history of angina. There is no history of a hypertension causing med or a thyroid problem.   Abdominal Pain  This is a chronic problem. The current episode started more than 1 month ago. The problem occurs intermittently. The problem has been gradually worsening. The pain is located in the RUQ. The pain is moderate. The quality of the pain is aching. The abdominal pain radiates to the RUQ. The pain is aggravated by palpation, certain positions and movement. The pain is relieved by Nothing. The treatment provided no relief. Prior diagnostic workup includes CT scan.     Review of Systems   Constitutional: Negative.    HENT: Negative.     Eyes: Negative.    Respiratory: Negative.     Cardiovascular: Negative.    Gastrointestinal:  Positive for abdominal pain. Negative for abdominal distention.   Genitourinary:  Negative.    Musculoskeletal: Negative.    Integumentary:  Negative.   Neurological: Negative.    Psychiatric/Behavioral: Negative.            Objective     Physical Exam  Vitals and nursing note reviewed.   Constitutional:       General: He is not in acute distress.     Appearance: He is well-developed. He is not diaphoretic.   HENT:      Head: Normocephalic and atraumatic.      Right Ear: External ear normal.      Left Ear: External ear normal.      Nose: Nose normal.      Mouth/Throat:      Pharynx: No oropharyngeal exudate.   Eyes:      General: No scleral icterus.        Right eye: No discharge.         Left eye: No discharge.      Conjunctiva/sclera: Conjunctivae normal.      Pupils: Pupils are equal, round, and reactive to light.   Neck:      Thyroid: No thyromegaly.      Vascular: No JVD.      Trachea: No tracheal deviation.   Cardiovascular:      Rate and Rhythm: Normal rate and regular rhythm.      Heart sounds: Normal heart sounds. No murmur heard.     No friction rub. No gallop.   Pulmonary:      Effort: Pulmonary effort is normal. No respiratory distress.      Breath sounds: Normal breath sounds. No stridor. No wheezing or rales.   Chest:      Chest wall: No tenderness.   Abdominal:      General: Bowel sounds are normal. There is no distension.      Palpations: Abdomen is soft. There is no mass.      Tenderness: There is no abdominal tenderness. There is no guarding or rebound.   Musculoskeletal:         General: No tenderness. Normal range of motion.      Cervical back: Normal range of motion and neck supple.   Lymphadenopathy:      Cervical: No cervical adenopathy.   Skin:     General: Skin is warm and dry.      Coloration: Skin is not pale.      Findings: No erythema or rash.   Neurological:      Mental Status: He is alert and oriented to person, place, and time.      Cranial Nerves: No cranial nerve deficit.      Motor: No abnormal muscle tone.      Coordination: Coordination normal.      Deep  Tendon Reflexes: Reflexes are normal and symmetric. Reflexes normal.   Psychiatric:         Behavior: Behavior normal.         Thought Content: Thought content normal.         Judgment: Judgment normal.            Assessment and Plan     1. Class 2 obesity with body mass index (BMI) of 39.0 to 39.9 in adult, unspecified obesity type, unspecified whether serious comorbidity present  -     Lipid Panel; Future; Expected date: 06/11/2025    2. Bladder wall thickening  -     Ambulatory referral/consult to Urology; Future; Expected date: 06/18/2025    3. Fatty liver    4. Liver cyst    5. Screen for colon cancer  -     Ambulatory referral/consult to Endo Procedure ; Future; Expected date: 06/12/2025    6. Primary hypertension    7. Upper abdominal pain, unspecified  -     NM Hepatobiliary Scan (HIDA); Future; Expected date: 06/11/2025    8. Type 2 diabetes mellitus with hyperglycemia, without long-term current use of insulin  -     tirzepatide (MOUNJARO) 5 mg/0.5 mL PnIj; Inject 5 mg into the skin every 7 days.  Dispense: 6 mL; Refill: 3  -     Microalbumin/Creatinine Ratio, Urine; Future; Expected date: 06/11/2025  -     Lipid Panel; Future; Expected date: 06/11/2025  -     Hemoglobin A1C; Future; Expected date: 06/11/2025  -     Comprehensive Metabolic Panel; Future; Expected date: 06/11/2025    9. Hypothyroidism, unspecified type  -     TSH; Future; Expected date: 06/11/2025    10. Type 2 diabetes mellitus without complication, without long-term current use of insulin    Continue monitoring blood pressure at home, low sodium diet.    Diet and physical activity to promote weight loss.   Continue monitoring blood sugar at home,ADA diet.          Follow up in about 6 months (around 12/11/2025), or if symptoms worsen or fail to improve.

## 2025-06-12 ENCOUNTER — CLINICAL SUPPORT (OUTPATIENT)
Dept: ENDOSCOPY | Facility: HOSPITAL | Age: 70
End: 2025-06-12
Attending: FAMILY MEDICINE
Payer: MEDICARE

## 2025-06-12 ENCOUNTER — TELEPHONE (OUTPATIENT)
Dept: ENDOSCOPY | Facility: HOSPITAL | Age: 70
End: 2025-06-12

## 2025-06-12 VITALS — WEIGHT: 250 LBS | BODY MASS INDEX: 39.24 KG/M2 | HEIGHT: 67 IN

## 2025-06-12 DIAGNOSIS — Z12.11 ENCOUNTER FOR SCREENING COLONOSCOPY: Primary | ICD-10-CM

## 2025-06-12 DIAGNOSIS — Z12.11 SCREEN FOR COLON CANCER: ICD-10-CM

## 2025-06-12 RX ORDER — SODIUM, POTASSIUM,MAG SULFATES 17.5-3.13G
1 SOLUTION, RECONSTITUTED, ORAL ORAL DAILY
Qty: 1 KIT | Refills: 0 | Status: SHIPPED | OUTPATIENT
Start: 2025-06-12 | End: 2025-06-14

## 2025-06-12 NOTE — PATIENT INSTRUCTIONS
.    Colonoscopy Procedure Prep Instructions      Date of procedure: 7/29/25 Arrive at: 8:30AM    Location of Department:   [facility] [address] [place] [address]   Stop in the hospital lobby on the 1st floor to get registered, then take the hospital elevators to the 2nd floor waiting room    As soon as possible:   your prep from pharmacy and over the counter DULCOLAX LAXATIVE TABLETS     On the day before your procedure   What You CAN do:   You may have clear liquids ONLY -see below for list.     Liquids That Are OK to Drink:   Water  Sports drinks (Gatorade, Power-Aid)  Coffee or tea (no cream or nondairy creamer)  Clear juices without pulp (apple, white grape)  Gelatin desserts (no fruit or toppings)  Clear soda (sprite, coke, ginger ale)  Chicken broth (until 12 midnight the night before procedure)      What You CANNOT do:   Do not EAT solid food, drink milk or anything   colored red.  Do not drink alcohol.  Do not take oral medications within 1 hour of starting   each dose of SUPREP.  No gum chewing or candy morning of procedure.      Note:   (Please disregard the insert instructions from pharmacy).  SUPREP Bowel Prep Kit is indicated for cleansing of the colon as a preparation for colonoscopy in adults.   Be sure to tell your doctor about all the medicines you take, including prescription and non-prescription medicines, vitamins, and herbal supplements. SUPREP Bowel Prep Kit may affect how other medicines work.  Medication taken by mouth may not be absorbed properly when taken within 1 hour before the start of each dose of SUPREP Bowel Prep Kit.    It is not uncommon to experience some abdominal cramping, nausea and/or vomiting when taking the prep. If you have nausea and/or vomiting while taking the prep, stop drinking for 20 to 30 minutes then continue.    How to take prep:    SUPREP Bowel Prep Kit is a (2-day) prep.   Both 6-ounce bottles are required for a complete  preparation for colonoscopy. Dilute the solution concentrate as directed prior to use. You must drink water with each dose of SUPREP, and additional water after each dose.    DOSE 1--Day Before Colonoscopy 7/28/25    Drink at least 6 to 8 glasses of clear liquids from time you wake up until you begin your prep and then continue until bedtime to avoid dehydration.     12:00 pm (NOON) Take four (4) Dulcolax (Bisacodyl) tablets with at least 8 ounces or more of clear liquids.      6:00 pm:    You must complete Steps 1 through 4 using one (1) 6-ounce bottle before going to bed as shown below:    Step 1-Pour ONE (1) 6-ounce bottle of SUPREP liquid into the mixing container.  Step 2-Add cool drinking water to the 16-ounce line on the container and mix.  Step 3-Drink ALL the liquid in the container.  Step 4-You must drink two (2) more 16-ounce containers of water over the next 1 hour.  IMPORTANT: If you experience preparation-related symptoms (for example, nausea, bloating, or cramping), stop, or slow the rate of drinking the additional water until your symptoms decrease.    DOSE 2--Day of the Colonoscopy 7/29/25 at 2-3 AM.    For this dose, repeat Steps 1 through 4 shown above using the other 6-ounce bottle.   You may continue drinking water/clear liquids until   4 hours before your colonoscopy or as directed by the scheduling nurse  5:30AM.    For information about your procedure, two (2) things to view prior to colonoscopy:  Please watch this informational video. It is important to watch this animated consent video prior to your arrival. If you haven't watched the video prior to arriving, you are required to watch it during admission which can causes delays.    Options for viewing:   Using a keyboard:  press and hold the control tab (Ctrl) and left mouse click to follow links.           Colonoscopy Instructional Video                                                                                   OR    Type link  address into your web browser's address bar:  [email or web address]      Educational Booklet with pictures:      Colonoscopy Prep - Liquid      Comments:   .     IMPORTANT INFORMATION TO KNOW BEFORE YOUR PROCEDURE    [facility] 2nd Floor         If your procedure requires the administration of anesthesia, it is necessary for a responsible adult to drive you home. (Medical Transportation, Uber, Lyft, Taxi, etc. may ONLY be used if a responsible adult is present to accompany you home.  The responsible adult CAN'T be the  of the service).      person must be available to return to pick you up within 15 minutes of being notified of discharge.       Please bring a picture ID, insurance card, & copayment      Take Medications as directed below:    If you are taking any injectable medication (s) for weight loss and/or diabetes  weekly, hold for 8 days prior to your scheduled procedure, please stop taking Mounjaro (Tirzepatide)}on 7/21/25. After the procedure, your provider will inform you  of when to resume injection.        If you begin taking any blood thinning medications, injectable weight loss/diabetes medications (other than insulin) , Adipex (Phentermine) , please contact the endoscopy scheduling department listed below as soon as possible.    If you are diabetic see the attached instruction sheet regarding your medication.     If you take HEART, BLOOD PRESSURE, SEIZURE, PAIN, LUNG (including inhalers/nebulizers), ANTI-REJECTION (transplant patients), or PSYCHIATRIC medications, please take at your regular times with a sip of water or as directed by the scheduling nurse.     Important contact information:    Endoscopy [phone] Hours of operation Monday-Friday 8:00-4:30pm.    Questions about insurance or financial obligations call [phone] or [phone].    If you have questions regarding the prep or need to reschedule, please call  [phone]. After hours questions requiring immediate assistance, contact [facility] nurse line at [phone] or [phone].   NOTE:     On occasion, unforeseen circumstances may cause a delay in your procedure start time. We respect your time and appreciate your patience during these circumstances.      Comments:       .  If you are unsure about any of these instructions, call [facility] at [phone].                                 Oral Medicine Week of Procedure Day of Prep   Day of Procedure            Glyburide       Do NOT take morning of procedure. If you        Glucotrol (Glipizide)   Do NOT take.   take twice daily, take with dinner.        Amaryl (Glimepiride)                                     Glucophage       Do NOT take morning of procedure. Take        Glumetza   Take as   when you start eating again.          Fortamet (Metformin)   prescribed.                                 Januvia (Sitaglipitin)       Do NOT take morning of procedure. Take        Nesina (Aloglipitin)       when you start eating again.          Onglyza (Saxaglipitin)   Take as                  Tradjenta (Linaglipitin)   prescribed.                                 Invokana (Canagliflozin)                      Farxiga (Dapagliflozin)       Do NOT take morning of procedure. Take        Jardiance(Empagliflozin) STOP 2 days before you start prep Do NOT take   when you start eating again.                         Actos (Pioglitazone)   Take as   Do NOT take morning of procedure. Take            prescribed.   when you start eating again.                          Injectable & Combination   Daily Dose   Weekly Dose            Medicine                      Adlyxin (Lixisenatide)   If you take these   For weekly dose, hold dose at least 8 days prior      Byetta (Exenatide)   medications daily   to appointment. You may take the dose after your      Bydureon (Exenatide XL)   on the day of your   procedure.             Ozempic (Semaglutide)   appointment hold                   Trulicity (Dulaglutide)   that dose until                   Victoza (Liraglutide)   after your                  Mounjaro (Tirzepatide)   procedure.                  Ronn Hernández                      It is important to monitor your blood sugar while doing the bowel preparation. On the day of your bowel prep, when you are on a clear liquid diet, you may drink beverages with sugar as your source of glucose. Be sure to mix the prep with water or sugar free liquid only. Below are instructions on how to adjust your diabetic medications prior to your scheduled procedure. Call the healthcare provider who manages your diabetes if you have questions.      Insulin for Type 1 Diabetes Mellitus   Day of Prep                                          Day of procedure            Basaglar If you use in the morning, take as prescribed.        If you take in the morning,          Lantus If you use in the evening, inject 70% of dose.       inject 80% of dose. If you take         Levemir           in the evenings, inject usual          Toujeo           dose.              Ruth Waldron Count carbs and adjust dose        Do NOT take morining of procedure.          Apidra accordingly. If not carb counting,        Take when you start eating again.          Humalog 100 take 25% of usual meal dose.                       Humalog 200 May use correction dose every                        Novolog 4 hours. Do NOT use once sugar-free                         liquid prep is started.                                         Insulin Pump SEE SEPARATE PUMP GUIDANCE                                                                                                                       Insulin for Type 2 Diabetes Mellitus   Day of Prep                                          Day of procedure             Basaglar If you use in the morning, take as prescribed.        If you take in the morning,          Lantus If you use in the evening, inject 70% of dose.       inject 80% of dose. If you take         Levemir           in the evenings, inject usual          Toujeo           dose.              Tresiba                                                                     Afrezza Inject 50 % of dose with clear liquid diet.        Do NOT take morning of          Apidra Do NOT use once sugar-free clear liquid prep       procedure. Take when you          Fiasp is started. If you are using a correction scale,        start eating meals again.          Humalog 100 take dose every 4 hours as needed.                        Humalog 200                         Novolog                                           NPH  Inject 50% of breakfast and dinner          Do NOT take morning of           doses with clear liquid diet.          procedure. Take usual dose                     when you eat dinner.                            Regular  Inject 50% of breakfast dose and do NOT take       Do NOT take morning of            dinner dose once sugar-free liquid prep has        procedure. Take usual dose            started. If using correction scale, may take dose       when you eat dinner.            every 6 hours as needed.                                          Novolog Mix 70/30 Inject 50% of breakfast dose. Inject 25%       Do NOT take breakfast dose.          Humolog Mix 75/25 of dinner dose.          Take usual dose when you eat          Humolog Mix 50/50           dinner.                                U500 Take 50% of AM or breakfast unit irasema dose.       Do NOT take mornining of            Take 25% of lunch or dinner or PM unit irasema dose.        procedure. Take when you                      start eating again.                              V-Go  Continue to wear your V-Go device. Do NOT click        Coninue to wear your V-Go            once sugar-free clear liquid prep is started.        device. Resume clicks with                      meals.                                                  Revised 3.4.24

## 2025-06-16 ENCOUNTER — PATIENT MESSAGE (OUTPATIENT)
Dept: UROLOGY | Facility: CLINIC | Age: 70
End: 2025-06-16
Payer: MEDICARE

## 2025-06-17 ENCOUNTER — CLINICAL SUPPORT (OUTPATIENT)
Dept: DIABETES | Facility: CLINIC | Age: 70
End: 2025-06-17
Payer: MEDICARE

## 2025-06-17 VITALS — HEIGHT: 67 IN | WEIGHT: 250.44 LBS | BODY MASS INDEX: 39.31 KG/M2

## 2025-06-17 DIAGNOSIS — E11.65 TYPE 2 DIABETES MELLITUS WITH HYPERGLYCEMIA, WITHOUT LONG-TERM CURRENT USE OF INSULIN: ICD-10-CM

## 2025-06-17 PROCEDURE — 99999 PR PBB SHADOW E&M-EST. PATIENT-LVL II: CPT | Mod: PBBFAC,,, | Performed by: DIETITIAN, REGISTERED

## 2025-06-17 PROCEDURE — G0108 DIAB MANAGE TRN  PER INDIV: HCPCS | Mod: S$GLB,,, | Performed by: DIETITIAN, REGISTERED

## 2025-06-17 NOTE — PROGRESS NOTES
"Diabetes Care Specialist Progress Note  Author: Alda Zaragoza RD  Date: 6/17/2025    Intake    Program Intake  Reason for Diabetes Program Visit:: Initial Diabetes Assessment  Current diabetes risk level:: low  In the last month, have you used the ER or been admitted to the hospital: No  Permission to speak with others about care:: no    Current Diabetes Treatment: DM Injectables  DM Injectables Type/Dose: Mounjaro 5mg QW    Continuous Glucose Monitoring  Patient has CGM: No    Lab Results   Component Value Date    HGBA1C 5.6 06/02/2025       Weight: 113.6 kg (250 lb 7.1 oz)   Height: 5' 7" (170.2 cm)   Body mass index is 39.22 kg/m².    Lifestyle Coping Support & Clinical    Lifestyle/Coping/Support  Does anyone in your family have diabetes or does anyone in your family support you in your diabetes care?: no family hx; gets support at home  List anything about Diabetes that causes you stress?: nothing really  How do you deal with stress/distress?: take care of things  Learning Barriers:: None  Culture or Islam beliefs that may impact ability to access healthcare: No  Psychosocial/Coping Skills Assessment Completed: : Yes  Assessment indicates:: Adequate understanding  Area of need?: No    Problem Review  Active Comorbidities: Obesity, Hypertension, Hyperlipidemia/Dyslipidemia    Diabetes Self-Management Skills Assessment    Medication Skills Assessment  Patient is able to identify current diabetes medications, dosages, and appropriate timing of medications.: yes  Patient reports problems or concerns with current medication regimen.: no  Patient is  aware that some diabetes medications can cause low blood sugar?: No  Medication Skills Assessment Completed:: Yes  Assessment indicates:: Adequate understanding  Area of need?: No    Diabetes Disease Process/Treatment Options  Diabetes Type?: Type II  When were you diagnosed?: two weeks ago  If previous diabetes education, when/where:: none  What are your goals for " this education session?: talk about food  Is patient aware of what causes diabetes?: Yes  Does patient understand the pathophysiology of diabetes?: No  Diabetes Disease Process/Treatment Options: Skills Assessment Completed: Yes  Assessment indicates:: Instruction Needed  Area of need?: Yes    Nutrition/Healthy Eating  Meal Plan 24 Hour Recall - Breakfast: coffee and milk, nutrigrain bar or sausage biscuit or belvita  Meal Plan 24 Hour Recall - Lunch: skips  Meal Plan 24 Hour Recall - Dinner: at 4 p.: bbq sausage, pasta salad or [place] or Martin Horner  Meal Plan 24 Hour Recall - Snack: chips, cashews, ice cream  Meal Plan 24 Hour Recall - Beverage: water. poppi  Who shops/cooks?: patient and wife  Patient can identify foods that impact blood sugar.: yes  Challenges to healthy eating:: portion control  Nutrition/Healthy Eating Skills Assessment Completed:: Yes  Assessment indicates:: Instruction Needed  Area of need?: Yes    Physical Activity/Exercise  Patient's daily activity level:: lightly active  Patient formally exercises outside of work.: no  Reasons for not exercising:: other (see comments) (no reason)  Patient can identify forms of physical activity.: yes  Physical Activity/Exercise Skills Assessment Completed: : Yes  Assessment indicates:: Instruction Needed  Area of need?: Yes    Home Blood Glucose Monitoring  Patient states that blood sugar is checked at home daily.: no  Home Blood Glucose Monitoring Skills Assessment Completed: : Yes  Assessment indicates:: Adequate understanding  Area of need?: No    Acute Complications  Acute Complications Skills Assessment Completed: : No  Deffered due to:: Time  Area of need?: Deferred    Chronic Complications  Chronic Complications Skills Assessment Completed: : No  Deferred due to:: Time  Area of need?: Deferred      Assessment Summary and Plan    Based on today's diabetes care assessment, the following areas of need were identified:      Identified Areas of  Need      Medication/Current Diabetes Treatment: No   Lifestyle Coping/Support: No   Diabetes Disease Process/Treatment Options: Yes, reviewed pathos of DM   Nutrition/Healthy Eating: Yes, see care plan below    Physical Activity/Exercise: Yes, reviewed effects of exercise on BG; taught duration, frequency and intensity recs for exercise. Pt was encouraged to get 30 minutes 5 days/week   Home Blood Glucose Monitoring: No    Acute Complications: Deferred    Chronic Complications: Deferred       Today's interventions were provided through individual discussion, instruction, and written materials were provided.      Patient verbalized understanding of instruction and written materials.  Pt was able to return back demonstration of instructions today. Patient understood key points, needs reinforcement and further instruction.     Diabetes Self-Management Care Plan:    Today's Diabetes Self-Management Care Plan was developed with Darrian's input. Darrian has agreed to work toward the following goal(s) to improve his/her overall diabetes control.      Care Plan: Diabetes Management   Updates made since 6/17/2024 12:00 AM        Problem: Healthy Eating         Goal: Eat 3 meals daily with 30-45g/2-3 servings of Carbohydrate per meal.    Start Date: 6/17/2025   Expected End Date: 6/17/2026   Priority: Medium   Barriers: No Barriers Identified   Note:    6/17/25: Pt eats 2 meals/day- breakfast and dinner around 4 p.m. He started Mounjaro recently. Discussed how to be successful while taking Mounjaro and keeping the weight off. He was encouraged to eat more vegetables, eat less fatty meats. He does not drink sugary beverages.       Task: Reviewed the sources and role of Carbohydrate, Protein, and Fat and how each nutrient impacts blood sugar. Completed 6/17/2025        Task: Provided visual examples using dry measuring cups, food models, and other familiar objects such as computer mouse, deck or cards, tennis ball etc. to help with  visualization of portions. Completed 6/17/2025        Task: Explained how to count carbohydrates using the food label and the use of dry measuring cups for accurate carb counting. Completed 6/17/2025        Task: Discussed strategies for choosing healthier menu options when dining out. Completed 6/17/2025     Task: Review the importance of balancing carbohydrates with each meal using portion control techniques to count servings of carbohydrate and label reading to identify serving size and amount of total carbs per serving. Completed 6/17/2025        Task: Provided Sample plate method and reviewed the use of the plate to estimate amounts of carbohydrate per meal. Completed 6/17/2025          Follow Up Plan     Follow up in about 3 months (around 9/17/2025). Review weight, new labs, dietary changes.    Today's care plan and follow up schedule was discussed with patient.  Darrian verbalized understanding of the care plan, goals, and agrees to follow up plan.        The patient was encouraged to communicate with his/her health care provider/physician and care team regarding his/her condition(s) and treatment.  I provided the patient with my contact information today and encouraged to contact me via phone or [facility] Portal as needed.     Length of Visit   Total Time: 60 Minutes

## 2025-06-30 ENCOUNTER — HOSPITAL ENCOUNTER (OUTPATIENT)
Dept: RADIOLOGY | Facility: HOSPITAL | Age: 70
Discharge: HOME | End: 2025-06-30
Attending: FAMILY MEDICINE
Payer: MEDICARE

## 2025-06-30 DIAGNOSIS — R10.10 UPPER ABDOMINAL PAIN, UNSPECIFIED: ICD-10-CM

## 2025-06-30 PROCEDURE — 63600175 PHARM REV CODE 636 W HCPCS: Performed by: FAMILY MEDICINE

## 2025-06-30 PROCEDURE — 78227 HEPATOBIL SYST IMAGE W/DRUG: CPT | Mod: TC

## 2025-06-30 PROCEDURE — 78227 HEPATOBIL SYST IMAGE W/DRUG: CPT | Mod: 26,,, | Performed by: NUCLEAR MEDICINE

## 2025-06-30 PROCEDURE — A9537 TC99M MEBROFENIN: HCPCS | Performed by: FAMILY MEDICINE

## 2025-06-30 RX ORDER — KIT FOR THE PREPARATION OF TECHNETIUM TC 99M MEBROFENIN 45 MG/10ML
5.6 INJECTION, POWDER, LYOPHILIZED, FOR SOLUTION INTRAVENOUS
Status: COMPLETED | OUTPATIENT
Start: 2025-06-30 | End: 2025-06-30

## 2025-06-30 RX ORDER — SINCALIDE 5 UG/5ML
0.02 INJECTION, POWDER, LYOPHILIZED, FOR SOLUTION INTRAVENOUS ONCE
Status: COMPLETED | OUTPATIENT
Start: 2025-06-30 | End: 2025-06-30

## 2025-06-30 RX ADMIN — KIT FOR THE PREPARATION OF TECHNETIUM TC 99M MEBROFENIN 5.6 MILLICURIE: 45 INJECTION, POWDER, LYOPHILIZED, FOR SOLUTION INTRAVENOUS at 07:06

## 2025-06-30 RX ADMIN — SINCALIDE 2.3 MCG: 5 INJECTION, POWDER, LYOPHILIZED, FOR SOLUTION INTRAVENOUS at 09:06

## 2025-07-01 ENCOUNTER — RESULTS FOLLOW-UP (OUTPATIENT)
Dept: FAMILY MEDICINE | Facility: CLINIC | Age: 70
End: 2025-07-01

## 2025-07-01 ENCOUNTER — OFFICE VISIT (OUTPATIENT)
Dept: UROLOGY | Facility: CLINIC | Age: 70
End: 2025-07-01
Payer: MEDICARE

## 2025-07-01 VITALS
SYSTOLIC BLOOD PRESSURE: 130 MMHG | HEART RATE: 86 BPM | WEIGHT: 239.06 LBS | DIASTOLIC BLOOD PRESSURE: 81 MMHG | BODY MASS INDEX: 37.52 KG/M2 | HEIGHT: 67 IN

## 2025-07-01 DIAGNOSIS — N32.89 BLADDER WALL THICKENING: Primary | ICD-10-CM

## 2025-07-01 DIAGNOSIS — R35.0 BENIGN PROSTATIC HYPERPLASIA WITH URINARY FREQUENCY: ICD-10-CM

## 2025-07-01 DIAGNOSIS — N40.1 BENIGN PROSTATIC HYPERPLASIA WITH URINARY FREQUENCY: ICD-10-CM

## 2025-07-01 LAB — POC RESIDUAL URINE VOLUME: 84 ML (ref 0–100)

## 2025-07-01 PROCEDURE — 1159F MED LIST DOCD IN RCRD: CPT | Mod: CPTII,S$GLB,,

## 2025-07-01 PROCEDURE — 3008F BODY MASS INDEX DOCD: CPT | Mod: CPTII,S$GLB,,

## 2025-07-01 PROCEDURE — 99202 OFFICE O/P NEW SF 15 MIN: CPT | Mod: S$GLB,,,

## 2025-07-01 PROCEDURE — 51798 US URINE CAPACITY MEASURE: CPT | Mod: S$GLB,,,

## 2025-07-01 PROCEDURE — 3075F SYST BP GE 130 - 139MM HG: CPT | Mod: CPTII,S$GLB,,

## 2025-07-01 PROCEDURE — 1125F AMNT PAIN NOTED PAIN PRSNT: CPT | Mod: CPTII,S$GLB,,

## 2025-07-01 PROCEDURE — 1160F RVW MEDS BY RX/DR IN RCRD: CPT | Mod: CPTII,S$GLB,,

## 2025-07-01 PROCEDURE — 3044F HG A1C LEVEL LT 7.0%: CPT | Mod: CPTII,S$GLB,,

## 2025-07-01 PROCEDURE — 3079F DIAST BP 80-89 MM HG: CPT | Mod: CPTII,S$GLB,,

## 2025-07-01 PROCEDURE — 99999 PR PBB SHADOW E&M-EST. PATIENT-LVL IV: CPT | Mod: PBBFAC,,,

## 2025-07-01 NOTE — PROGRESS NOTES
Subjective:       Patient ID: Darrian Velez is a 70 y.o. male.    Chief Complaint: bladder wall thickening     This is a 70 y.o.  male patient that is new to me.  The patient was referred to me by Dr. Alvarado for thickening of bladder wall.  CT of abdomen and pelvis w/wo contrast 5/2025-- 1.8 cm exophytic right renal cyst. No nephrolithiasis. No hydronephrosis or hydroureter. Circumferential bladder wall thickening.  Reports urinary frequency, urgency, nocturia x1. Denies dysuria, gross hematuria, weak stream, straining to urinate. Reports history of kidney stones as well as UTIs. He has always passed kidney stones on his own. LUTS is not bothersome to him.   PVR 84ml, last void was 1 hour prior.      LAST PSA  Lab Results   Component Value Date    PSA 0.17 06/02/2025    PSA 0.21 11/27/2023    PSA 0.18 06/06/2022    PSA 1.2 01/26/2021    PSA 0.17 09/27/2019    PSA 0.17 07/11/2012    PSA 0.18 01/07/2009    PSA 0.2 10/11/2007       Lab Results   Component Value Date    CREATININE 1.1 06/02/2025       ---  PMH/PSH/Medications/Allergies/Social history reviewed and as in chart.    Review of Systems   Constitutional:  Negative for activity change, chills and fever.   Respiratory:  Negative for shortness of breath.    Cardiovascular:  Negative for chest pain and palpitations.   Gastrointestinal:  Negative for abdominal pain and constipation.   Genitourinary:  Positive for frequency and urgency. Negative for difficulty urinating, dysuria, flank pain and hematuria.   Neurological:  Negative for dizziness and light-headedness.       Objective:      Physical Exam  HENT:      Head: Normocephalic.   Pulmonary:      Effort: Pulmonary effort is normal.   Musculoskeletal:         General: Normal range of motion.      Cervical back: Normal range of motion.   Skin:     General: Skin is warm and dry.   Neurological:      Mental Status: He is alert and oriented to person, place, and time.         Assessment:     Problem Noted    Bladder Wall Thickening 7/1/2025   Benign Prostatic Hyperplasia With Urinary Frequency 7/1/2025       Plan:     Reviewed CT scan.   Briefly discussed starting medication such as an alpha blocker if LUTS becomes bothersome. Patient wishes to hold off for now.  Cystoscopy scheduled to further evaluate bladder wall thickening.  Follow-up PRN for problems and concerns    HOSSEIN Chavez    I spent a total of 25 minutes on the day of the visit.This includes face to face time and non-face to face time preparing to see the patient (eg, review of tests), obtaining and/or reviewing separately obtained history, documenting clinical information in the electronic or other health record, independently interpreting results and communicating results to the patient/family/caregiver, or care coordinator.

## 2025-07-18 ENCOUNTER — TELEPHONE (OUTPATIENT)
Dept: OTOLARYNGOLOGY | Facility: CLINIC | Age: 70
End: 2025-07-18
Payer: MEDICARE

## 2025-07-21 ENCOUNTER — PROCEDURE VISIT (OUTPATIENT)
Dept: UROLOGY | Facility: CLINIC | Age: 70
End: 2025-07-21
Payer: MEDICARE

## 2025-07-21 VITALS
HEART RATE: 89 BPM | DIASTOLIC BLOOD PRESSURE: 79 MMHG | SYSTOLIC BLOOD PRESSURE: 130 MMHG | WEIGHT: 230.5 LBS | BODY MASS INDEX: 36.18 KG/M2 | HEIGHT: 67 IN

## 2025-07-21 DIAGNOSIS — N32.89 BLADDER WALL THICKENING: ICD-10-CM

## 2025-07-21 PROCEDURE — 52000 CYSTOURETHROSCOPY: CPT | Mod: S$GLB,,, | Performed by: UROLOGY

## 2025-07-21 PROCEDURE — 99499 UNLISTED E&M SERVICE: CPT | Mod: S$GLB,,, | Performed by: UROLOGY

## 2025-07-21 NOTE — PROCEDURES
Cystoscopy Procedure Note    Procedure:   Flexible cysto-uretheroscopy    Pre Procedure Diagnosis:  LUTS    Post Procedure Diagnosis:  LUTS    Surgeon: Elliott Hernandez MD     Anesthesia: 2% uro-jet lidocaine jelly for local analgesia    Procedure note:  A flexible cysto-urethroscopy was performed after consent was obtained. Prior to procedure a timeout was performed and the correct patient, procedure, and site was verified  The risks and benefits were explained. 2% lidocaine urojet was used for local analgesia. The genitalia was prepped and draped in the sterile fashion.     The flexible scope was advanced into the urethra and into the bladder. There were no urethral strictures noted throughout the course of the urethra. during scope passage.     The bladder was completely surveyed in a systematic fashion. The bilateral ureteral orifices were identified in their normal orthotopic locations with efflux noted bilaterally. The remained of the bladder was evaluated. No bladder tumors or lesions suspicious for malignancy were seen.     Upon retroflexion there was no significant median lobe enlargement. As the flexible cystoscope was being withdrawn, the prostate was evaluated carefully. The lateral lobes of the prostate were mildly enlarged.     The patient tolerated the procedure well without complication.     Plan:  - Patient has mild hyperplasia, is not particularly bothered at this time  - Can see me back as needed    Elliott Hernandez MD  Urology  [facility] [place]

## 2025-07-23 ENCOUNTER — LAB VISIT (OUTPATIENT)
Dept: LAB | Facility: HOSPITAL | Age: 70
End: 2025-07-23
Attending: FAMILY MEDICINE
Payer: MEDICARE

## 2025-07-23 DIAGNOSIS — E03.9 HYPOTHYROIDISM, UNSPECIFIED TYPE: ICD-10-CM

## 2025-07-23 LAB — TSH SERPL-ACNC: 3.29 UIU/ML (ref 0.4–4)

## 2025-07-23 PROCEDURE — 84443 ASSAY THYROID STIM HORMONE: CPT

## 2025-07-23 PROCEDURE — 36415 COLL VENOUS BLD VENIPUNCTURE: CPT | Mod: PO

## 2025-07-25 ENCOUNTER — TELEPHONE (OUTPATIENT)
Dept: ENDOSCOPY | Facility: HOSPITAL | Age: 70
End: 2025-07-25
Payer: MEDICARE

## 2025-07-25 NOTE — TELEPHONE ENCOUNTER
Spoke w/patient and confirmed procedure appt for 7/29/25 and arrival time of 0830.  All questions answered.

## 2025-07-29 ENCOUNTER — HOSPITAL ENCOUNTER (OUTPATIENT)
Facility: HOSPITAL | Age: 70
Discharge: HOME | End: 2025-07-29
Attending: INTERNAL MEDICINE | Admitting: INTERNAL MEDICINE
Payer: MEDICARE

## 2025-07-29 ENCOUNTER — ANESTHESIA EVENT (OUTPATIENT)
Dept: ENDOSCOPY | Facility: HOSPITAL | Age: 70
End: 2025-07-29
Payer: MEDICARE

## 2025-07-29 ENCOUNTER — ANESTHESIA (OUTPATIENT)
Dept: ENDOSCOPY | Facility: HOSPITAL | Age: 70
End: 2025-07-29
Payer: MEDICARE

## 2025-07-29 VITALS
HEART RATE: 68 BPM | HEIGHT: 67 IN | DIASTOLIC BLOOD PRESSURE: 68 MMHG | TEMPERATURE: 98 F | RESPIRATION RATE: 10 BRPM | SYSTOLIC BLOOD PRESSURE: 123 MMHG | WEIGHT: 230 LBS | OXYGEN SATURATION: 97 % | BODY MASS INDEX: 36.1 KG/M2

## 2025-07-29 DIAGNOSIS — Z12.11 SCREEN FOR COLON CANCER: ICD-10-CM

## 2025-07-29 DIAGNOSIS — K63.5 POLYP OF COLON, UNSPECIFIED PART OF COLON, UNSPECIFIED TYPE: Primary | ICD-10-CM

## 2025-07-29 LAB — POCT GLUCOSE: 104 MG/DL (ref 70–110)

## 2025-07-29 PROCEDURE — 27201012 HC FORCEPS, HOT/COLD, DISP: Performed by: INTERNAL MEDICINE

## 2025-07-29 PROCEDURE — 37000009 HC ANESTHESIA EA ADD 15 MINS: Performed by: INTERNAL MEDICINE

## 2025-07-29 PROCEDURE — 25000003 PHARM REV CODE 250: Performed by: NURSE ANESTHETIST, CERTIFIED REGISTERED

## 2025-07-29 PROCEDURE — 88305 TISSUE EXAM BY PATHOLOGIST: CPT | Mod: 26,,, | Performed by: PATHOLOGY

## 2025-07-29 PROCEDURE — 27201089 HC SNARE, DISP (ANY): Performed by: INTERNAL MEDICINE

## 2025-07-29 PROCEDURE — 45380 COLONOSCOPY AND BIOPSY: CPT | Mod: XS,PT | Performed by: INTERNAL MEDICINE

## 2025-07-29 PROCEDURE — 63600175 PHARM REV CODE 636 W HCPCS: Performed by: NURSE ANESTHETIST, CERTIFIED REGISTERED

## 2025-07-29 PROCEDURE — 37000008 HC ANESTHESIA 1ST 15 MINUTES: Performed by: INTERNAL MEDICINE

## 2025-07-29 PROCEDURE — 45385 COLONOSCOPY W/LESION REMOVAL: CPT | Mod: PT | Performed by: INTERNAL MEDICINE

## 2025-07-29 PROCEDURE — 88305 TISSUE EXAM BY PATHOLOGIST: CPT | Mod: TC,91 | Performed by: INTERNAL MEDICINE

## 2025-07-29 RX ORDER — LIDOCAINE HYDROCHLORIDE 20 MG/ML
INJECTION INTRAVENOUS
Status: DISCONTINUED | OUTPATIENT
Start: 2025-07-29 | End: 2025-07-29

## 2025-07-29 RX ORDER — PROPOFOL 10 MG/ML
VIAL (ML) INTRAVENOUS CONTINUOUS PRN
Status: DISCONTINUED | OUTPATIENT
Start: 2025-07-29 | End: 2025-07-29

## 2025-07-29 RX ORDER — PROPOFOL 10 MG/ML
VIAL (ML) INTRAVENOUS
Status: DISCONTINUED | OUTPATIENT
Start: 2025-07-29 | End: 2025-07-29

## 2025-07-29 RX ADMIN — LIDOCAINE HYDROCHLORIDE 80 MG: 20 INJECTION, SOLUTION INTRAVENOUS at 10:07

## 2025-07-29 RX ADMIN — PROPOFOL 70 MG: 10 INJECTION, EMULSION INTRAVENOUS at 10:07

## 2025-07-29 RX ADMIN — PROPOFOL 150 MCG/KG/MIN: 10 INJECTION, EMULSION INTRAVENOUS at 10:07

## 2025-07-29 RX ADMIN — PROPOFOL 30 MG: 10 INJECTION, EMULSION INTRAVENOUS at 10:07

## 2025-07-29 RX ADMIN — SODIUM CHLORIDE: 0.9 INJECTION, SOLUTION INTRAVENOUS at 09:07

## 2025-07-29 NOTE — ANESTHESIA POSTPROCEDURE EVALUATION
Anesthesia Post Evaluation    Patient: Darrian Velez    Procedure(s) Performed: Procedure(s) (LRB):  COLONOSCOPY, SCREENING, LOW RISK PATIENT (N/A)    Final Anesthesia Type: general      Patient location during evaluation: PACU  Patient participation: Yes- Able to Participate  Level of consciousness: awake and alert  Post-procedure vital signs: reviewed and stable  Pain management: adequate  Airway patency: patent    PONV status at discharge: No PONV  Anesthetic complications: no      Cardiovascular status: blood pressure returned to baseline  Respiratory status: unassisted  Hydration status: euvolemic            Vitals Value Taken Time   /68 07/29/25 11:30   Temp 36.6 °C (97.9 °F) 07/29/25 11:00   Pulse 68 07/29/25 11:30   Resp 10 07/29/25 11:30   SpO2 97 % 07/29/25 11:30         Event Time   Out of Recovery 11:40:56         Pain/Kim Score: No data recorded

## 2025-07-29 NOTE — ANESTHESIA PREPROCEDURE EVALUATION
07/29/2025  Darrian Velez is a 70 y.o., male.      Pre-op Assessment    I have reviewed the Patient Summary Reports.     I have reviewed the Nursing Notes. I have reviewed the NPO Status.      Review of Systems  Anesthesia Hx:               Denies Personal Hx of Anesthesia complications.                    Cardiovascular:     Hypertension                                    Hypertension         Renal/:  Chronic Renal Disease        Kidney Function/Disease             Endocrine:  Diabetes    Diabetes                        Physical Exam  General: Well nourished    Airway:  Mallampati: II   Mouth Opening: Normal    Anesthesia Plan  Type of Anesthesia, risks & benefits discussed:    Anesthesia Type: Gen Natural Airway  Informed Consent: Informed consent signed with the Patient and all parties understand the risks and agree with anesthesia plan.  All questions answered.   ASA Score: 3    Ready For Surgery From Anesthesia Perspective.   .

## 2025-07-29 NOTE — PROVATION PATIENT INSTRUCTIONS
Discharge Summary/Instructions after an Endoscopic Procedure  Patient Name: Darrian Velez  Patient MRN: [id number]  Patient YOB: 1955 Tuesday, July 29, 2025  Checo Cazares MD  Dear patient,  As a result of recent federal legislation (The Federal Cures Act), you may   receive lab or pathology results from your procedure in your [facility]   account before your physician is able to contact you. Your physician or   their representative will relay the results to you with their   recommendations at their soonest availability.  Thank you,  Your health is very important to us during the Covid Crisis. Following your   procedure today, you will receive a daily text for 2 weeks asking about   signs or symptoms of Covid 19.  Please respond to this text when you   receive it so we can follow up and keep you as safe as possible.   RESTRICTIONS:  During your procedure today, you received medications for sedation.  These   medications may affect your judgment, balance and coordination.  Therefore,   for 24 hours, you have the following restrictions:   - DO NOT drive a car, operate machinery, make legal/financial decisions,   sign important papers or drink alcohol.    ACTIVITY:  Today: no heavy lifting, straining or running due to procedural   sedation/anesthesia.  The following day: return to full activity including work.  DIET:  Eat and drink normally unless instructed otherwise.     TREATMENT FOR COMMON SIDE EFFECTS:  - Mild abdominal pain, nausea, belching, bloating or excessive gas:  rest,   eat lightly and use a heating pad.  - Sore Throat: treat with throat lozenges and/or gargle with warm salt   water.  - Because air was used during the procedure, expelling large amounts of air   from your rectum or belching is normal.  - If a bowel prep was taken, you may not have a bowel movement for 1-3 days.    This is normal.  SYMPTOMS TO WATCH FOR AND REPORT TO YOUR PHYSICIAN:  1. Abdominal pain or bloating, other than gas  cramps.  2. Chest pain.  3. Back pain.  4. Signs of infection such as: chills or fever occurring within 24 hours   after the procedure.  5. Rectal bleeding, which would show as bright red, maroon, or black stools.   (A tablespoon of blood from the rectum is not serious, especially if   hemorrhoids are present.)  6. Vomiting.  7. Weakness or dizziness.  GO DIRECTLY TO THE NEAREST EMERGENCY ROOM IF YOU HAVE ANY OF THE FOLLOWING:      Difficulty breathing              Chills and/or fever over 101 F   Persistent vomiting and/or vomiting blood   Severe abdominal pain   Severe chest pain   Black, tarry stools   Bleeding- more than one tablespoon   Any other symptom or condition that you feel may need urgent attention  Your doctor recommends these additional instructions:  If any biopsies were taken, your doctors clinic will contact you in 1 to 2   weeks with any results.  - Discharge to home  - Await pathology results   - Recommend repeat surveillance colonoscopy in 5 years in 2030.   - Resume previous diet and medications.  - Condition stable   - The signs and symptoms of potential delayed complications were discussed   with the patient. If signs or symptoms of these complications develop, call   the [facility] at [phone].   - Return to normal activities tomorrow.  Written discharge instructions were   provided to the patient.   For questions, problems or results please call your physician - Checo Cazares MD.  EMERGENCY PHONE NUMBER: [phone],  LAB RESULTS: [phone]  IF A COMPLICATION OR EMERGENCY SITUATION ARISES AND YOU ARE UNABLE TO REACH   YOUR PHYSICIAN - GO DIRECTLY TO THE EMERGENCY ROOM.  MD Checo Valdez MD  7/29/2025 1:01:55 PM  This report has been verified and signed electronically.  Dear patient,  As a result of recent federal legislation (The Federal Cures Act), you may   receive lab or pathology results from your procedure in your [facility]   account  before your physician is able to contact you. Your physician or   their representative will relay the results to you with their   recommendations at their soonest availability.  Thank you,  PROVATION

## 2025-07-29 NOTE — H&P
[facility]    CC: hx of polyps    HPI 70 y.o. male with 3mm descending colon polyp 2020, given 5 yr recommended repeat interval. No FH of CRC in FDR    Past Medical History  Past Medical History:   Diagnosis Date    ALLERGIC RHINITIS     Hx of colonic polyps 04/05/2012    Tubular adenoma    Hyperlipidemia     Hypertension     Kidney stone     MRSA infection     left medial thigh abscess & cellulitis    Personal history of kidney stones     Urinary tract infection          Physical Examination  There were no vitals taken for this visit.  General appearance: alert, cooperative, no distress  Lungs: clear to auscultation bilaterally, no dullness to percussion bilaterally  Heart: regular rate and rhythm without rub; no displacement of the PMI   Abdomen: soft, non-tender; bowel sounds normoactive; no organomegaly      Assessment: 70 y.o. male with 3mm descending colon polyp 2020, given 5 yr recommended repeat interval. No FH of CRC in FDR    Plan:  -Colonoscopy today    Checo Cazares MD   [address] [place] [address], [address]  [place] [address] [phone]

## 2025-07-29 NOTE — TRANSFER OF CARE
"Anesthesia Transfer of Care Note    Patient: Darrian Velez    Procedure(s) Performed: Procedure(s) (LRB):  COLONOSCOPY, SCREENING, LOW RISK PATIENT (N/A)    Patient location: GI    Anesthesia Type: general    Transport from OR: Transported from OR on room air with adequate spontaneous ventilation    Post pain: adequate analgesia    Post assessment: no apparent anesthetic complications    Post vital signs: stable    Level of consciousness: awake and lethargic    Nausea/Vomiting: no nausea/vomiting    Complications: none    Transfer of care protocol was followed      Last vitals: Visit Vitals  BP (!) 148/73 (BP Location: Left arm, Patient Position: Lying)   Pulse 78   Temp 36.6 °C (97.9 °F) (Skin)   Resp 18   Ht 5' 7" (1.702 m)   Wt 104.3 kg (230 lb)   SpO2 97%   BMI 36.02 kg/m²     "

## 2025-07-31 LAB
ESTROGEN SERPL-MCNC: NORMAL PG/ML
INSULIN SERPL-ACNC: NORMAL U[IU]/ML
LAB AP CLINICAL INFORMATION: NORMAL
LAB AP GROSS DESCRIPTION: NORMAL
LAB AP PERFORMING LOCATION(S): NORMAL
LAB AP REPORT FOOTNOTES: NORMAL

## 2025-08-06 ENCOUNTER — OFFICE VISIT (OUTPATIENT)
Dept: FAMILY MEDICINE | Facility: CLINIC | Age: 70
End: 2025-08-06
Payer: MEDICARE

## 2025-08-06 VITALS
BODY MASS INDEX: 35.22 KG/M2 | HEART RATE: 85 BPM | OXYGEN SATURATION: 98 % | WEIGHT: 224.44 LBS | HEIGHT: 67 IN | SYSTOLIC BLOOD PRESSURE: 122 MMHG | DIASTOLIC BLOOD PRESSURE: 72 MMHG

## 2025-08-06 DIAGNOSIS — E78.5 HYPERLIPIDEMIA, UNSPECIFIED HYPERLIPIDEMIA TYPE: ICD-10-CM

## 2025-08-06 DIAGNOSIS — E11.9 TYPE 2 DIABETES MELLITUS WITHOUT COMPLICATION, WITHOUT LONG-TERM CURRENT USE OF INSULIN: ICD-10-CM

## 2025-08-06 DIAGNOSIS — Z00.00 ENCOUNTER FOR MEDICARE ANNUAL WELLNESS EXAM: Primary | ICD-10-CM

## 2025-08-06 DIAGNOSIS — I10 PRIMARY HYPERTENSION: ICD-10-CM

## 2025-08-06 PROBLEM — K63.5 POLYP OF COLON: Status: ACTIVE | Noted: 2025-08-06

## 2025-08-06 PROBLEM — Z12.11 SCREEN FOR COLON CANCER: Status: ACTIVE | Noted: 2025-08-06

## 2025-08-06 PROCEDURE — 1170F FXNL STATUS ASSESSED: CPT | Mod: CPTII,S$GLB,,

## 2025-08-06 PROCEDURE — G0439 PPPS, SUBSEQ VISIT: HCPCS | Mod: S$GLB,,,

## 2025-08-06 PROCEDURE — 1159F MED LIST DOCD IN RCRD: CPT | Mod: CPTII,S$GLB,,

## 2025-08-06 PROCEDURE — 1160F RVW MEDS BY RX/DR IN RCRD: CPT | Mod: CPTII,S$GLB,,

## 2025-08-06 PROCEDURE — 1101F PT FALLS ASSESS-DOCD LE1/YR: CPT | Mod: CPTII,S$GLB,,

## 2025-08-06 PROCEDURE — 99999 PR PBB SHADOW E&M-EST. PATIENT-LVL V: CPT | Mod: PBBFAC,,,

## 2025-08-06 PROCEDURE — 3044F HG A1C LEVEL LT 7.0%: CPT | Mod: CPTII,S$GLB,,

## 2025-08-06 PROCEDURE — 3074F SYST BP LT 130 MM HG: CPT | Mod: CPTII,S$GLB,,

## 2025-08-06 PROCEDURE — 3078F DIAST BP <80 MM HG: CPT | Mod: CPTII,S$GLB,,

## 2025-08-06 PROCEDURE — 1126F AMNT PAIN NOTED NONE PRSNT: CPT | Mod: CPTII,S$GLB,,

## 2025-08-06 PROCEDURE — 1158F ADVNC CARE PLAN TLK DOCD: CPT | Mod: CPTII,S$GLB,,

## 2025-08-06 PROCEDURE — 3288F FALL RISK ASSESSMENT DOCD: CPT | Mod: CPTII,S$GLB,,

## 2025-08-06 NOTE — PROGRESS NOTES
"Darrian Velez presented for a  Medicare AWV and comprehensive Health Risk Assessment today. The following components were reviewed and updated:    Medical history  Family History  Social history  Allergies and Current Medications  Health Risk Assessment  Health Maintenance  Care Team         ** See Completed Assessments for Annual Wellness Visit within the encounter summary.**         The following assessments were completed:  Living Situation  CAGE  Depression Screening  Timed Get Up and Go  Whisper Test  Cognitive Function Screening    Nutrition Screening  ADL Screening  PAQ Screening      Opioid documentation for eAWV      Patient does not have a current opioid prescription.        Review for Substance Use Disorders: Patient does not use substance      Current Medications[1]       Vitals:    08/06/25 0938   BP: 122/72   Pulse: 85   SpO2: 98%   Weight: 101.8 kg (224 lb 6.9 oz)   Height: 5' 7" (1.702 m)   PainSc: 0-No pain      Physical Exam  Vitals and nursing note reviewed.   Constitutional:       Appearance: He is obese.   HENT:      Head: Normocephalic and atraumatic.      Nose: Nose normal.   Eyes:      Conjunctiva/sclera: Conjunctivae normal.   Cardiovascular:      Rate and Rhythm: Normal rate.   Pulmonary:      Effort: Pulmonary effort is normal.   Musculoskeletal:         General: Normal range of motion.      Cervical back: Normal range of motion.   Skin:     General: Skin is warm.   Neurological:      Mental Status: He is alert. Mental status is at baseline.   Psychiatric:         Mood and Affect: Mood normal.               Diagnoses and health risks identified today and associated recommendations/orders:    1. Encounter for Medicare annual wellness exam  - Referral to Enhanced Annual Wellness Visit (eAWV) M+6  - Chart reviewed. Problem list updated. Discussed current medical diagnosis, current medications, medical/surgical/family/social history; updated provider list; documented vital signs; identified " any cognitive impairment; and updated risk factor list. Addressed any outstanding health maintenance. Provided patient with personalized health advice. Continue to follow up with PCP and any specialists.    2. Type 2 diabetes mellitus without complication, without long-term current use of insulin  Historical diagnosis being followed by PCP.  Continue taking Mounjaro as as prescribed.  3. Primary hypertension  Chronic stable patient is a patient taking losartan and hydrochlorothiazide follow up with PCP  4. Hyperlipidemia, unspecified hyperlipidemia type  Chronic triglycerides were elevated on previous exam patient taking a statin being followed by his PCP  5. Body mass index (BMI) of 35.0 to 35.9 in adult   Recommendation weight loss ,healthy diet, and increasing exercise as tolerated with goal of 150min/week.      Assessment & Plan               Provided Dariran with a 5-10 year written screening schedule and personal prevention plan. Recommendations were developed using the USPSTF age appropriate recommendations. Education, counseling, and referrals were provided as needed. After Visit Summary printed and given to patient which includes a list of additional screenings\tests needed.    Follow up in about 1 year (around 8/6/2026) for w/ Vincent Mar PA-C for, Your next medicare wellness visit.    YANICK Beltrán    Advance Care Planning       I offered to discuss advanced care planning, including how to pick a person who would make decisions for you if you were unable to make them for yourself, called a health care power of , and what kind of decisions you might make such as use of life sustaining treatments such as ventilators and tube feeding when faced with a life limiting illness recorded on a living will that they will need to know. (How you want to be cared for as you near the end of your natural life)     X  Patient has advanced directives on file, which we reviewed, and they do not wish to make  changes.         [1]   Current Outpatient Medications:     atorvastatin (LIPITOR) 10 MG tablet, Take 1 tablet (10 mg total) by mouth every evening., Disp: 90 tablet, Rfl: 3    fenofibrate micronized (LOFIBRA) 134 MG Cap, Take 1 capsule (134 mg total) by mouth daily with breakfast., Disp: 90 capsule, Rfl: 3    losartan-hydrochlorothiazide 100-12.5 mg (HYZAAR) 100-12.5 mg Tab, Take 1 tablet by mouth once daily., Disp: 90 tablet, Rfl: 3    potassium chloride SA (K-DUR,KLOR-CON) 20 MEQ tablet, Take 1 tablet (20 mEq total) by mouth once daily., Disp: 90 tablet, Rfl: 3    tirzepatide (MOUNJARO) 5 mg/0.5 mL PnIj, Inject 5 mg into the skin every 7 days., Disp: 6 mL, Rfl: 3    pregabalin (LYRICA) 25 MG capsule, Take 1 capsule (25 mg total) by mouth every evening., Disp: 30 capsule, Rfl: 0    Current Facility-Administered Medications:     triamcinolone acetonide injection 40 mg, 40 mg, Intramuscular, 1 time in Clinic/HOD,

## 2025-08-06 NOTE — PATIENT INSTRUCTIONS
Counseling and Referral of Other Preventative  (Italic type indicates deductible and co-insurance are waived)    Patient Name: Darrian Velez  Today's Date: 8/6/2025    Health Maintenance       Date Due Completion Date    RSV Vaccine (Age 60+ and Pregnant patients) (1 - Risk 60-74 years 1-dose series) Never done ---    Influenza Vaccine (1) 09/01/2025 9/30/2024    PROSTATE-SPECIFIC ANTIGEN 06/02/2026 6/2/2025    Hemoglobin A1c (Prediabetes) 06/02/2026 6/2/2025    TETANUS VACCINE 08/08/2026 8/8/2016    Lipid Panel 06/02/2030 6/2/2025    Colorectal Cancer Screening 07/29/2030 7/29/2025        No orders of the defined types were placed in this encounter.      The following information is provided to all patients.  This information is to help you find resources for any of the problems found today that may be affecting your health:                  Living healthy guide: www.[facility].[place].[facility]      Understanding Diabetes: www.diabetes.org      Eating healthy: www.cdc.gov/healthyweight      CDC home safety checklist: www.cdc.gov/steadi/patient.html      Agency on Aging: www.goea.[place].[facility]      Alcoholics anonymous (AA): www.aa.org      Physical Activity: www.rafael.nih.gov/bw8rwrx      Tobacco use: www.quitwithusla.org

## 2025-08-07 ENCOUNTER — TELEPHONE (OUTPATIENT)
Dept: GASTROENTEROLOGY | Facility: HOSPITAL | Age: 70
End: 2025-08-07
Payer: MEDICARE

## 2025-08-07 NOTE — TELEPHONE ENCOUNTER
Called patient to discuss pathology results from colonoscopy. 2 x TA, 2 x unremarkable colonic mucosa.  Repeat colonoscopy in 5 years.  Patient agreeable to plan.

## 2025-08-11 PROBLEM — Z12.11 SCREEN FOR COLON CANCER: Status: RESOLVED | Noted: 2025-08-06 | Resolved: 2025-08-11

## 2025-08-18 PROBLEM — R35.0 BENIGN PROSTATIC HYPERPLASIA WITH URINARY FREQUENCY: Status: RESOLVED | Noted: 2025-07-01 | Resolved: 2025-08-18

## 2025-08-18 PROBLEM — N40.1 BENIGN PROSTATIC HYPERPLASIA WITH URINARY FREQUENCY: Status: RESOLVED | Noted: 2025-07-01 | Resolved: 2025-08-18

## (undated) DEVICE — Device

## (undated) DEVICE — CATH SUCTION 10FR

## (undated) DEVICE — DRILL QUICK RELEASE 2.8MM 5IN

## (undated) DEVICE — BNDG COFLEX FOAM LF2 ST 4X5YD

## (undated) DEVICE — DRAPE U SPLIT SHEET 54X76IN

## (undated) DEVICE — BANDAGE ELAS SOFTWRAP ST 4X5YD

## (undated) DEVICE — DRAPE TOP 53X102IN

## (undated) DEVICE — SPONGE COTTON TRAY 4X4IN

## (undated) DEVICE — HOSE DUAL W/CPC CONNECTORS

## (undated) DEVICE — GUIDE PIN 2.0MM X 9 IN
Type: IMPLANTABLE DEVICE | Site: ARM | Status: NON-FUNCTIONAL
Removed: 2023-08-07

## (undated) DEVICE — DRESSING N ADH OIL EMUL 3X8

## (undated) DEVICE — ELECTRODE REM PLYHSV RETURN 9

## (undated) DEVICE — TIP YANKAUERS BULB NO VENT

## (undated) DEVICE — TRAY MINOR ORTHO OMC

## (undated) DEVICE — STOCKINETTE SGL PLY 6X48IN

## (undated) DEVICE — GOWN AERO CHROME W/ TOWEL XL

## (undated) DEVICE — TOWEL OR DISP STRL BLUE 4/PK

## (undated) DEVICE — BLADE SAGITTA 5/BX

## (undated) DEVICE — BLADE SURG CARBON STEEL #10

## (undated) DEVICE — SUT VICRYL PLUS 3-0 SH 18IN

## (undated) DEVICE — K-WIRE SNGL TRCR .045 D 6L N/S
Type: IMPLANTABLE DEVICE | Site: ARM | Status: NON-FUNCTIONAL
Removed: 2023-08-07

## (undated) DEVICE — DRAPE C-ARM ELAS CLIP 42X120IN

## (undated) DEVICE — APPLICATOR CHLORAPREP ORN 26ML

## (undated) DEVICE — DRAPE HAND STERILE

## (undated) DEVICE — BIT DRILL QUICK RELEASE 2.0MM

## (undated) DEVICE — SLING SHOT II LARGE

## (undated) DEVICE — TOURNIQUET SB QC DP 18X4IN

## (undated) DEVICE — TAPE SURG DURAPORE 2 X10YD

## (undated) DEVICE — SPONGE LAP 18X18 PREWASHED

## (undated) DEVICE — PAD CAST SPECIALIST STRL 4

## (undated) DEVICE — DRAPE SURGICAL STERI IRRG PCH

## (undated) DEVICE — SUT ETHILON 3/0 18IN PS-1

## (undated) DEVICE — SUT VICRYL PLUS 0 CT1 18IN

## (undated) DEVICE — SCREW HEXALOBE 3.5 X 22MM
Type: IMPLANTABLE DEVICE | Site: ARM | Status: NON-FUNCTIONAL
Removed: 2023-08-07

## (undated) DEVICE — BANDAGE ESMARK ELASTIC ST 4X9

## (undated) DEVICE — BIT DRILL QUICK RELEASE 2.8MM

## (undated) DEVICE — DRAPE STERI U-SHAPED 47X51IN

## (undated) DEVICE — BIT DRILL CALIB QUIK REL 2.3